# Patient Record
Sex: FEMALE | Race: WHITE | NOT HISPANIC OR LATINO | Employment: FULL TIME | URBAN - METROPOLITAN AREA
[De-identification: names, ages, dates, MRNs, and addresses within clinical notes are randomized per-mention and may not be internally consistent; named-entity substitution may affect disease eponyms.]

---

## 2017-03-06 ENCOUNTER — HOSPITAL ENCOUNTER (OUTPATIENT)
Dept: LAB | Facility: MEDICAL CENTER | Age: 57
End: 2017-03-06
Attending: NURSE PRACTITIONER
Payer: COMMERCIAL

## 2017-03-06 LAB
ALBUMIN SERPL BCP-MCNC: 4 G/DL (ref 3.2–4.9)
ALBUMIN/GLOB SERPL: 1.1 G/DL
ALP SERPL-CCNC: 60 U/L (ref 30–99)
ALT SERPL-CCNC: 17 U/L (ref 2–50)
ANION GAP SERPL CALC-SCNC: 9 MMOL/L (ref 0–11.9)
AST SERPL-CCNC: 16 U/L (ref 12–45)
BASOPHILS # BLD AUTO: 0.07 K/UL (ref 0–0.12)
BASOPHILS NFR BLD AUTO: 0.6 % (ref 0–1.8)
BILIRUB SERPL-MCNC: 0.4 MG/DL (ref 0.1–1.5)
BUN SERPL-MCNC: 13 MG/DL (ref 8–22)
CALCIUM SERPL-MCNC: 10.1 MG/DL (ref 8.5–10.5)
CHLORIDE SERPL-SCNC: 107 MMOL/L (ref 96–112)
CHOLEST SERPL-MCNC: 171 MG/DL (ref 100–199)
CO2 SERPL-SCNC: 24 MMOL/L (ref 20–33)
CREAT SERPL-MCNC: 0.66 MG/DL (ref 0.5–1.4)
CREAT UR-MCNC: 462 MG/DL
CRP SERPL HS-MCNC: 1.7 MG/L (ref 0–7.5)
EOSINOPHIL # BLD: 0.06 K/UL (ref 0–0.51)
EOSINOPHIL NFR BLD AUTO: 0.5 % (ref 0–6.9)
ERYTHROCYTE [DISTWIDTH] IN BLOOD BY AUTOMATED COUNT: 47.2 FL (ref 35.9–50)
GLOBULIN SER CALC-MCNC: 3.5 G/DL (ref 1.9–3.5)
GLUCOSE SERPL-MCNC: 127 MG/DL (ref 65–99)
HBV SURFACE AG SERPL QL IA: NEGATIVE
HCT VFR BLD AUTO: 45.8 % (ref 37–47)
HCV AB S/CO SERPL IA: NEGATIVE
HDLC SERPL-MCNC: 50 MG/DL
HGB BLD-MCNC: 15 G/DL (ref 12–16)
HIV 1+2 AB+HIV1 P24 AG SERPL QL IA: NON REACTIVE
IMM GRANULOCYTES # BLD AUTO: 0.07 K/UL (ref 0–0.11)
IMM GRANULOCYTES NFR BLD AUTO: 0.6 % (ref 0–0.9)
LDLC SERPL CALC-MCNC: 91 MG/DL
LYMPHOCYTES # BLD: 1.55 K/UL (ref 1–4.8)
LYMPHOCYTES NFR BLD AUTO: 13 % (ref 22–41)
MCH RBC QN AUTO: 31.7 PG (ref 27–33)
MCHC RBC AUTO-ENTMCNC: 32.8 G/DL (ref 33.6–35)
MCV RBC AUTO: 96.8 FL (ref 81.4–97.8)
MICROALBUMIN UR-MCNC: 4.7 MG/DL
MICROALBUMIN/CREAT UR: 10 MG/G (ref 0–30)
MONOCYTES # BLD: 0.83 K/UL (ref 0–0.85)
MONOCYTES NFR BLD AUTO: 6.9 % (ref 0–13.4)
NEUTROPHILS # BLD: 9.37 K/UL (ref 2–7.15)
NEUTROPHILS NFR BLD AUTO: 78.4 % (ref 44–72)
NRBC # BLD AUTO: 0 K/UL
NRBC BLD-RTO: 0 /100 WBC
PLATELET # BLD AUTO: 303 K/UL (ref 164–446)
PMV BLD AUTO: 9.7 FL (ref 9–12.9)
POTASSIUM SERPL-SCNC: 3.6 MMOL/L (ref 3.6–5.5)
PROT SERPL-MCNC: 7.5 G/DL (ref 6–8.2)
RBC # BLD AUTO: 4.73 M/UL (ref 4.2–5.4)
SODIUM SERPL-SCNC: 140 MMOL/L (ref 135–145)
T3FREE SERPL-MCNC: 3.19 PG/ML (ref 2.4–4.2)
T4 FREE SERPL-MCNC: 0.79 NG/DL (ref 0.53–1.43)
TREPONEMA PALLIDUM IGG+IGM AB [PRESENCE] IN SERUM OR PLASMA BY IMMUNOASSAY: NON REACTIVE
TRIGL SERPL-MCNC: 151 MG/DL (ref 0–149)
TSH SERPL DL<=0.005 MIU/L-ACNC: 0.7 UIU/ML (ref 0.3–3.7)
WBC # BLD AUTO: 12 K/UL (ref 4.8–10.8)

## 2017-03-06 PROCEDURE — 87340 HEPATITIS B SURFACE AG IA: CPT

## 2017-03-06 PROCEDURE — 84443 ASSAY THYROID STIM HORMONE: CPT

## 2017-03-06 PROCEDURE — 80053 COMPREHEN METABOLIC PANEL: CPT

## 2017-03-06 PROCEDURE — 86780 TREPONEMA PALLIDUM: CPT

## 2017-03-06 PROCEDURE — 84481 FREE ASSAY (FT-3): CPT

## 2017-03-06 PROCEDURE — 82043 UR ALBUMIN QUANTITATIVE: CPT

## 2017-03-06 PROCEDURE — 36415 COLL VENOUS BLD VENIPUNCTURE: CPT

## 2017-03-06 PROCEDURE — 80061 LIPID PANEL: CPT

## 2017-03-06 PROCEDURE — 87389 HIV-1 AG W/HIV-1&-2 AB AG IA: CPT

## 2017-03-06 PROCEDURE — 84439 ASSAY OF FREE THYROXINE: CPT

## 2017-03-06 PROCEDURE — 86800 THYROGLOBULIN ANTIBODY: CPT

## 2017-03-06 PROCEDURE — 86803 HEPATITIS C AB TEST: CPT

## 2017-03-06 PROCEDURE — 86141 C-REACTIVE PROTEIN HS: CPT

## 2017-03-06 PROCEDURE — 85025 COMPLETE CBC W/AUTO DIFF WBC: CPT

## 2017-03-06 PROCEDURE — 82570 ASSAY OF URINE CREATININE: CPT

## 2017-03-08 ENCOUNTER — HOSPITAL ENCOUNTER (OUTPATIENT)
Dept: RADIOLOGY | Facility: MEDICAL CENTER | Age: 57
End: 2017-03-08
Attending: NURSE PRACTITIONER
Payer: COMMERCIAL

## 2017-03-08 DIAGNOSIS — R52 PAIN: ICD-10-CM

## 2017-03-08 LAB — THYROGLOB AB SERPL-ACNC: <0.9 IU/ML (ref 0–4)

## 2017-03-08 PROCEDURE — 73080 X-RAY EXAM OF ELBOW: CPT | Mod: RT

## 2017-11-13 ENCOUNTER — TELEPHONE (OUTPATIENT)
Dept: MEDICAL GROUP | Facility: MEDICAL CENTER | Age: 57
End: 2017-11-13

## 2017-11-13 NOTE — TELEPHONE ENCOUNTER
NEW PATIENT VISIT PRE-VISIT PLANNING    1.  [employer] Patient is checked in Patient Demographics? YES    2.  Immunizations were updated in [facility] using WebIZ?: Yes       •  Web Iz Recommendations: FLU, MMR  and TDAP    3.  Is this appointment scheduled as a Hospital Follow-Up? No    4.  Patient is due for the following Health Maintenance Topics:   Health Maintenance Due   Topic Date Due   • IMM DTaP/Tdap/Td Vaccine (1 - Tdap) 06/27/1981   • PAP SMEAR  06/27/1983   • COLONOSCOPY  06/27/2012   • MAMMOGRAM  05/08/2016   • IMM INFLUENZA (1) 09/01/2017       I was unable to contact pt prior to appointment.

## 2017-11-14 ENCOUNTER — OFFICE VISIT (OUTPATIENT)
Dept: MEDICAL GROUP | Facility: MEDICAL CENTER | Age: 57
End: 2017-11-14
Payer: COMMERCIAL

## 2017-11-14 VITALS
RESPIRATION RATE: 20 BRPM | WEIGHT: 221.34 LBS | TEMPERATURE: 99.4 F | BODY MASS INDEX: 35.57 KG/M2 | DIASTOLIC BLOOD PRESSURE: 80 MMHG | OXYGEN SATURATION: 94 % | SYSTOLIC BLOOD PRESSURE: 118 MMHG | HEART RATE: 72 BPM | HEIGHT: 66 IN

## 2017-11-14 DIAGNOSIS — M54.50 CHRONIC MIDLINE LOW BACK PAIN WITHOUT SCIATICA: ICD-10-CM

## 2017-11-14 DIAGNOSIS — G89.29 CHRONIC RIGHT SHOULDER PAIN: ICD-10-CM

## 2017-11-14 DIAGNOSIS — I10 ESSENTIAL HYPERTENSION: ICD-10-CM

## 2017-11-14 DIAGNOSIS — G89.29 CHRONIC MIDLINE LOW BACK PAIN WITHOUT SCIATICA: ICD-10-CM

## 2017-11-14 DIAGNOSIS — M25.511 CHRONIC RIGHT SHOULDER PAIN: ICD-10-CM

## 2017-11-14 DIAGNOSIS — Z12.31 ENCOUNTER FOR SCREENING MAMMOGRAM FOR BREAST CANCER: ICD-10-CM

## 2017-11-14 DIAGNOSIS — R73.03 PREDIABETES: ICD-10-CM

## 2017-11-14 DIAGNOSIS — Z23 NEED FOR VACCINATION: ICD-10-CM

## 2017-11-14 DIAGNOSIS — E66.9 OBESITY (BMI 35.0-39.9 WITHOUT COMORBIDITY): ICD-10-CM

## 2017-11-14 PROCEDURE — 99204 OFFICE O/P NEW MOD 45 MIN: CPT | Mod: 25 | Performed by: INTERNAL MEDICINE

## 2017-11-14 PROCEDURE — 90686 IIV4 VACC NO PRSV 0.5 ML IM: CPT | Performed by: INTERNAL MEDICINE

## 2017-11-14 PROCEDURE — 90471 IMMUNIZATION ADMIN: CPT | Performed by: INTERNAL MEDICINE

## 2017-11-14 RX ORDER — AMLODIPINE BESYLATE 5 MG/1
5 TABLET ORAL DAILY
COMMUNITY
End: 2019-11-11

## 2017-11-14 RX ORDER — TRAMADOL HYDROCHLORIDE 50 MG/1
50 TABLET ORAL EVERY 4 HOURS PRN
COMMUNITY
End: 2019-09-19

## 2017-11-14 ASSESSMENT — PATIENT HEALTH QUESTIONNAIRE - PHQ9: CLINICAL INTERPRETATION OF PHQ2 SCORE: 0

## 2017-11-14 NOTE — PROGRESS NOTES
Subjective:   Isabella Ball is a 57 y.o. female here today to establish care and          Chief complaint: Establish with new provider and right shoulder pain. Patient was followed by a physician in the community. She has not been seen since February 2017. She is due for flu shot and mammogram.     1. Chronic right shoulder pain  Patient is complaining of right shoulder pain for the last 6-8 months. She was treated with physical therapy for right elbow pain however the pain seems to have traveled up to her right shoulder. 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      2. Chronic midline low back pain without sciatica    Patient has mild chronic low back pain. In general it bothers her the most when she gets up in the morning. She has tramadol which she takes infrequently for it. 3. Prediabetes    Results for ISABELLA BALL (MRN [id number]) as of 11/14/2017 10:00   Ref. Range 12/1/2015 08:40 3/6/2017 09:19   Glucose Latest Ref Range: 65 - 99 mg/dL 110 (H) 127 (H)       4. Essential hypertension    Patient's blood pressure has been well controlled on amlodipine 5 mg daily.    5. Need for vaccination    Patient is requesting flu vaccine today.    6. Encounter for screening mammogram for breast cancer    Last mammogram March 2015    7. Obesity (BMI 35.0-39.9 without comorbidity)    Patient has successfully lost about 40 pounds, however she has regained about 15 of that back. She is not currently doing any aerobic exercise.      Current medicines (including changes today)  Current Outpatient Prescriptions   Medication Sig Dispense Refill   • amlodipine (NORVASC) 5 MG Tab Take 5 mg by mouth every day.     • tramadol (ULTRAM) 50 MG Tab Take 50 mg by mouth every four hours as needed.       No current facility-administered medications for this visit.      She  has no past medical history on file.    Social History     Social  "History   • Marital status: Unknown     Spouse name: N/A   • Number of children: N/A   • Years of education: N/A     Social History Main Topics   • Smoking status: Current Every Day Smoker     Packs/day: 0.50     Years: 40.00   • Smokeless tobacco: Never Used   • Alcohol use 1.8 oz/week     3 Shots of liquor per week   • Drug use: Unknown   • Sexual activity: No     Other Topics Concern   • Caffeine Concern No   • Sleep Concern Yes   • Weight Concern Yes   • Special Diet No   • Exercise No     Social History Narrative   • No narrative on file     Family History   Problem Relation Age of Onset   • Cancer Maternal Grandmother      breast   • Cancer Maternal Aunt      breast   • Diabetes Mother    • Hypertension Father    • Heart Disease Paternal Grandmother        ROS       - Constitutional: Negative for fever, chills, unexpected weight change, and fatigue/generalized weakness.     - HEENT: Negative for headaches, vision changes      - Respiratory: Negative for cough, Shortness of breath    - Cardiovascular: Negative for chest pain and bilateral lower extremity edema.     - Gastrointestinal: Negative for heartburn,  abdominal pain,  diarrhea, constipation     - Genitourinary: Negative for dysuria  and urinary incontinence.    - Musculoskeletal: Positive for back and right shoulder pain    - Skin: Negative for rash     - Neurological: Negative for dizziness,  tremors, focal weakness     - Psychiatric/Behavioral: Negative for depression and memory loss.             Objective:     Blood pressure 118/80, pulse 72, temperature 37.4 °C (99.4 °F), resp. rate 20, height 1.676 m (5' 6\"), weight 100.4 kg (221 lb 5.5 oz), SpO2 94 %, not currently breastfeeding. Body mass index is 35.73 kg/m².     Physical Exam:  Constitutional: Alert, no distress.  Skin: Warm, dry, good turgor, no rashes in visible areas.  Eye: Equal, round and reactive, conjunctiva clear, lids normal.  ENMT: Lips without lesions, good dentition, oropharynx " clear. Hearing grossly intact.  Neck: No masses, no thyromegaly. No cervical or supraclavicular lymphadenopathy  Respiratory: Unlabored respiratory effort, lungs clear to auscultation, no wheezes, no rhonchi.  Cardiovascular: Regular rate and rhythm, without murmur, no edema.  Abdomen: Soft, non-tender, no masses, no hepatosplenomegaly.  Extremities: Mild tenderness around the rotator cuff of the right shoulder. Limited AB duction, unable to raise arm all the way over head.  Psych: Alert and oriented x3, normal affect and mood. Insight and judgment good            Assessment and Plan:   The following treatment plan was discussed    1. Chronic right shoulder pain      - REFERRAL TO PHYSICAL THERAPY Reason for Therapy: Eval/Treat/Report    2. Chronic midline low back pain without sciatica    Discussed getting a new mattress, hers is very old.    3. Prediabetes      - COMP METABOLIC PANEL; Future  - CBC WITH DIFFERENTIAL; Future  - HEMOGLOBIN A1C; Future    4. Essential hypertension    Well-controlled, continue amlodipine    5. Need for vaccination      - INFLUENZA VACCINE QUAD INJ >3Y(PF)    6. Encounter for screening mammogram for breast cancer      - MA-MAMMO SCREENING BILAT W/LINN W/CAD; Future    7. Obesity (BMI 35.0-39.9 without comorbidity)     TSH was normal March 2017  - Patient identified as having weight management issue.  Appropriate orders and counseling given.      Followup: Patient will follow up annually sooner when necessary

## 2017-11-14 NOTE — PROGRESS NOTES
Chief Complaint   Patient presents with   • New Patient   • Shoulder Pain     R,radiates down to arm   • Back Pain     Lower       HISTORY OF PRESENT ILLNESS: Patient is a 57 y.o. female patient who presents today to discuss the evaluation and management of:          1. Chronic right shoulder pain  ***    2. Chronic midline low back pain without sciatica  ***    3. Prediabetes  ***    4. Essential hypertension  ***    5. Need for vaccination  ***    6. Encounter for screening mammogram for breast cancer  ***    7. Obesity (BMI 35.0-39.9 without comorbidity)  ***        Patient Active Problem List    Diagnosis Date Noted   • Chronic right shoulder pain 11/14/2017   • Chronic midline low back pain without sciatica 11/14/2017   • Prediabetes 11/14/2017   • Essential hypertension 11/14/2017   • Obesity (BMI 35.0-39.9 without comorbidity) ([facility]) 11/14/2017        Allergies:Codeine    Current meds including changes today  Current Outpatient Prescriptions   Medication Sig Dispense Refill   • amlodipine (NORVASC) 5 MG Tab Take 5 mg by mouth every day.     • tramadol (ULTRAM) 50 MG Tab Take 50 mg by mouth every four hours as needed.       No current facility-administered medications for this visit.      Social History   Substance Use Topics   • Smoking status: Current Every Day Smoker     Packs/day: 0.50     Years: 40.00   • Smokeless tobacco: Never Used   • Alcohol use 1.8 oz/week     3 Shots of liquor per week     Social History     Social History Narrative   • No narrative on file       Family History   Problem Relation Age of Onset   • Cancer Maternal Grandmother      breast   • Cancer Maternal Aunt      breast   • Diabetes Mother    • Hypertension Father    • Heart Disease Paternal Grandmother        Review of Systems:  No chest pain, No shortness of breath, No dyspnea on exertion  Gastrointestinal ROS: No abdominal pain, No nausea, vomiting, diarrhea, or constipation  ***    Exam:  ***  Blood pressure 118/80, pulse 72,  "temperature 37.4 °C (99.4 °F), resp. rate 20, height 1.676 m (5' 6\"), weight 100.4 kg (221 lb 5.5 oz), SpO2 94 %, not currently breastfeeding.  General:  Well nourished, well developed female in NAD affect and mood within normal limits  Head is grossly normal.  Neck: Supple without adenopathy  Pulmonary: Clear to ausculation.  Normal effort. No rales, rhonchi, or wheezing.  Cardiovascular: Regular rate and rhythm without murmur.   Extremities: no clubbing, cyanosis, or edema.  Neuro: moves all extremities symmetrically    Please note that this dictation was created using voice recognition software. I have made every reasonable attempt to correct obvious errors, but I expect that there are errors of grammar and possibly content that I did not discover before finalizing the note.    Assessment/Plan:  1. Chronic right shoulder pain  ***  - REFERRAL TO PHYSICAL THERAPY Reason for Therapy: Eval/Treat/Report    2. Chronic midline low back pain without sciatica  ***    3. Prediabetes  ***  - COMP METABOLIC PANEL; Future  - CBC WITH DIFFERENTIAL; Future  - HEMOGLOBIN A1C; Future    4. Essential hypertension  ***    5. Need for vaccination  ***  - INFLUENZA VACCINE QUAD INJ >3Y(PF)    6. Encounter for screening mammogram for breast cancer  ***  - MA-MAMMO SCREENING BILAT W/LINN W/CAD; Future    7. Obesity (BMI 35.0-39.9 without comorbidity)  ***  - Patient identified as having weight management issue.  Appropriate orders and counseling given.    Followup: No Follow-up on file.        "

## 2019-09-19 ENCOUNTER — OFFICE VISIT (OUTPATIENT)
Dept: MEDICAL GROUP | Facility: MEDICAL CENTER | Age: 59
End: 2019-09-19
Payer: COMMERCIAL

## 2019-09-19 VITALS
TEMPERATURE: 98.2 F | SYSTOLIC BLOOD PRESSURE: 142 MMHG | HEART RATE: 65 BPM | BODY MASS INDEX: 40.72 KG/M2 | HEIGHT: 66 IN | DIASTOLIC BLOOD PRESSURE: 96 MMHG | RESPIRATION RATE: 14 BRPM | WEIGHT: 253.4 LBS | OXYGEN SATURATION: 91 %

## 2019-09-19 DIAGNOSIS — M79.671 BILATERAL FOOT PAIN: ICD-10-CM

## 2019-09-19 DIAGNOSIS — R73.03 PREDIABETES: ICD-10-CM

## 2019-09-19 DIAGNOSIS — Z12.39 BREAST CANCER SCREENING: ICD-10-CM

## 2019-09-19 DIAGNOSIS — Z13.6 SCREENING FOR CARDIOVASCULAR CONDITION: ICD-10-CM

## 2019-09-19 DIAGNOSIS — Z12.11 SCREENING FOR COLORECTAL CANCER: ICD-10-CM

## 2019-09-19 DIAGNOSIS — J06.9 VIRAL UPPER RESPIRATORY TRACT INFECTION: ICD-10-CM

## 2019-09-19 DIAGNOSIS — M79.672 BILATERAL FOOT PAIN: ICD-10-CM

## 2019-09-19 DIAGNOSIS — E66.01 MORBID OBESITY (HCC): ICD-10-CM

## 2019-09-19 DIAGNOSIS — Z72.0 TOBACCO ABUSE: ICD-10-CM

## 2019-09-19 DIAGNOSIS — Z12.12 SCREENING FOR COLORECTAL CANCER: ICD-10-CM

## 2019-09-19 DIAGNOSIS — I10 ESSENTIAL HYPERTENSION: ICD-10-CM

## 2019-09-19 PROBLEM — M25.511 CHRONIC RIGHT SHOULDER PAIN: Status: RESOLVED | Noted: 2017-11-14 | Resolved: 2019-09-19

## 2019-09-19 PROBLEM — M54.50 CHRONIC MIDLINE LOW BACK PAIN WITHOUT SCIATICA: Status: RESOLVED | Noted: 2017-11-14 | Resolved: 2019-09-19

## 2019-09-19 PROBLEM — G89.29 CHRONIC RIGHT SHOULDER PAIN: Status: RESOLVED | Noted: 2017-11-14 | Resolved: 2019-09-19

## 2019-09-19 PROBLEM — G89.29 CHRONIC MIDLINE LOW BACK PAIN WITHOUT SCIATICA: Status: RESOLVED | Noted: 2017-11-14 | Resolved: 2019-09-19

## 2019-09-19 LAB
HBA1C MFR BLD: 5.8 % (ref 0–5.6)
INT CON NEG: NEGATIVE
INT CON POS: POSITIVE

## 2019-09-19 PROCEDURE — 99214 OFFICE O/P EST MOD 30 MIN: CPT | Performed by: PHYSICIAN ASSISTANT

## 2019-09-19 PROCEDURE — 83036 HEMOGLOBIN GLYCOSYLATED A1C: CPT | Performed by: PHYSICIAN ASSISTANT

## 2019-09-19 RX ORDER — AZITHROMYCIN 250 MG/1
TABLET, FILM COATED ORAL
Qty: 6 TAB | Refills: 0 | Status: SHIPPED | OUTPATIENT
Start: 2019-09-19 | End: 2019-09-24

## 2019-09-19 RX ORDER — AZITHROMYCIN 250 MG/1
TABLET, FILM COATED ORAL
Qty: 6 TAB | Refills: 0 | Status: SHIPPED | OUTPATIENT
Start: 2019-09-19 | End: 2019-09-19 | Stop reason: CLARIF

## 2019-09-19 RX ORDER — AMLODIPINE BESYLATE 5 MG/1
5 TABLET ORAL DAILY
Qty: 90 TAB | Refills: 3 | Status: SHIPPED | OUTPATIENT
Start: 2019-09-19 | End: 2020-01-15 | Stop reason: SDUPTHER

## 2019-09-19 SDOH — HEALTH STABILITY: MENTAL HEALTH: HOW OFTEN DO YOU HAVE 6 OR MORE DRINKS ON ONE OCCASION?: WEEKLY

## 2019-09-19 SDOH — HEALTH STABILITY: MENTAL HEALTH: HOW MANY STANDARD DRINKS CONTAINING ALCOHOL DO YOU HAVE ON A TYPICAL DAY?: 1 OR 2

## 2019-09-19 SDOH — HEALTH STABILITY: MENTAL HEALTH: HOW OFTEN DO YOU HAVE A DRINK CONTAINING ALCOHOL?: 2-3 TIMES A WEEK

## 2019-09-19 NOTE — ASSESSMENT & PLAN NOTE
Few months. Gained weight - about 30 pounds over 6-7 months. No other change with work or shoes. Had been wearing heels and stopped that. Bottoms of feet and heels. Worse first thing in the morning and when she walks a lot. Willing to see podiatry.

## 2019-09-19 NOTE — ASSESSMENT & PLAN NOTE
3 weeks. No recurrent sinusitis or bronchitis. No hx of sinusitis or asthma. No otc meds currently. In the beginning taking radha seltzer. Currently with irritating cough. Some SOB and wheezing. No fever. Initially a lot of body aches. That is getting better. Sore throat is getting better. No hx of travel or known sick contacts. Does get some seasonal allergie with weeds and juan f, especially with eyes.

## 2019-09-19 NOTE — ASSESSMENT & PLAN NOTE
Amlodipine 5mg daily for about 4 years. Dr Beckford in [place] was prescribing. Last saw them few years ago. No known heart disease, no previous hx of stroke, blood clots.

## 2019-09-19 NOTE — PROGRESS NOTES
Subjective:   Gladis Ball is a 59 y.o. female here today to establish care. Saw Dr. Solis once in 2017. No other primary care since then. Due for preventative health care. Has a few new concerns.     Bilateral foot pain  Few months. Gained weight - about 30 pounds over 6-7 months. No other change with work or shoes. Had been wearing heels and stopped that. Bottoms of feet and heels. Worse first thing in the morning and when she walks a lot. Willing to see podiatry.     Viral upper respiratory tract infection  3 weeks. No recurrent sinusitis or bronchitis. No hx of sinusitis or asthma. No otc meds currently. In the beginning taking radha seltzer. Currently with irritating cough. Some SOB and wheezing. No fever. Initially a lot of body aches. That is getting better. Sore throat is getting better. No hx of travel or known sick contacts. Does get some seasonal allergie with weeds and juan f, especially with eyes.     Tobacco abuse  About 40 years, 1/2 pack per day, so far no known lung disease. Not interested in quitting. States that she understands the risk associated.    Essential hypertension  Amlodipine 5mg daily for about 4 years. Dr Beckford in [place] was prescribing. Last saw them few years ago. No known heart disease, no previous hx of stroke, blood clots.    Morbid obesity (HCC)  Knows she weighs to much, too much starch - chips, mashed potatoes, candy, etc. Knows stress at work and working too many hours isn't helping. Declines nutritionist or other referral at this time.    Prediabetes  POCT 5.8 today in office       Current medicines (including changes today)  Current Outpatient Medications   Medication Sig Dispense Refill   • amLODIPine (NORVASC) 5 MG Tab Take 1 Tab by mouth every day. 90 Tab 3   • azithromycin (ZITHROMAX) 250 MG Tab Use ABRAN as directed 6 Tab 0   • amlodipine (NORVASC) 5 MG Tab Take 5 mg by mouth every day.       No current facility-administered medications for this visit.      She  has no  "past medical history on file.    ROS   No fever/chills. No headache/dizziness. No focal weakness. No sore throat, nasal congestion, ear pain. No chest pain. No n/v/d/c or abdominal pain. No urinary complaint. No rash or skin lesion. No joint redness or swelling.       Objective:     /96 (BP Location: Right arm, Patient Position: Sitting, BP Cuff Size: Adult long)   Pulse 65   Temp 36.8 °C (98.2 °F) (Temporal)   Resp 14   Ht 1.676 m (5' 6\")   Wt 114.9 kg (253 lb 6.4 oz)   SpO2 91%  Body mass index is 40.9 kg/m².   Physical Exam:  Constitutional: WDWN, NAD  Skin: Warm, dry, good turgor, no rashes in visible areas.  Eye: Equal, round and reactive, conjunctiva clear, lids normal.  ENMT: Lips without lesions, good dentition, oropharynx clear.  Neck: Trachea midline, no masses, no thyromegaly. No cervical or supraclavicular lymphadenopathy  Respiratory: Unlabored respiratory effort, lungs clear to auscultation, no wheezes, no ronchi.  Cardiovascular: Normal S1, S2, no murmur, no leg edema.  Psych: Alert and oriented x3, normal affect and mood.    Assessment and Plan:   The following treatment plan was discussed    1. Essential hypertension  Amlodipine 5mg sent to pharmacy. Check in office in 2-3 weeks.  - Comp Metabolic Panel; Future  - TSH WITH REFLEX TO FT4; Future    2. Bilateral foot pain    - REFERRAL TO PODIATRY    3. Morbid obesity (HCC)      4. Screening for colorectal cancer    - REFERRAL TO GI FOR COLONOSCOPY    5. Breast cancer screening    - MA-SCREEN MAMMO W/CAD-BILAT; Future    6. Tobacco abuse      7. Viral upper respiratory tract infection    - azithromycin (ZITHROMAX) 250 MG Tab; Use ABRAN as directed  Dispense: 6 Tab; Refill: 0    8. Screening for cardiovascular condition    - Lipid Profile; Future    9. Prediabetes    - POCT A1C      Followup: Return in about 2 weeks (around 10/3/2019).         "

## 2019-09-19 NOTE — ASSESSMENT & PLAN NOTE
About 40 years, 1/2 pack per day, so far no known lung disease. Not interested in quitting. States that she understands the risk associated.

## 2019-09-19 NOTE — ASSESSMENT & PLAN NOTE
Knows she weighs to much, too much starch - chips, mashed potatoes, candy, etc. Knows stress at work and working too many hours isn't helping. Declines nutritionist or other referral at this time.

## 2019-11-11 ENCOUNTER — OFFICE VISIT (OUTPATIENT)
Dept: MEDICAL GROUP | Facility: MEDICAL CENTER | Age: 59
End: 2019-11-11
Payer: COMMERCIAL

## 2019-11-11 VITALS
HEIGHT: 66 IN | OXYGEN SATURATION: 94 % | SYSTOLIC BLOOD PRESSURE: 132 MMHG | DIASTOLIC BLOOD PRESSURE: 90 MMHG | HEART RATE: 76 BPM | RESPIRATION RATE: 16 BRPM | TEMPERATURE: 98.4 F | BODY MASS INDEX: 40.92 KG/M2 | WEIGHT: 254.6 LBS

## 2019-11-11 DIAGNOSIS — I10 ESSENTIAL HYPERTENSION: ICD-10-CM

## 2019-11-11 DIAGNOSIS — Z23 NEED FOR VACCINATION: ICD-10-CM

## 2019-11-11 PROBLEM — J06.9 VIRAL UPPER RESPIRATORY TRACT INFECTION: Status: RESOLVED | Noted: 2019-09-19 | Resolved: 2019-11-11

## 2019-11-11 PROCEDURE — 90686 IIV4 VACC NO PRSV 0.5 ML IM: CPT | Performed by: PHYSICIAN ASSISTANT

## 2019-11-11 PROCEDURE — 90471 IMMUNIZATION ADMIN: CPT | Performed by: PHYSICIAN ASSISTANT

## 2019-11-11 PROCEDURE — 99213 OFFICE O/P EST LOW 20 MIN: CPT | Mod: 25 | Performed by: PHYSICIAN ASSISTANT

## 2019-11-11 RX ORDER — AMLODIPINE BESYLATE 2.5 MG/1
2.5 TABLET ORAL DAILY
Qty: 90 TAB | Refills: 3 | Status: SHIPPED | OUTPATIENT
Start: 2019-11-11 | End: 2020-02-25

## 2019-11-11 ASSESSMENT — PATIENT HEALTH QUESTIONNAIRE - PHQ9: CLINICAL INTERPRETATION OF PHQ2 SCORE: 0

## 2019-11-11 NOTE — PROGRESS NOTES
"Subjective:   Gladis Ball is a 59 y.o. female here today for HTN follow up    Essential hypertension  Taking amlodipine 5mg daily without adverse reaction. Doesn't check BPs at home. No headaches or dizziness. Will try to decrease salt in diet. Agrees to go up to 7.5mg amlodipine daily to optimize BP       Current medicines (including changes today)  Current Outpatient Medications   Medication Sig Dispense Refill   • amLODIPine (NORVASC) 2.5 MG Tab Take 1 Tab by mouth every day. 90 Tab 3   • amLODIPine (NORVASC) 5 MG Tab Take 1 Tab by mouth every day. 90 Tab 3     No current facility-administered medications for this visit.      She  has no past medical history on file.    ROS   No fever/chills. No weight change. No headache/dizziness. No focal weakness. No sore throat, nasal congestion, ear pain. No chest pain, no shortness of breath, difficulty breathing. No n/v/d/c or abdominal pain. No urinary complaint. No rash or skin lesion.        Objective:     /90 (BP Location: Right arm, Patient Position: Sitting, BP Cuff Size: Adult long)   Pulse 76   Temp 36.9 °C (98.4 °F) (Temporal)   Resp 16   Ht 1.676 m (5' 6\")   Wt 115.5 kg (254 lb 9.6 oz)   SpO2 94%  Body mass index is 41.09 kg/m².   Physical Exam:  Constitutional: WDWN, NAD  Skin: Warm, dry, good turgor, no rashes in visible areas.  Respiratory: Unlabored respiratory effort, lungs clear to auscultation, no wheezes, no ronchi.  Cardiovascular: Normal S1, S2, no murmur, no leg edema.  Psych: Alert and oriented x3, normal affect and mood.    Assessment and Plan:   The following treatment plan was discussed    1. Essential hypertension  Cont 5mg and add 2.5mg for total of 7.5mg daily  - amLODIPine (NORVASC) 2.5 MG Tab; Take 1 Tab by mouth every day.  Dispense: 90 Tab; Refill: 3    2. Need for vaccination    - Influenza Vaccine Quad Injection (PF)      Followup:3 months         "

## 2019-11-11 NOTE — ASSESSMENT & PLAN NOTE
Taking amlodipine 5mg daily without adverse reaction. Doesn't check BPs at home. No headaches or dizziness. Will try to decrease salt in diet. Agrees to go up to 7.5mg amlodipine daily to optimize BP

## 2019-11-12 ENCOUNTER — HOSPITAL ENCOUNTER (OUTPATIENT)
Dept: RADIOLOGY | Facility: MEDICAL CENTER | Age: 59
End: 2019-11-12
Attending: PHYSICIAN ASSISTANT
Payer: COMMERCIAL

## 2019-11-12 DIAGNOSIS — Z12.39 BREAST CANCER SCREENING: ICD-10-CM

## 2019-11-12 PROCEDURE — 77063 BREAST TOMOSYNTHESIS BI: CPT

## 2019-11-15 ENCOUNTER — HOSPITAL ENCOUNTER (OUTPATIENT)
Dept: RADIOLOGY | Facility: MEDICAL CENTER | Age: 59
End: 2019-11-15
Attending: PHYSICIAN ASSISTANT
Payer: COMMERCIAL

## 2019-11-15 DIAGNOSIS — R92.8 ABNORMAL MAMMOGRAM: ICD-10-CM

## 2019-11-15 PROCEDURE — 77065 DX MAMMO INCL CAD UNI: CPT | Mod: LT

## 2019-11-19 ENCOUNTER — HOSPITAL ENCOUNTER (OUTPATIENT)
Dept: RADIOLOGY | Facility: MEDICAL CENTER | Age: 59
End: 2019-11-19
Attending: PHYSICIAN ASSISTANT
Payer: COMMERCIAL

## 2019-11-19 DIAGNOSIS — R92.8 ABNORMAL FINDING ON BREAST IMAGING: ICD-10-CM

## 2019-11-19 LAB — PATHOLOGY CONSULT NOTE: NORMAL

## 2019-11-19 PROCEDURE — 88305 TISSUE EXAM BY PATHOLOGIST: CPT

## 2019-11-19 PROCEDURE — 19081 BX BREAST 1ST LESION STRTCTC: CPT

## 2019-11-20 ENCOUNTER — TELEPHONE (OUTPATIENT)
Dept: RADIOLOGY | Facility: MEDICAL CENTER | Age: 59
End: 2019-11-20

## 2020-01-15 RX ORDER — AMLODIPINE BESYLATE 5 MG/1
5 TABLET ORAL DAILY
Qty: 90 TAB | Refills: 3 | Status: SHIPPED | OUTPATIENT
Start: 2020-01-15 | End: 2020-02-25

## 2020-01-30 ENCOUNTER — HOSPITAL ENCOUNTER (OUTPATIENT)
Dept: LAB | Facility: MEDICAL CENTER | Age: 60
End: 2020-01-30
Attending: PHYSICIAN ASSISTANT
Payer: COMMERCIAL

## 2020-01-30 DIAGNOSIS — Z13.6 SCREENING FOR CARDIOVASCULAR CONDITION: ICD-10-CM

## 2020-01-30 DIAGNOSIS — I10 ESSENTIAL HYPERTENSION: ICD-10-CM

## 2020-01-30 LAB
ALBUMIN SERPL BCP-MCNC: 4.1 G/DL (ref 3.2–4.9)
ALBUMIN/GLOB SERPL: 1.2 G/DL
ALP SERPL-CCNC: 64 U/L (ref 30–99)
ALT SERPL-CCNC: 27 U/L (ref 2–50)
ANION GAP SERPL CALC-SCNC: 10 MMOL/L (ref 0–11.9)
AST SERPL-CCNC: 23 U/L (ref 12–45)
BILIRUB SERPL-MCNC: 0.4 MG/DL (ref 0.1–1.5)
BUN SERPL-MCNC: 16 MG/DL (ref 8–22)
CALCIUM SERPL-MCNC: 9.7 MG/DL (ref 8.5–10.5)
CHLORIDE SERPL-SCNC: 107 MMOL/L (ref 96–112)
CHOLEST SERPL-MCNC: 176 MG/DL (ref 100–199)
CO2 SERPL-SCNC: 22 MMOL/L (ref 20–33)
CREAT SERPL-MCNC: 0.78 MG/DL (ref 0.5–1.4)
GLOBULIN SER CALC-MCNC: 3.4 G/DL (ref 1.9–3.5)
GLUCOSE SERPL-MCNC: 136 MG/DL (ref 65–99)
HDLC SERPL-MCNC: 39 MG/DL
LDLC SERPL CALC-MCNC: 112 MG/DL
POTASSIUM SERPL-SCNC: 4 MMOL/L (ref 3.6–5.5)
PROT SERPL-MCNC: 7.5 G/DL (ref 6–8.2)
SODIUM SERPL-SCNC: 139 MMOL/L (ref 135–145)
TRIGL SERPL-MCNC: 126 MG/DL (ref 0–149)
TSH SERPL DL<=0.005 MIU/L-ACNC: 0.9 UIU/ML (ref 0.38–5.33)

## 2020-01-30 PROCEDURE — 84443 ASSAY THYROID STIM HORMONE: CPT

## 2020-01-30 PROCEDURE — 80061 LIPID PANEL: CPT

## 2020-01-30 PROCEDURE — 36415 COLL VENOUS BLD VENIPUNCTURE: CPT

## 2020-01-30 PROCEDURE — 80053 COMPREHEN METABOLIC PANEL: CPT

## 2020-02-25 ENCOUNTER — OFFICE VISIT (OUTPATIENT)
Dept: MEDICAL GROUP | Facility: MEDICAL CENTER | Age: 60
End: 2020-02-25
Payer: COMMERCIAL

## 2020-02-25 VITALS
HEIGHT: 67 IN | TEMPERATURE: 98.2 F | SYSTOLIC BLOOD PRESSURE: 132 MMHG | OXYGEN SATURATION: 94 % | HEART RATE: 81 BPM | DIASTOLIC BLOOD PRESSURE: 92 MMHG | WEIGHT: 258.82 LBS | BODY MASS INDEX: 40.62 KG/M2

## 2020-02-25 DIAGNOSIS — M79.671 BILATERAL FOOT PAIN: ICD-10-CM

## 2020-02-25 DIAGNOSIS — E66.01 MORBID OBESITY (HCC): ICD-10-CM

## 2020-02-25 DIAGNOSIS — R53.83 FATIGUE, UNSPECIFIED TYPE: ICD-10-CM

## 2020-02-25 DIAGNOSIS — R73.03 PREDIABETES: ICD-10-CM

## 2020-02-25 DIAGNOSIS — I10 ESSENTIAL HYPERTENSION: ICD-10-CM

## 2020-02-25 DIAGNOSIS — M79.672 BILATERAL FOOT PAIN: ICD-10-CM

## 2020-02-25 DIAGNOSIS — Z23 NEED FOR VACCINATION: ICD-10-CM

## 2020-02-25 DIAGNOSIS — R40.0 DAYTIME SLEEPINESS: ICD-10-CM

## 2020-02-25 LAB
HBA1C MFR BLD: 6.3 % (ref 0–5.6)
INT CON NEG: ABNORMAL
INT CON POS: ABNORMAL

## 2020-02-25 PROCEDURE — 99214 OFFICE O/P EST MOD 30 MIN: CPT | Mod: 25 | Performed by: PHYSICIAN ASSISTANT

## 2020-02-25 PROCEDURE — 90472 IMMUNIZATION ADMIN EACH ADD: CPT | Performed by: PHYSICIAN ASSISTANT

## 2020-02-25 PROCEDURE — 90715 TDAP VACCINE 7 YRS/> IM: CPT | Performed by: PHYSICIAN ASSISTANT

## 2020-02-25 PROCEDURE — 90471 IMMUNIZATION ADMIN: CPT | Performed by: PHYSICIAN ASSISTANT

## 2020-02-25 PROCEDURE — 90732 PPSV23 VACC 2 YRS+ SUBQ/IM: CPT | Performed by: PHYSICIAN ASSISTANT

## 2020-02-25 PROCEDURE — 83036 HEMOGLOBIN GLYCOSYLATED A1C: CPT | Performed by: PHYSICIAN ASSISTANT

## 2020-02-25 RX ORDER — AMLODIPINE BESYLATE 10 MG/1
10 TABLET ORAL DAILY
Qty: 90 TAB | Refills: 3 | Status: SHIPPED | OUTPATIENT
Start: 2020-02-25 | End: 2020-12-28

## 2020-02-25 ASSESSMENT — PATIENT HEALTH QUESTIONNAIRE - PHQ9: CLINICAL INTERPRETATION OF PHQ2 SCORE: 0

## 2020-02-25 NOTE — PROGRESS NOTES
"Subjective:   Gladis Ball is a 59 y.o. female here today for follow up on BP, lab review    Essential hypertension  Taking the amlodipine 7.5mg as directed. Willing to increase to 10mg. No known adverse reaction    Bilateral foot pain  Saw podiatrist, was told she would need surgical correction of both feet, had to postpone d/t surgery for her dad, will try again in the late spring or summer    Prediabetes  HgA1C 6.0 today up from 5.8 in Sept 2019. Discussed diet changes. Repeat 3 months    Daytime sleepiness  Wonders if she has sleep apnea, hasn't been tested before       Current medicines (including changes today)  Current Outpatient Medications   Medication Sig Dispense Refill   • amLODIPine (NORVASC) 10 MG Tab Take 1 Tab by mouth every day. 90 Tab 3     No current facility-administered medications for this visit.      She  has no past medical history on file.    ROS   No fever/chills. No weight change. No headache/dizziness. No focal weakness. No sore throat, nasal congestion, ear pain. No chest pain, no shortness of breath, difficulty breathing. No n/v/d/c or abdominal pain. No urinary complaint. No rash or skin lesion.      Objective:     /92 (BP Location: Left arm, Patient Position: Sitting, BP Cuff Size: Adult long)   Pulse 81   Temp 36.8 °C (98.2 °F)   Ht 1.702 m (5' 7\")   Wt 117.4 kg (258 lb 13.1 oz)   SpO2 94%  Body mass index is 40.54 kg/m².   Physical Exam:  Constitutional: WDWN, NAD  Skin: Warm, dry, good turgor, no rashes in visible areas.  Respiratory: Unlabored respiratory effort, lungs clear to auscultation, no wheezes, no ronchi.  Cardiovascular: Normal S1, S2, no murmur, no leg edema.  Psych: Alert and oriented x3, normal affect and mood.    Assessment and Plan:   The following treatment plan was discussed    1. Prediabetes    - POCT A1C    2. Need for vaccination    - Pneumovax Vaccine (PPSV23)  - Tdap Vaccine =>8YO IM    3. Essential hypertension      4. Bilateral foot " pain      5. Daytime sleepiness    - REFERRAL TO SLEEP STUDIES    6. Fatigue, unspecified type    - REFERRAL TO SLEEP STUDIES  - CBC WITH DIFFERENTIAL; Future    7. Morbid obesity (HCC)    - REFERRAL TO SLEEP STUDIES      Followup: Return in about 3 months (around 5/25/2020).

## 2020-02-25 NOTE — ASSESSMENT & PLAN NOTE
Saw podiatrist, was told she would need surgical correction of both feet, had to postpone d/t surgery for her dad, will try again in the late spring or summer

## 2020-03-06 ENCOUNTER — SUPERVISING PHYSICIAN REVIEW (OUTPATIENT)
Dept: MEDICAL GROUP | Facility: MEDICAL CENTER | Age: 60
End: 2020-03-06

## 2020-09-28 ENCOUNTER — OFFICE VISIT (OUTPATIENT)
Dept: MEDICAL GROUP | Facility: MEDICAL CENTER | Age: 60
End: 2020-09-28
Payer: COMMERCIAL

## 2020-09-28 VITALS
TEMPERATURE: 96.8 F | BODY MASS INDEX: 40.53 KG/M2 | WEIGHT: 258.2 LBS | HEIGHT: 67 IN | SYSTOLIC BLOOD PRESSURE: 124 MMHG | OXYGEN SATURATION: 94 % | HEART RATE: 95 BPM | DIASTOLIC BLOOD PRESSURE: 82 MMHG

## 2020-09-28 DIAGNOSIS — M25.472 BILATERAL SWELLING OF FEET AND ANKLES: ICD-10-CM

## 2020-09-28 DIAGNOSIS — E66.01 MORBID OBESITY (HCC): ICD-10-CM

## 2020-09-28 DIAGNOSIS — M25.475 BILATERAL SWELLING OF FEET AND ANKLES: ICD-10-CM

## 2020-09-28 DIAGNOSIS — M25.474 BILATERAL SWELLING OF FEET AND ANKLES: ICD-10-CM

## 2020-09-28 DIAGNOSIS — M25.471 BILATERAL SWELLING OF FEET AND ANKLES: ICD-10-CM

## 2020-09-28 DIAGNOSIS — R73.03 PREDIABETES: ICD-10-CM

## 2020-09-28 DIAGNOSIS — I10 ESSENTIAL HYPERTENSION: ICD-10-CM

## 2020-09-28 DIAGNOSIS — R23.3 PETECHIAE: ICD-10-CM

## 2020-09-28 LAB
HBA1C MFR BLD: 6.2 % (ref 0–5.6)
INT CON NEG: ABNORMAL
INT CON POS: ABNORMAL

## 2020-09-28 PROCEDURE — 83036 HEMOGLOBIN GLYCOSYLATED A1C: CPT | Performed by: PHYSICIAN ASSISTANT

## 2020-09-28 PROCEDURE — 99214 OFFICE O/P EST MOD 30 MIN: CPT | Performed by: PHYSICIAN ASSISTANT

## 2020-09-28 RX ORDER — HYDROCHLOROTHIAZIDE 25 MG/1
25 TABLET ORAL DAILY
Qty: 90 TAB | Refills: 3 | Status: SHIPPED | OUTPATIENT
Start: 2020-09-28 | End: 2022-03-15

## 2020-09-28 NOTE — ASSESSMENT & PLAN NOTE
About 1-2 months. New problem. Swelling of feet and ankles. No known cause. Worse after standing. Worse after working all day. Better in the am when she wakes up. Better when she takes one of her mom's furosemide pills. No redness of legs. No color change of feet. No SOB or difficulty breathing. No change to amlodipine. No known h/o heart failure. No known h/o vascular disease.

## 2020-09-28 NOTE — ASSESSMENT & PLAN NOTE
Small red/brown spots/freckles on bilat upper feet, few months, unknown cause. No pain or itching. No bruising or easy bleeding. No history of similar

## 2020-09-28 NOTE — PROGRESS NOTES
"Subjective:   Gladis Ball is a 60 y.o. female here today for swelling of feet and ankles and f/u on chronic conditions    Morbid obesity (HCC)  Weight stable, no change in diet or exercise    Essential hypertension  Chronic, stable, no headache or dizziness, having some lower leg/ankle/feet swelling    Prediabetes  6.2 today. Discussed diet and exercise    Petechiae  Small red/brown spots/freckles on bilat upper feet, few months, unknown cause. No pain or itching. No bruising or easy bleeding. No history of similar    Bilateral swelling of feet and ankles  About 1-2 months. New problem. Swelling of feet and ankles. No known cause. Worse after standing. Worse after working all day. Better in the am when she wakes up. Better when she takes one of her mom's furosemide pills. No redness of legs. No color change of feet. No SOB or difficulty breathing. No change to amlodipine. No known h/o heart failure. No known h/o vascular disease.        Current medicines (including changes today)  Current Outpatient Medications   Medication Sig Dispense Refill   • hydroCHLOROthiazide (HYDRODIURIL) 25 MG Tab Take 1 Tab by mouth every day. 90 Tab 3   • amLODIPine (NORVASC) 10 MG Tab Take 1 Tab by mouth every day. 90 Tab 3     No current facility-administered medications for this visit.      She  has no past medical history on file.    ROS   No fever/chills. No weight change. No headache/dizziness. No focal weakness. No sore throat, nasal congestion, ear pain. No chest pain, no shortness of breath, difficulty breathing. No n/v/d/c or abdominal pain. No urinary complaint.No joint pain or swelling.       Objective:     /82 (BP Location: Right arm, Patient Position: Sitting, BP Cuff Size: Adult long)   Pulse 95   Temp 36 °C (96.8 °F) (Temporal)   Ht 1.702 m (5' 7\")   Wt 117.1 kg (258 lb 3.2 oz)   SpO2 94%  Body mass index is 40.44 kg/m².   Physical Exam:  Constitutional: WDWN, NAD  Skin: Warm, dry, good turgor, no rashes in " visible areas.  Eye: Equal, round and reactive, conjunctiva clear, lids normal.  ENMT: Lips without lesions, good dentition, oropharynx clear.  Neck: Trachea midline, no masses, no thyromegaly. No cervical or supraclavicular lymphadenopathy  Respiratory: Unlabored respiratory effort, lungs clear to auscultation, no wheezes, no ronchi.  Cardiovascular: Normal S1, S2, no murmur, no leg edema.  bilat feet and ankles examined with very mild swelling, no pitting edema. Small macules red/brown dorsal feet  Psych: Alert and oriented x3, normal affect and mood.      Assessment and Plan:   The following treatment plan was discussed    1. Bilateral swelling of feet and ankles  Elevate, compression stockings, consider changing amlodipine?  - proBrain Natriuretic Peptide, NT; Future  - CBC WITH DIFFERENTIAL; Future  - TSH WITH REFLEX TO FT4; Future  - hydroCHLOROthiazide (HYDRODIURIL) 25 MG Tab; Take 1 Tab by mouth every day.  Dispense: 90 Tab; Refill: 3  - Basic Metabolic Panel; Future    2. Petechiae    - CBC WITH DIFFERENTIAL; Future    3. Prediabetes    - POCT A1C    4. Morbid obesity (HCC)      5. Essential hypertension  Cont current for now      Followup:after labs

## 2020-12-28 RX ORDER — AMLODIPINE BESYLATE 10 MG/1
TABLET ORAL
Qty: 90 TAB | Refills: 3 | Status: SHIPPED | OUTPATIENT
Start: 2020-12-28 | End: 2021-11-29

## 2021-03-15 DIAGNOSIS — Z23 NEED FOR VACCINATION: ICD-10-CM

## 2022-03-15 ENCOUNTER — HOSPITAL ENCOUNTER (OUTPATIENT)
Dept: LAB | Facility: MEDICAL CENTER | Age: 62
End: 2022-03-15
Attending: PHYSICIAN ASSISTANT
Payer: COMMERCIAL

## 2022-03-15 ENCOUNTER — OFFICE VISIT (OUTPATIENT)
Dept: MEDICAL GROUP | Facility: MEDICAL CENTER | Age: 62
End: 2022-03-15
Payer: COMMERCIAL

## 2022-03-15 VITALS
TEMPERATURE: 97.2 F | SYSTOLIC BLOOD PRESSURE: 122 MMHG | DIASTOLIC BLOOD PRESSURE: 94 MMHG | BODY MASS INDEX: 39.21 KG/M2 | HEIGHT: 67 IN | OXYGEN SATURATION: 96 % | WEIGHT: 249.8 LBS | RESPIRATION RATE: 20 BRPM | HEART RATE: 87 BPM

## 2022-03-15 DIAGNOSIS — I10 ESSENTIAL HYPERTENSION: ICD-10-CM

## 2022-03-15 DIAGNOSIS — M79.671 BILATERAL FOOT PAIN: ICD-10-CM

## 2022-03-15 DIAGNOSIS — R73.03 PREDIABETES: ICD-10-CM

## 2022-03-15 DIAGNOSIS — Z13.6 SCREENING FOR CARDIOVASCULAR CONDITION: ICD-10-CM

## 2022-03-15 DIAGNOSIS — Z23 NEED FOR IMMUNIZATION AGAINST INFLUENZA: ICD-10-CM

## 2022-03-15 DIAGNOSIS — M79.672 BILATERAL FOOT PAIN: ICD-10-CM

## 2022-03-15 DIAGNOSIS — Z00.00 WELLNESS EXAMINATION: ICD-10-CM

## 2022-03-15 PROBLEM — M25.475 BILATERAL SWELLING OF FEET AND ANKLES: Status: RESOLVED | Noted: 2020-09-28 | Resolved: 2022-03-15

## 2022-03-15 PROBLEM — M25.471 BILATERAL SWELLING OF FEET AND ANKLES: Status: RESOLVED | Noted: 2020-09-28 | Resolved: 2022-03-15

## 2022-03-15 PROBLEM — E66.01 MORBID OBESITY (HCC): Status: RESOLVED | Noted: 2017-11-14 | Resolved: 2022-03-15

## 2022-03-15 PROBLEM — R23.3 PETECHIAE: Status: RESOLVED | Noted: 2020-09-28 | Resolved: 2022-03-15

## 2022-03-15 PROBLEM — M25.474 BILATERAL SWELLING OF FEET AND ANKLES: Status: RESOLVED | Noted: 2020-09-28 | Resolved: 2022-03-15

## 2022-03-15 PROBLEM — M25.472 BILATERAL SWELLING OF FEET AND ANKLES: Status: RESOLVED | Noted: 2020-09-28 | Resolved: 2022-03-15

## 2022-03-15 LAB
ALBUMIN SERPL BCP-MCNC: 4.4 G/DL (ref 3.2–4.9)
ALBUMIN/GLOB SERPL: 1.5 G/DL
ALP SERPL-CCNC: 80 U/L (ref 30–99)
ALT SERPL-CCNC: 22 U/L (ref 2–50)
ANION GAP SERPL CALC-SCNC: 13 MMOL/L (ref 7–16)
AST SERPL-CCNC: 23 U/L (ref 12–45)
BILIRUB SERPL-MCNC: 0.4 MG/DL (ref 0.1–1.5)
BUN SERPL-MCNC: 13 MG/DL (ref 8–22)
CALCIUM SERPL-MCNC: 10.1 MG/DL (ref 8.5–10.5)
CHLORIDE SERPL-SCNC: 106 MMOL/L (ref 96–112)
CHOLEST SERPL-MCNC: 188 MG/DL (ref 100–199)
CO2 SERPL-SCNC: 24 MMOL/L (ref 20–33)
CREAT SERPL-MCNC: 0.67 MG/DL (ref 0.5–1.4)
EST. AVERAGE GLUCOSE BLD GHB EST-MCNC: 134 MG/DL
FASTING STATUS PATIENT QL REPORTED: NORMAL
GFR SERPLBLD CREATININE-BSD FMLA CKD-EPI: 100 ML/MIN/1.73 M 2
GLOBULIN SER CALC-MCNC: 2.9 G/DL (ref 1.9–3.5)
GLUCOSE SERPL-MCNC: 94 MG/DL (ref 65–99)
HBA1C MFR BLD: 6.3 % (ref 4–5.6)
HDLC SERPL-MCNC: 38 MG/DL
LDLC SERPL CALC-MCNC: 123 MG/DL
POTASSIUM SERPL-SCNC: 5.4 MMOL/L (ref 3.6–5.5)
PROT SERPL-MCNC: 7.3 G/DL (ref 6–8.2)
SODIUM SERPL-SCNC: 143 MMOL/L (ref 135–145)
TRIGL SERPL-MCNC: 136 MG/DL (ref 0–149)
TSH SERPL DL<=0.005 MIU/L-ACNC: 0.69 UIU/ML (ref 0.38–5.33)

## 2022-03-15 PROCEDURE — 90471 IMMUNIZATION ADMIN: CPT | Performed by: PHYSICIAN ASSISTANT

## 2022-03-15 PROCEDURE — 99214 OFFICE O/P EST MOD 30 MIN: CPT | Mod: 25 | Performed by: PHYSICIAN ASSISTANT

## 2022-03-15 PROCEDURE — 90686 IIV4 VACC NO PRSV 0.5 ML IM: CPT | Performed by: PHYSICIAN ASSISTANT

## 2022-03-15 PROCEDURE — 83036 HEMOGLOBIN GLYCOSYLATED A1C: CPT

## 2022-03-15 PROCEDURE — 84443 ASSAY THYROID STIM HORMONE: CPT

## 2022-03-15 PROCEDURE — 80053 COMPREHEN METABOLIC PANEL: CPT

## 2022-03-15 PROCEDURE — 80061 LIPID PANEL: CPT

## 2022-03-15 PROCEDURE — 36415 COLL VENOUS BLD VENIPUNCTURE: CPT

## 2022-03-15 ASSESSMENT — PATIENT HEALTH QUESTIONNAIRE - PHQ9: CLINICAL INTERPRETATION OF PHQ2 SCORE: 0

## 2022-03-15 NOTE — PROGRESS NOTES
"Subjective:   Gladis Ball is a 61 y.o. female here today for follow up on chronic conditions. No recent illness or injury. Declines mammogram and colon cancer screening.    Chief Complaint   Patient presents with   • Hypertension Follow-up   • Foot Problem     [place], wants referral podiatrist       Prediabetes  Due for labs    Essential hypertension  Taking amlodipine as prescribed, doesn't check BPs at home. No headache/dizziness. No chest pain. No leg swelling    Bilateral foot pain  Would like referral to go back to podiatry. Went in 2019 and was going to have surgery but postponed d/t covid. Would like to follow up again now       Current medicines (including changes today)  Current Outpatient Medications   Medication Sig Dispense Refill   • VITAMIN D PO Take  by mouth.     • amLODIPine (NORVASC) 10 MG Tab TAKE 1 TABLET BY MOUTH  DAILY 90 Tablet 0     No current facility-administered medications for this visit.     She  has no past medical history on file.    ROS   No fever/chills. No weight change. No headache/dizziness. No focal weakness. No sore throat, nasal congestion, ear pain. No chest pain, no shortness of breath, difficulty breathing. No n/v/d/c or abdominal pain. No urinary complaint. No rash or skin lesion. No joint pain or swelling.       Objective:     /94 (BP Location: Left arm, Patient Position: Sitting)   Pulse 87   Temp 36.2 °C (97.2 °F) (Temporal)   Resp 20   Ht 1.702 m (5' 7\")   Wt 113 kg (249 lb 12.8 oz)   SpO2 96%  Body mass index is 39.12 kg/m².   Physical Exam:  Constitutional: WDWN, NAD  Skin: Warm, dry, good turgor, no rashes in visible areas.  Respiratory: Unlabored respiratory effort, lungs clear to auscultation, no wheezes, no ronchi.  Cardiovascular: Normal S1, S2, no murmur, no leg edema.  Psych: Alert and oriented x3, normal affect and mood.    Assessment and Plan:   The following treatment plan was discussed    1. Bilateral foot pain    - Referral to Podiatry    2. " Need for immunization against influenza    - Influenza Vaccine Quad Injection (PF)    3. Prediabetes    - HEMOGLOBIN A1C; Future    4. Wellness examination    - Comp Metabolic Panel; Future  - TSH WITH REFLEX TO FT4; Future    5. Screening for cardiovascular condition    - Lipid Profile; Future    6. Essential hypertension        Followup: after labs, informed patient that I am leaving [facility] and she will need to establish with new PCP

## 2022-03-15 NOTE — ASSESSMENT & PLAN NOTE
Would like referral to go back to podiatry. Went in 2019 and was going to have surgery but postponed d/t covid. Would like to follow up again now

## 2022-03-15 NOTE — ASSESSMENT & PLAN NOTE
Taking amlodipine as prescribed, doesn't check BPs at home. No headache/dizziness. No chest pain. No leg swelling

## 2022-05-11 RX ORDER — AMLODIPINE BESYLATE 10 MG/1
TABLET ORAL
Qty: 90 TABLET | Refills: 0 | Status: SHIPPED | OUTPATIENT
Start: 2022-05-11 | End: 2022-07-21 | Stop reason: SDUPTHER

## 2022-07-21 ENCOUNTER — OFFICE VISIT (OUTPATIENT)
Dept: MEDICAL GROUP | Facility: MEDICAL CENTER | Age: 62
End: 2022-07-21
Payer: COMMERCIAL

## 2022-07-21 VITALS
BODY MASS INDEX: 39.62 KG/M2 | WEIGHT: 252.43 LBS | OXYGEN SATURATION: 94 % | HEIGHT: 67 IN | DIASTOLIC BLOOD PRESSURE: 80 MMHG | HEART RATE: 76 BPM | TEMPERATURE: 97.5 F | SYSTOLIC BLOOD PRESSURE: 114 MMHG

## 2022-07-21 DIAGNOSIS — E78.00 ELEVATED LDL CHOLESTEROL LEVEL: ICD-10-CM

## 2022-07-21 DIAGNOSIS — I10 ESSENTIAL HYPERTENSION: ICD-10-CM

## 2022-07-21 DIAGNOSIS — R73.03 PREDIABETES: ICD-10-CM

## 2022-07-21 DIAGNOSIS — M79.671 PAIN OF RIGHT HEEL: ICD-10-CM

## 2022-07-21 DIAGNOSIS — R21 RASH: ICD-10-CM

## 2022-07-21 DIAGNOSIS — Z12.83 SKIN CANCER SCREENING: ICD-10-CM

## 2022-07-21 DIAGNOSIS — M21.612 BILATERAL BUNIONS: ICD-10-CM

## 2022-07-21 DIAGNOSIS — M21.611 BILATERAL BUNIONS: ICD-10-CM

## 2022-07-21 DIAGNOSIS — Z12.31 ENCOUNTER FOR SCREENING MAMMOGRAM FOR MALIGNANT NEOPLASM OF BREAST: ICD-10-CM

## 2022-07-21 PROBLEM — M79.672 BILATERAL FOOT PAIN: Status: RESOLVED | Noted: 2019-09-19 | Resolved: 2022-07-21

## 2022-07-21 PROCEDURE — 99214 OFFICE O/P EST MOD 30 MIN: CPT | Performed by: PHYSICIAN ASSISTANT

## 2022-07-21 RX ORDER — AMLODIPINE BESYLATE 10 MG/1
10 TABLET ORAL DAILY
Qty: 90 TABLET | Refills: 2 | Status: SHIPPED | OUTPATIENT
Start: 2022-07-21 | End: 2023-04-13

## 2022-07-21 RX ORDER — TRIAMCINOLONE ACETONIDE 1 MG/G
1 CREAM TOPICAL 2 TIMES DAILY
Qty: 80 G | Refills: 0 | Status: SHIPPED | OUTPATIENT
Start: 2022-07-21 | End: 2023-04-11

## 2022-07-21 NOTE — ASSESSMENT & PLAN NOTE
This is a pleasant 62-year-old female here today to establish care.  She has a history of hypertension.  It appears she needs a renewal of amlodipine.  Takes 10 mg daily.

## 2022-07-21 NOTE — ASSESSMENT & PLAN NOTE
Complains of a chronic rash on the tops of her arms bilaterally.  First on the left side and then on the right side.  They do itch.  She uses Aveeno as needed.  Also would like skin cancer screening.  Believes she has a precancerous lesion on the right hand.

## 2022-07-21 NOTE — PROGRESS NOTES
Subjective:   Gladis Ball is a 62 y.o. female here today for hypertension, elevated LDL, prediabetes, rash over arms, bilateral bunions and right heel pain.    Essential hypertension  This is a pleasant 62-year-old female here today to establish care.  She has a history of hypertension.  It appears she needs a renewal of amlodipine.  Takes 10 mg daily.    Elevated LDL cholesterol level  Also noted in her labs with an elevated LDL.  Total cholesterol normal range.    Prediabetes  A1c at 6.3.  Has been increasing over time.  Family history of diabetes.    Rash  Complains of a chronic rash on the tops of her arms bilaterally.  First on the left side and then on the right side.  They do itch.  She uses Aveeno as needed.  Also would like skin cancer screening.  Believes she has a precancerous lesion on the right hand.    Bilateral bunions  Chronic bunions with the right side being worse than left.  Believes she needs surgery.  Symptoms though get worse when she wears tight fitting shoes.    Pain of right heel  Complains of a chronic history of right heel pain.  States that there is a bump in the back of the heel that is bothersome.  The past was seen by podiatry.  Would like a second opinion.       Current medicines (including changes today)  Current Outpatient Medications   Medication Sig Dispense Refill   • amLODIPine (NORVASC) 10 MG Tab Take 1 Tablet by mouth every day. 90 Tablet 2   • triamcinolone acetonide (KENALOG) 0.1 % Cream Apply 1 Application topically 2 times a day. 80 g 0   • VITAMIN D PO Take 1 Capsule by mouth every day. 5000 units       No current facility-administered medications for this visit.     She  has no past medical history on file.    Social History and Family History were reviewed and updated.    ROS   No chest pain, no shortness of breath, no abdominal pain and all other systems were reviewed and are negative.       Objective:     There were no vitals taken for this visit. There is no  height or weight on file to calculate BMI.   Physical Exam:  Constitutional: Alert, no distress.  Skin: Warm, dry, good turgor.  On the posterior aspect of her bilateral forearms there are erythematous lesions.  Eye: Equal, round and reactive, conjunctiva clear, lids normal.  ENMT: Lips without lesions, good dentition, oropharynx clear.  Neck: Trachea midline, no masses.   Lymph: No cervical or supraclavicular lymphadenopathy  Respiratory: Unlabored respiratory effort, lungs appear clear, no wheezes.  Musculoskeletal: Right foot with a large bunion which is nontender.  Also there appears to be a raised hardened lesion on the right heel.  Tender.  Psych: Alert and oriented x3, normal affect and mood.        Assessment and Plan:   The following treatment plan was discussed    1. Essential hypertension  Chronic condition.  Stable.  Renewed amlodipine as directed.  - amLODIPine (NORVASC) 10 MG Tab; Take 1 Tablet by mouth every day.  Dispense: 90 Tablet; Refill: 2    2. Prediabetes  Chronic condition.  Concerned about developing diabetes.  A1c ordered to be performed next month nonfasting.  - HEMOGLOBIN A1C; Future    3. Elevated LDL cholesterol level  Chronic condition.  We will continue to monitor yearly.  In the future may order CT cardiac scoring.    4. Encounter for screening mammogram for malignant neoplasm of breast  Mammogram ordered.  Screening.  Overdue.  - MA-SCREENING MAMMO BILAT W/TOMOSYNTHESIS W/CAD; Future    5. Rash  New condition noted in chart but chronic.  Refer to dermatology.  Advised to use over-the-counter cream as directed.  Provided Kenalog to use as needed.  Use first for 3 to 5 days.  Avoid sun exposure.  - triamcinolone acetonide (KENALOG) 0.1 % Cream; Apply 1 Application topically 2 times a day.  Dispense: 80 g; Refill: 0  - Referral to Dermatology    6. Skin cancer screening  Referred to skin cancer Derm [facility] for skin cancer screening.  - Referral to Dermatology    7. Bilateral  bunions  Chronic condition.  Right worse than left.  Wear proper fitting shoes.  Referred to orthopedics for evaluation.  - Referral to Orthopedics    8. Pain of right heel  Chronic condition.  Seen in the past by podiatry.  Refer to [facility] and Dr. Hernandez.  - Referral to Orthopedics         Followup: Return in about 6 months (around 1/21/2023), or if symptoms worsen or fail to improve, for F/u Pap with Michell Bermudez PA-C.    Please note that this dictation was created using voice recognition software. I have made every reasonable attempt to correct obvious errors, but I expect that there are errors of grammar and possibly content that I did not discover before finalizing the note.

## 2022-07-21 NOTE — ASSESSMENT & PLAN NOTE
Complains of a chronic history of right heel pain.  States that there is a bump in the back of the heel that is bothersome.  The past was seen by podiatry.  Would like a second opinion.

## 2022-07-21 NOTE — ASSESSMENT & PLAN NOTE
Chronic bunions with the right side being worse than left.  Believes she needs surgery.  Symptoms though get worse when she wears tight fitting shoes.

## 2022-08-11 ENCOUNTER — APPOINTMENT (OUTPATIENT)
Dept: MEDICAL GROUP | Facility: MEDICAL CENTER | Age: 62
End: 2022-08-11
Payer: COMMERCIAL

## 2023-01-20 ENCOUNTER — APPOINTMENT (OUTPATIENT)
Dept: MEDICAL GROUP | Facility: MEDICAL CENTER | Age: 63
End: 2023-01-20
Payer: COMMERCIAL

## 2023-04-11 ENCOUNTER — APPOINTMENT (OUTPATIENT)
Dept: RADIOLOGY | Facility: IMAGING CENTER | Age: 63
End: 2023-04-11
Attending: PHYSICIAN ASSISTANT
Payer: COMMERCIAL

## 2023-04-11 ENCOUNTER — OFFICE VISIT (OUTPATIENT)
Dept: URGENT CARE | Facility: CLINIC | Age: 63
End: 2023-04-11
Payer: COMMERCIAL

## 2023-04-11 VITALS
TEMPERATURE: 97.6 F | HEART RATE: 92 BPM | OXYGEN SATURATION: 93 % | DIASTOLIC BLOOD PRESSURE: 80 MMHG | BODY MASS INDEX: 39.37 KG/M2 | SYSTOLIC BLOOD PRESSURE: 128 MMHG | RESPIRATION RATE: 15 BRPM | WEIGHT: 245 LBS | HEIGHT: 66 IN

## 2023-04-11 DIAGNOSIS — M54.6 ACUTE MIDLINE THORACIC BACK PAIN: ICD-10-CM

## 2023-04-11 DIAGNOSIS — M54.2 NECK PAIN: ICD-10-CM

## 2023-04-11 DIAGNOSIS — S66.912A STRAIN OF LEFT WRIST, INITIAL ENCOUNTER: ICD-10-CM
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
## 2023-04-11 DIAGNOSIS — M62.830 SPASM OF THORACIC BACK MUSCLE: ICD-10-CM

## 2023-04-11 DIAGNOSIS — S16.1XXA NECK STRAIN, INITIAL ENCOUNTER: ICD-10-CM

## 2023-04-11 PROCEDURE — 99214 OFFICE O/P EST MOD 30 MIN: CPT | Performed by: PHYSICIAN ASSISTANT

## 2023-04-11 PROCEDURE — 72040 X-RAY EXAM NECK SPINE 2-3 VW: CPT | Mod: TC | Performed by: PHYSICIAN ASSISTANT

## 2023-04-11 PROCEDURE — 72070 X-RAY EXAM THORAC SPINE 2VWS: CPT | Mod: TC | Performed by: PHYSICIAN ASSISTANT

## 2023-04-11 RX ORDER — CYCLOBENZAPRINE HCL 10 MG
10 TABLET ORAL 3 TIMES DAILY PRN
Qty: 30 TABLET | Refills: 0 | Status: SHIPPED
Start: 2023-04-11 | End: 2023-07-18

## 2023-04-11 ASSESSMENT — ENCOUNTER SYMPTOMS
COUGH: 0
TINGLING: 1
SHORTNESS OF BREATH: 0
SENSORY CHANGE: 0
CHILLS: 0
VOMITING: 0
FOCAL WEAKNESS: 0
WEAKNESS: 0
NAUSEA: 0
DIARRHEA: 0
FEVER: 0
BACK PAIN: 1
NECK PAIN: 1
ABDOMINAL PAIN: 0
MYALGIAS: 1
DOUBLE VISION: 0

## 2023-04-11 NOTE — LETTER
April 11, 2023         Patient: Gladis Ball   YOB: 1960   Date of Visit: 4/11/2023           To Whom it May Concern:    Gladis Ball was seen in my clinic on 4/11/2023. She should be excused from work from 4/11/2023- 4/14/2023 due to medical reasons.    If you have any questions or concerns, please don't hesitate to call.        Sincerely,           Anayeli Flaherty P.A.-C.  Electronically Signed
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
## 2023-05-20 NOTE — ASSESSMENT & PLAN NOTE
05/20/23 0931   Pain Assessment   Pain Assessment Tool 0-10   Pain Score 5   Pain Location/Orientation Orientation: Right;Location: Leg   Restrictions/Precautions   Precautions Bed/chair alarms; Fall Risk   RLE Weight Bearing Per Order NWB   RLE ROM Restriction   (only AROM RLE)   Sit to Lying   Type of Assistance Needed Independent   Physical Assistance Level No physical assistance   Sit to Lying CARE Score 6   Bed-Chair Transfer   Type of Assistance Needed Supervision   Physical Assistance Level No physical assistance   Chair/Bed-to-Chair Transfer CARE Score 4   Transfer Bed/Chair/Wheelchair   Limitations Noted In Balance; Endurance;LE Strength   Exercise Tools   UE Ergometer 5min forward, 5min backwards with rest break FDC through final 5 minutes   Other Exercise Tool 1 UE strengthening in bilat UE using 3# dumbbell, 2x15: elbow flexion/extension, protraction/retraction; pt requested to stop 2* shoulder and back pain   Other Exercise Tool 2 sanderbox x20 all planes of motion   Cognition   Overall Cognitive Status WFL   Arousal/Participation Alert; Responsive; Cooperative   Attention Within functional limits   Orientation Level Oriented X4   Memory Within functional limits   Following Commands Follows all commands and directions without difficulty   Assessment   Treatment Assessment Pt agreeable to OT session this AM, though reported fatigued and with increasing leg and shoulder/back pain  Received sitting upright in w/c  Pt completed ROM/strength exercises with fair tolerance and frequent rest breaks taken 2* pain and fatigue  Returned to supine at end of session  Pt continues with barriers to d/c of decreased strength throughout but especially RLE s/p tibia fx, decreased balance, NWB RLE, and decreased activity tolerance; all affect independence in self care and fxl transfers  Pt would benefit from continued skilled OT services in order to address listed barriers and prepare for safe d/c     Prognosis Good A1c at 6.3.  Has been increasing over time.  Family history of diabetes.   Problem List Decreased strength;Decreased range of motion;Decreased endurance; Impaired balance;Decreased safety awareness;Decreased skin integrity;Orthopedic restrictions;Pain   Plan   Treatment/Interventions ADL retraining;Functional transfer training;LE strengthening/ROM; Therapeutic exercise; Endurance training;Patient/family training;Equipment eval/education; Compensatory technique education;OT   Progress Progressing toward goals   OT Therapy Minutes   OT Time In 0930   OT Time Out 1023   OT Total Time (minutes) 53   OT Mode of treatment - Individual (minutes) 53

## 2023-06-20 ENCOUNTER — OFFICE VISIT (OUTPATIENT)
Dept: MEDICAL GROUP | Facility: MEDICAL CENTER | Age: 63
End: 2023-06-20
Payer: COMMERCIAL

## 2023-06-20 VITALS
TEMPERATURE: 97.4 F | DIASTOLIC BLOOD PRESSURE: 80 MMHG | BODY MASS INDEX: 39.55 KG/M2 | RESPIRATION RATE: 16 BRPM | HEART RATE: 95 BPM | SYSTOLIC BLOOD PRESSURE: 128 MMHG | WEIGHT: 246.1 LBS | OXYGEN SATURATION: 92 % | HEIGHT: 66 IN

## 2023-06-20 DIAGNOSIS — I10 ESSENTIAL HYPERTENSION: Chronic | ICD-10-CM

## 2023-06-20 DIAGNOSIS — Z12.83 SKIN CANCER SCREENING: ICD-10-CM
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
## 2023-06-20 DIAGNOSIS — Z12.11 COLON CANCER SCREENING: ICD-10-CM

## 2023-06-20 DIAGNOSIS — Z72.0 TOBACCO ABUSE: ICD-10-CM

## 2023-06-20 DIAGNOSIS — M54.2 NECK PAIN: ICD-10-CM

## 2023-06-20 DIAGNOSIS — F33.0 MILD EPISODE OF RECURRENT MAJOR DEPRESSIVE DISORDER (HCC): ICD-10-CM

## 2023-06-20 DIAGNOSIS — Z00.00 WELLNESS EXAMINATION: ICD-10-CM

## 2023-06-20 DIAGNOSIS — E78.00 ELEVATED LDL CHOLESTEROL LEVEL: Chronic | ICD-10-CM

## 2023-06-20 DIAGNOSIS — R07.89 CHEST DISCOMFORT: ICD-10-CM

## 2023-06-20 DIAGNOSIS — Z12.31 ENCOUNTER FOR SCREENING MAMMOGRAM FOR MALIGNANT NEOPLASM OF BREAST: ICD-10-CM

## 2023-06-20 DIAGNOSIS — R73.03 PREDIABETES: Chronic | ICD-10-CM

## 2023-06-20 PROBLEM — F32.A DEPRESSION: Status: ACTIVE | Noted: 2023-06-20
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
## 2023-06-20 PROCEDURE — 3074F SYST BP LT 130 MM HG: CPT | Performed by: BEHAVIOR ANALYST

## 2023-06-20 PROCEDURE — 99214 OFFICE O/P EST MOD 30 MIN: CPT | Performed by: BEHAVIOR ANALYST

## 2023-06-20 PROCEDURE — 3079F DIAST BP 80-89 MM HG: CPT | Performed by: BEHAVIOR ANALYST

## 2023-06-20 RX ORDER — BUPROPION HYDROCHLORIDE 150 MG/1
150 TABLET ORAL EVERY MORNING
Qty: 30 TABLET | Refills: 2 | Status: SHIPPED | OUTPATIENT
Start: 2023-06-20 | End: 2023-07-18 | Stop reason: SDUPTHER

## 2023-06-20 RX ORDER — CHLORAL HYDRATE 500 MG
CAPSULE ORAL
COMMUNITY

## 2023-06-20 ASSESSMENT — PATIENT HEALTH QUESTIONNAIRE - PHQ9
SUM OF ALL RESPONSES TO PHQ QUESTIONS 1-9: 5
CLINICAL INTERPRETATION OF PHQ2 SCORE: 2
5. POOR APPETITE OR OVEREATING: 1 - SEVERAL DAYS
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
## 2023-06-26 ENCOUNTER — HOSPITAL ENCOUNTER (OUTPATIENT)
Dept: RADIOLOGY | Facility: MEDICAL CENTER | Age: 63
End: 2023-06-26
Attending: BEHAVIOR ANALYST
Payer: COMMERCIAL

## 2023-06-26 ENCOUNTER — HOSPITAL ENCOUNTER (OUTPATIENT)
Dept: LAB | Facility: MEDICAL CENTER | Age: 63
End: 2023-06-26
Attending: BEHAVIOR ANALYST
Payer: COMMERCIAL

## 2023-06-26 DIAGNOSIS — M54.2 NECK PAIN: ICD-10-CM

## 2023-06-26 DIAGNOSIS — R73.03 PREDIABETES: Chronic | ICD-10-CM

## 2023-06-26 DIAGNOSIS — Z00.00 WELLNESS EXAMINATION: ICD-10-CM

## 2023-06-26 DIAGNOSIS — I10 ESSENTIAL HYPERTENSION: Chronic | ICD-10-CM

## 2023-06-26 DIAGNOSIS — Z12.31 ENCOUNTER FOR SCREENING MAMMOGRAM FOR MALIGNANT NEOPLASM OF BREAST: ICD-10-CM
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
## 2023-06-26 LAB
25(OH)D3 SERPL-MCNC: 21 NG/ML (ref 30–100)
ALBUMIN SERPL BCP-MCNC: 4.1 G/DL (ref 3.2–4.9)
ALBUMIN/GLOB SERPL: 1.2 G/DL
ALP SERPL-CCNC: 73 U/L (ref 30–99)
ALT SERPL-CCNC: 24 U/L (ref 2–50)
ANION GAP SERPL CALC-SCNC: 14 MMOL/L (ref 7–16)
AST SERPL-CCNC: 16 U/L (ref 12–45)
BILIRUB SERPL-MCNC: 0.4 MG/DL (ref 0.1–1.5)
BUN SERPL-MCNC: 10 MG/DL (ref 8–22)
CALCIUM ALBUM COR SERPL-MCNC: 9.7 MG/DL (ref 8.5–10.5)
CALCIUM SERPL-MCNC: 9.8 MG/DL (ref 8.5–10.5)
CHLORIDE SERPL-SCNC: 106 MMOL/L (ref 96–112)
CHOLEST SERPL-MCNC: 179 MG/DL (ref 100–199)
CO2 SERPL-SCNC: 22 MMOL/L (ref 20–33)
CREAT SERPL-MCNC: 0.82 MG/DL (ref 0.5–1.4)
EST. AVERAGE GLUCOSE BLD GHB EST-MCNC: 134 MG/DL
FASTING STATUS PATIENT QL REPORTED: NORMAL
GFR SERPLBLD CREATININE-BSD FMLA CKD-EPI: 81 ML/MIN/1.73 M 2
GLOBULIN SER CALC-MCNC: 3.3 G/DL (ref 1.9–3.5)
GLUCOSE SERPL-MCNC: 134 MG/DL (ref 65–99)
HBA1C MFR BLD: 6.3 % (ref 4–5.6)
HDLC SERPL-MCNC: 47 MG/DL
LDLC SERPL CALC-MCNC: 111 MG/DL
POTASSIUM SERPL-SCNC: 3.7 MMOL/L (ref 3.6–5.5)
PROT SERPL-MCNC: 7.4 G/DL (ref 6–8.2)
SODIUM SERPL-SCNC: 142 MMOL/L (ref 135–145)
TRIGL SERPL-MCNC: 104 MG/DL (ref 0–149)
TSH SERPL DL<=0.005 MIU/L-ACNC: 1.12 UIU/ML (ref 0.38–5.33)

## 2023-06-26 PROCEDURE — 80053 COMPREHEN METABOLIC PANEL: CPT

## 2023-06-26 PROCEDURE — 77063 BREAST TOMOSYNTHESIS BI: CPT

## 2023-06-26 PROCEDURE — 84443 ASSAY THYROID STIM HORMONE: CPT

## 2023-06-26 PROCEDURE — 72040 X-RAY EXAM NECK SPINE 2-3 VW: CPT

## 2023-06-26 PROCEDURE — 82306 VITAMIN D 25 HYDROXY: CPT

## 2023-06-26 PROCEDURE — 36415 COLL VENOUS BLD VENIPUNCTURE: CPT

## 2023-06-26 PROCEDURE — 80061 LIPID PANEL: CPT

## 2023-06-26 PROCEDURE — 72072 X-RAY EXAM THORAC SPINE 3VWS: CPT

## 2023-06-26 PROCEDURE — 83036 HEMOGLOBIN GLYCOSYLATED A1C: CPT

## 2023-07-04 DIAGNOSIS — I10 ESSENTIAL HYPERTENSION: ICD-10-CM

## 2023-07-05 RX ORDER — AMLODIPINE BESYLATE 10 MG/1
TABLET ORAL
Qty: 90 TABLET | Refills: 3 | Status: SHIPPED | OUTPATIENT
Start: 2023-07-05

## 2023-07-07 ENCOUNTER — HOSPITAL ENCOUNTER (OUTPATIENT)
Dept: RADIOLOGY | Facility: MEDICAL CENTER | Age: 63
End: 2023-07-07
Attending: BEHAVIOR ANALYST
Payer: COMMERCIAL

## 2023-07-07 DIAGNOSIS — R92.8 ABNORMAL MAMMOGRAM: ICD-10-CM

## 2023-07-07 PROCEDURE — 77065 DX MAMMO INCL CAD UNI: CPT | Mod: LT

## 2023-07-11 ENCOUNTER — HOSPITAL ENCOUNTER (OUTPATIENT)
Dept: RADIOLOGY | Facility: MEDICAL CENTER | Age: 63
End: 2023-07-11
Attending: BEHAVIOR ANALYST
Payer: COMMERCIAL

## 2023-07-11 DIAGNOSIS — R92.8 ABNORMAL FINDINGS ON DIAGNOSTIC IMAGING OF BREAST: ICD-10-CM

## 2023-07-11 LAB — PATHOLOGY CONSULT NOTE: NORMAL

## 2023-07-11 PROCEDURE — 88305 TISSUE EXAM BY PATHOLOGIST: CPT

## 2023-07-11 PROCEDURE — A4648 IMPLANTABLE TISSUE MARKER: HCPCS

## 2023-07-12 ENCOUNTER — TELEPHONE (OUTPATIENT)
Dept: RADIOLOGY | Facility: MEDICAL CENTER | Age: 63
End: 2023-07-12
Payer: COMMERCIAL

## 2023-07-18 ENCOUNTER — OFFICE VISIT (OUTPATIENT)
Dept: MEDICAL GROUP | Facility: MEDICAL CENTER | Age: 63
End: 2023-07-18
Payer: COMMERCIAL

## 2023-07-18 VITALS
WEIGHT: 242 LBS | DIASTOLIC BLOOD PRESSURE: 90 MMHG | HEART RATE: 98 BPM | RESPIRATION RATE: 16 BRPM | TEMPERATURE: 97.2 F | OXYGEN SATURATION: 93 % | BODY MASS INDEX: 38.89 KG/M2 | SYSTOLIC BLOOD PRESSURE: 130 MMHG | HEIGHT: 66 IN

## 2023-07-18 DIAGNOSIS — F33.0 MILD EPISODE OF RECURRENT MAJOR DEPRESSIVE DISORDER (HCC): Chronic | ICD-10-CM

## 2023-07-18 DIAGNOSIS — R73.03 PREDIABETES: Chronic | ICD-10-CM

## 2023-07-18 DIAGNOSIS — Z72.0 TOBACCO ABUSE: ICD-10-CM

## 2023-07-18 DIAGNOSIS — I10 ESSENTIAL HYPERTENSION: Chronic | ICD-10-CM
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
## 2023-07-18 DIAGNOSIS — Z23 NEED FOR VACCINATION: ICD-10-CM

## 2023-07-18 DIAGNOSIS — E78.00 ELEVATED LDL CHOLESTEROL LEVEL: Chronic | ICD-10-CM

## 2023-07-18 DIAGNOSIS — R79.89 LOW VITAMIN D LEVEL: ICD-10-CM

## 2023-07-18 PROBLEM — F32.A DEPRESSION: Chronic | Status: ACTIVE | Noted: 2023-06-20

## 2023-07-18 PROCEDURE — 90471 IMMUNIZATION ADMIN: CPT | Performed by: BEHAVIOR ANALYST

## 2023-07-18 PROCEDURE — 3075F SYST BP GE 130 - 139MM HG: CPT | Performed by: BEHAVIOR ANALYST

## 2023-07-18 PROCEDURE — 99214 OFFICE O/P EST MOD 30 MIN: CPT | Mod: 25 | Performed by: BEHAVIOR ANALYST

## 2023-07-18 PROCEDURE — 90677 PCV20 VACCINE IM: CPT | Performed by: BEHAVIOR ANALYST

## 2023-07-18 PROCEDURE — 3080F DIAST BP >= 90 MM HG: CPT | Performed by: BEHAVIOR ANALYST

## 2023-07-18 RX ORDER — ERGOCALCIFEROL 1.25 MG/1
50000 CAPSULE ORAL
Qty: 8 CAPSULE | Refills: 0 | Status: SHIPPED | OUTPATIENT
Start: 2023-07-18

## 2023-07-18 RX ORDER — BUPROPION HYDROCHLORIDE 300 MG/1
300 TABLET ORAL EVERY MORNING
Qty: 90 TABLET | Refills: 3 | Status: SHIPPED | OUTPATIENT
Start: 2023-07-18

## 2023-07-18 ASSESSMENT — PATIENT HEALTH QUESTIONNAIRE - PHQ9
SUM OF ALL RESPONSES TO PHQ QUESTIONS 1-9: 10
CLINICAL INTERPRETATION OF PHQ2 SCORE: 3
5. POOR APPETITE OR OVEREATING: 1 - SEVERAL DAYS

## 2023-07-18 ASSESSMENT — ENCOUNTER SYMPTOMS
DEPRESSION: 1
NERVOUS/ANXIOUS: 0
SHORTNESS OF BREATH: 0
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
## 2023-08-08 ENCOUNTER — APPOINTMENT (RX ONLY)
Dept: URBAN - METROPOLITAN AREA CLINIC 6 | Facility: CLINIC | Age: 63
Setting detail: DERMATOLOGY
End: 2023-08-08

## 2023-08-08 DIAGNOSIS — I78.8 OTHER DISEASES OF CAPILLARIES: ICD-10-CM | Status: INADEQUATELY CONTROLLED

## 2023-08-08 DIAGNOSIS — Z71.89 OTHER SPECIFIED COUNSELING: ICD-10-CM

## 2023-08-08 DIAGNOSIS — D18.0 HEMANGIOMA: ICD-10-CM

## 2023-08-08 DIAGNOSIS — L82.1 OTHER SEBORRHEIC KERATOSIS: ICD-10-CM

## 2023-08-08 DIAGNOSIS — D485 NEOPLASM OF UNCERTAIN BEHAVIOR OF SKIN: ICD-10-CM

## 2023-08-08 DIAGNOSIS — L81.4 OTHER MELANIN HYPERPIGMENTATION: ICD-10-CM

## 2023-08-08 DIAGNOSIS — D22 MELANOCYTIC NEVI: ICD-10-CM

## 2023-08-08 PROBLEM — D22.5 MELANOCYTIC NEVI OF TRUNK: Status: ACTIVE | Noted: 2023-08-08

## 2023-08-08 PROBLEM — D22.4 MELANOCYTIC NEVI OF SCALP AND NECK: Status: ACTIVE | Noted: 2023-08-08

## 2023-08-08 PROBLEM — D18.01 HEMANGIOMA OF SKIN AND SUBCUTANEOUS TISSUE: Status: ACTIVE | Noted: 2023-08-08

## 2023-08-08 PROBLEM — D48.5 NEOPLASM OF UNCERTAIN BEHAVIOR OF SKIN: Status: ACTIVE | Noted: 2023-08-08

## 2023-08-08 PROCEDURE — 99204 OFFICE O/P NEW MOD 45 MIN: CPT | Mod: 25

## 2023-08-08 PROCEDURE — ? SUNSCREEN TREATMENT REGIMEN

## 2023-08-08 PROCEDURE — 11103 TANGNTL BX SKIN EA SEP/ADDL: CPT

## 2023-08-08 PROCEDURE — ? BIOPSY BY SHAVE METHOD

## 2023-08-08 PROCEDURE — ? PRESCRIPTION

## 2023-08-08 PROCEDURE — ? COUNSELING

## 2023-08-08 PROCEDURE — 11102 TANGNTL BX SKIN SINGLE LES: CPT

## 2023-08-08 PROCEDURE — ? PRESCRIPTION MEDICATION MANAGEMENT

## 2023-08-08 RX ORDER — TRIAMCINOLONE ACETONIDE 1 MG/G
1 CREAM TOPICAL BID
Qty: 80 | Refills: 1 | Status: ERX | COMMUNITY
Start: 2023-08-08

## 2023-08-08 RX ADMIN — TRIAMCINOLONE ACETONIDE 1: 1 CREAM TOPICAL at 00:00

## 2023-08-08 ASSESSMENT — LOCATION DETAILED DESCRIPTION DERM
LOCATION DETAILED: LEFT DISTAL PRETIBIAL REGION
LOCATION DETAILED: LEFT MID-UPPER BACK
LOCATION DETAILED: RIGHT RADIAL DORSAL HAND
LOCATION DETAILED: RIGHT DISTAL PRETIBIAL REGION
LOCATION DETAILED: LEFT ULNAR DORSAL HAND
LOCATION DETAILED: SUPERIOR THORACIC SPINE
LOCATION DETAILED: PERIUMBILICAL SKIN
LOCATION DETAILED: MID-FRONTAL SCALP
LOCATION DETAILED: LEFT INFERIOR FOREHEAD
LOCATION DETAILED: LEFT SUPERIOR UPPER BACK
LOCATION DETAILED: EPIGASTRIC SKIN
LOCATION DETAILED: MIDDLE STERNUM

## 2023-08-08 ASSESSMENT — LOCATION ZONE DERM
LOCATION ZONE: SCALP
LOCATION ZONE: TRUNK
LOCATION ZONE: FACE
LOCATION ZONE: HAND
LOCATION ZONE: LEG

## 2023-08-08 ASSESSMENT — LOCATION SIMPLE DESCRIPTION DERM
LOCATION SIMPLE: UPPER BACK
LOCATION SIMPLE: LEFT PRETIBIAL REGION
LOCATION SIMPLE: LEFT UPPER BACK
LOCATION SIMPLE: ANTERIOR SCALP
LOCATION SIMPLE: RIGHT HAND
LOCATION SIMPLE: LEFT HAND
LOCATION SIMPLE: ABDOMEN
LOCATION SIMPLE: LEFT FOREHEAD
LOCATION SIMPLE: RIGHT PRETIBIAL REGION
LOCATION SIMPLE: CHEST

## 2023-08-08 NOTE — PROCEDURE: PRESCRIPTION MEDICATION MANAGEMENT
Render In Strict Bullet Format?: No
Plan: Recommended Vitamin C supplements
Detail Level: Zone
Initiate Treatment: Triamcinolone Cream
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
## 2024-05-21 ENCOUNTER — APPOINTMENT (OUTPATIENT)
Dept: CARDIOLOGY | Facility: MEDICAL CENTER | Age: 64
End: 2024-05-21
Attending: HOSPITALIST
Payer: COMMERCIAL

## 2024-05-21 ENCOUNTER — OFFICE VISIT (OUTPATIENT)
Dept: MEDICAL GROUP | Facility: MEDICAL CENTER | Age: 64
End: 2024-05-21
Payer: COMMERCIAL

## 2024-05-21 ENCOUNTER — TELEPHONE (OUTPATIENT)
Dept: MEDICAL GROUP | Facility: MEDICAL CENTER | Age: 64
End: 2024-05-21

## 2024-05-21 ENCOUNTER — HOSPITAL ENCOUNTER (OUTPATIENT)
Facility: MEDICAL CENTER | Age: 64
End: 2024-05-22
Attending: EMERGENCY MEDICINE | Admitting: HOSPITALIST
Payer: COMMERCIAL

## 2024-05-21 ENCOUNTER — APPOINTMENT (OUTPATIENT)
Dept: RADIOLOGY | Facility: MEDICAL CENTER | Age: 64
End: 2024-05-21
Attending: EMERGENCY MEDICINE
Payer: COMMERCIAL

## 2024-05-21 VITALS
TEMPERATURE: 97.5 F | OXYGEN SATURATION: 96 % | BODY MASS INDEX: 38.85 KG/M2 | WEIGHT: 241.73 LBS | SYSTOLIC BLOOD PRESSURE: 128 MMHG | HEART RATE: 102 BPM | DIASTOLIC BLOOD PRESSURE: 72 MMHG | HEIGHT: 66 IN

## 2024-05-21 DIAGNOSIS — R07.89 ATYPICAL CHEST PAIN: ICD-10-CM

## 2024-05-21 DIAGNOSIS — E66.01 CLASS 3 SEVERE OBESITY DUE TO EXCESS CALORIES WITH SERIOUS COMORBIDITY AND BODY MASS INDEX (BMI) OF 40.0 TO 44.9 IN ADULT (HCC): ICD-10-CM

## 2024-05-21 DIAGNOSIS — G43.109 OCULAR MIGRAINE: ICD-10-CM

## 2024-05-21 DIAGNOSIS — R40.0 DAYTIME SLEEPINESS: ICD-10-CM

## 2024-05-21 DIAGNOSIS — I48.91 ATRIAL FIBRILLATION, UNSPECIFIED TYPE (HCC): ICD-10-CM

## 2024-05-21 DIAGNOSIS — R94.39 ABNORMAL NUCLEAR STRESS TEST: ICD-10-CM

## 2024-05-21 DIAGNOSIS — I10 ESSENTIAL HYPERTENSION: Chronic | ICD-10-CM

## 2024-05-21 DIAGNOSIS — R06.83 SNORING: ICD-10-CM

## 2024-05-21 DIAGNOSIS — F17.200 TOBACCO DEPENDENCE: ICD-10-CM

## 2024-05-21 PROBLEM — R07.9 CHEST PAIN: Status: ACTIVE | Noted: 2024-05-21

## 2024-05-21 PROBLEM — R53.1 GENERALIZED WEAKNESS: Status: ACTIVE | Noted: 2024-05-21

## 2024-05-21 PROBLEM — D75.1 POLYCYTHEMIA: Status: ACTIVE | Noted: 2024-05-21

## 2024-05-21 PROBLEM — I51.7 CARDIOMEGALY: Status: ACTIVE | Noted: 2024-05-21

## 2024-05-21 PROBLEM — D72.829 LEUKOCYTOSIS: Status: ACTIVE | Noted: 2024-05-21

## 2024-05-21 LAB
ALBUMIN SERPL BCP-MCNC: 4.1 G/DL (ref 3.2–4.9)
ALBUMIN/GLOB SERPL: 1.1 G/DL
ALP SERPL-CCNC: 76 U/L (ref 30–99)
ALT SERPL-CCNC: 21 U/L (ref 2–50)
ANION GAP SERPL CALC-SCNC: 13 MMOL/L (ref 7–16)
AST SERPL-CCNC: 22 U/L (ref 12–45)
BASOPHILS # BLD AUTO: 0.6 % (ref 0–1.8)
BASOPHILS # BLD: 0.08 K/UL (ref 0–0.12)
BILIRUB SERPL-MCNC: 0.5 MG/DL (ref 0.1–1.5)
BUN SERPL-MCNC: 13 MG/DL (ref 8–22)
CALCIUM ALBUM COR SERPL-MCNC: 9.7 MG/DL (ref 8.5–10.5)
CALCIUM SERPL-MCNC: 9.8 MG/DL (ref 8.4–10.2)
CHLORIDE SERPL-SCNC: 106 MMOL/L (ref 96–112)
CO2 SERPL-SCNC: 22 MMOL/L (ref 20–33)
CREAT SERPL-MCNC: 0.84 MG/DL (ref 0.5–1.4)
EKG IMPRESSION: NORMAL
EOSINOPHIL # BLD AUTO: 0.09 K/UL (ref 0–0.51)
EOSINOPHIL NFR BLD: 0.6 % (ref 0–6.9)
ERYTHROCYTE [DISTWIDTH] IN BLOOD BY AUTOMATED COUNT: 47.7 FL (ref 35.9–50)
FLUAV RNA SPEC QL NAA+PROBE: NEGATIVE
FLUBV RNA SPEC QL NAA+PROBE: NEGATIVE
GFR SERPLBLD CREATININE-BSD FMLA CKD-EPI: 79 ML/MIN/1.73 M 2
GLOBULIN SER CALC-MCNC: 3.8 G/DL (ref 1.9–3.5)
GLUCOSE SERPL-MCNC: 95 MG/DL (ref 65–99)
HCT VFR BLD AUTO: 51.6 % (ref 37–47)
HGB BLD-MCNC: 17.1 G/DL (ref 12–16)
IMM GRANULOCYTES # BLD AUTO: 0.06 K/UL (ref 0–0.11)
IMM GRANULOCYTES NFR BLD AUTO: 0.4 % (ref 0–0.9)
LV EJECT FRACT MOD 2C 99903: 59.34
LV EJECT FRACT MOD 4C 99902: 47.25
LV EJECT FRACT MOD BP 99901: 53.62
LYMPHOCYTES # BLD AUTO: 3.14 K/UL (ref 1–4.8)
LYMPHOCYTES NFR BLD: 21.9 % (ref 22–41)
MCH RBC QN AUTO: 33.1 PG (ref 27–33)
MCHC RBC AUTO-ENTMCNC: 33.1 G/DL (ref 32.2–35.5)
MCV RBC AUTO: 99.8 FL (ref 81.4–97.8)
MONOCYTES # BLD AUTO: 1.38 K/UL (ref 0–0.85)
MONOCYTES NFR BLD AUTO: 9.6 % (ref 0–13.4)
NEUTROPHILS # BLD AUTO: 9.62 K/UL (ref 1.82–7.42)
NEUTROPHILS NFR BLD: 66.9 % (ref 44–72)
NRBC # BLD AUTO: 0 K/UL
NRBC BLD-RTO: 0 /100 WBC (ref 0–0.2)
NT-PROBNP SERPL IA-MCNC: 635 PG/ML (ref 0–125)
PLATELET # BLD AUTO: 289 K/UL (ref 164–446)
PMV BLD AUTO: 8.9 FL (ref 9–12.9)
POTASSIUM SERPL-SCNC: 4 MMOL/L (ref 3.6–5.5)
PROT SERPL-MCNC: 7.9 G/DL (ref 6–8.2)
RBC # BLD AUTO: 5.17 M/UL (ref 4.2–5.4)
RSV RNA SPEC QL NAA+PROBE: NEGATIVE
SARS-COV-2 RNA RESP QL NAA+PROBE: NOTDETECTED
SODIUM SERPL-SCNC: 141 MMOL/L (ref 135–145)
SPECIMEN SOURCE: NORMAL
TROPONIN T SERPL-MCNC: 10 NG/L (ref 6–19)
TROPONIN T SERPL-MCNC: 9 NG/L (ref 6–19)
TSH SERPL DL<=0.005 MIU/L-ACNC: 1.1 UIU/ML (ref 0.38–5.33)
WBC # BLD AUTO: 14.4 K/UL (ref 4.8–10.8)

## 2024-05-21 PROCEDURE — 3078F DIAST BP <80 MM HG: CPT | Performed by: FAMILY MEDICINE

## 2024-05-21 PROCEDURE — 99406 BEHAV CHNG SMOKING 3-10 MIN: CPT | Performed by: HOSPITALIST

## 2024-05-21 PROCEDURE — 93306 TTE W/DOPPLER COMPLETE: CPT | Mod: 26 | Performed by: INTERNAL MEDICINE

## 2024-05-21 PROCEDURE — 3074F SYST BP LT 130 MM HG: CPT | Performed by: FAMILY MEDICINE

## 2024-05-21 PROCEDURE — 93000 ELECTROCARDIOGRAM COMPLETE: CPT | Performed by: FAMILY MEDICINE

## 2024-05-21 PROCEDURE — 99223 1ST HOSP IP/OBS HIGH 75: CPT | Mod: 25 | Performed by: HOSPITALIST

## 2024-05-21 PROCEDURE — 99214 OFFICE O/P EST MOD 30 MIN: CPT | Performed by: FAMILY MEDICINE

## 2024-05-21 RX ORDER — CHOLECALCIFEROL (VITAMIN D3) 50 MCG
4000 TABLET ORAL DAILY
COMMUNITY

## 2024-05-21 RX ORDER — OXYCODONE HYDROCHLORIDE 5 MG/1
2.5 TABLET ORAL
Status: DISCONTINUED | OUTPATIENT
Start: 2024-05-21 | End: 2024-05-22 | Stop reason: HOSPADM

## 2024-05-21 RX ORDER — METOPROLOL TARTRATE 1 MG/ML
5 INJECTION, SOLUTION INTRAVENOUS
Status: DISCONTINUED | OUTPATIENT
Start: 2024-05-21 | End: 2024-05-22 | Stop reason: HOSPADM

## 2024-05-21 RX ORDER — ASPIRIN 81 MG/1
324 TABLET, CHEWABLE ORAL ONCE
Status: COMPLETED | OUTPATIENT
Start: 2024-05-21 | End: 2024-05-21

## 2024-05-21 RX ORDER — NITROGLYCERIN 0.4 MG/1
0.4 TABLET SUBLINGUAL
Status: DISCONTINUED | OUTPATIENT
Start: 2024-05-21 | End: 2024-05-22 | Stop reason: HOSPADM

## 2024-05-21 RX ORDER — BUPROPION HYDROCHLORIDE 150 MG/1
150 TABLET, EXTENDED RELEASE ORAL 2 TIMES DAILY
Status: DISCONTINUED | OUTPATIENT
Start: 2024-05-22 | End: 2024-05-22 | Stop reason: HOSPADM

## 2024-05-21 RX ORDER — ACETAMINOPHEN 325 MG/1
650 TABLET ORAL EVERY 6 HOURS PRN
Status: DISCONTINUED | OUTPATIENT
Start: 2024-05-21 | End: 2024-05-22 | Stop reason: HOSPADM

## 2024-05-21 RX ORDER — OXYCODONE HYDROCHLORIDE 5 MG/1
5 TABLET ORAL
Status: DISCONTINUED | OUTPATIENT
Start: 2024-05-21 | End: 2024-05-22 | Stop reason: HOSPADM

## 2024-05-21 RX ORDER — HYDROMORPHONE HYDROCHLORIDE 1 MG/ML
0.25 INJECTION, SOLUTION INTRAMUSCULAR; INTRAVENOUS; SUBCUTANEOUS
Status: DISCONTINUED | OUTPATIENT
Start: 2024-05-21 | End: 2024-05-22 | Stop reason: HOSPADM

## 2024-05-21 RX ORDER — ASPIRIN 81 MG/1
81 TABLET ORAL DAILY
Status: DISCONTINUED | OUTPATIENT
Start: 2024-05-22 | End: 2024-05-22 | Stop reason: HOSPADM

## 2024-05-21 RX ORDER — AMOXICILLIN 250 MG
2 CAPSULE ORAL 2 TIMES DAILY
Status: DISCONTINUED | OUTPATIENT
Start: 2024-05-21 | End: 2024-05-22 | Stop reason: HOSPADM

## 2024-05-21 RX ORDER — POLYETHYLENE GLYCOL 3350 17 G/17G
1 POWDER, FOR SOLUTION ORAL
Status: DISCONTINUED | OUTPATIENT
Start: 2024-05-21 | End: 2024-05-22 | Stop reason: HOSPADM

## 2024-05-21 RX ADMIN — ASPIRIN 324 MG: 81 TABLET, CHEWABLE ORAL at 13:45

## 2024-05-21 RX ADMIN — APIXABAN 5 MG: 5 TABLET, FILM COATED ORAL at 18:09

## 2024-05-21 RX ADMIN — SENNOSIDES AND DOCUSATE SODIUM 2 TABLET: 50; 8.6 TABLET ORAL at 18:09

## 2024-05-21 RX ADMIN — METOPROLOL TARTRATE 25 MG: 25 TABLET, FILM COATED ORAL at 15:44

## 2024-05-21 ASSESSMENT — ENCOUNTER SYMPTOMS
EYE DISCHARGE: 0
NERVOUS/ANXIOUS: 0
COUGH: 0
FEVER: 0
STRIDOR: 0
FOCAL WEAKNESS: 0
VOMITING: 0
EYE REDNESS: 0
CHILLS: 0
SHORTNESS OF BREATH: 1
PALPITATIONS: 1
MYALGIAS: 0
FLANK PAIN: 0
ABDOMINAL PAIN: 0
BRUISES/BLEEDS EASILY: 0

## 2024-05-21 ASSESSMENT — PATIENT HEALTH QUESTIONNAIRE - PHQ9
5. POOR APPETITE OR OVEREATING: 0 - NOT AT ALL
SUM OF ALL RESPONSES TO PHQ QUESTIONS 1-9: 13
CLINICAL INTERPRETATION OF PHQ2 SCORE: 6

## 2024-05-21 ASSESSMENT — CHA2DS2 SCORE
CHF OR LEFT VENTRICULAR DYSFUNCTION: NO
VASCULAR DISEASE: NO
HYPERTENSION: YES
CHA2DS2 VASC SCORE: 1
PRIOR STROKE OR TIA OR THROMBOEMBOLISM: NO
AGE 75 OR GREATER: NO
DIABETES: NO

## 2024-05-21 ASSESSMENT — PAIN DESCRIPTION - PAIN TYPE
TYPE: ACUTE PAIN
TYPE: ACUTE PAIN

## 2024-05-21 ASSESSMENT — FIBROSIS 4 INDEX: FIB4 SCORE: 1.01

## 2024-05-21 NOTE — ED TRIAGE NOTES
"Chief Complaint   Patient presents with    Chest Pain     Midsternal x at least 1 week    Tired     Stated \"all I do is sleep, sleep sleep\"     BP (!) 149/103   Pulse 90   Temp 36.2 °C (97.1 °F) (Temporal)   Resp 15   Ht 1.676 m (5' 6\")   Wt 110 kg (242 lb 4.6 oz)   SpO2 92%   BMI 39.11 kg/m²     Pt ambulated to ED w/ visitor for c/o ongoing CP for about one week and feeling more tired than normal, denies N/V, denies feeling short of breath.  Pt states went to see MD today, sent to ED for possible abnormal EKG.  "

## 2024-05-21 NOTE — ASSESSMENT & PLAN NOTE
The 10-year ASCVD risk score (Della SMITH, et al., 2019) is: 7.6%    Values used to calculate the score:      Age: 63 years      Sex: Female      Is Non- : No      Diabetic: No      Tobacco smoker: Yes      Systolic Blood Pressure: 128 mmHg      Is BP treated: No      HDL Cholesterol: 47 mg/dL      Total Cholesterol: 179 mg/dL    Atypical chest pain tender sternum /sternoclavicular joint   Quite persistent but also worsens with exertion   She is feeling well currnetly     Plan for cardiac work up   CXR   Labs   Stress testing   And follow up to review results     STRICT ER precautions discussed     EKG per my read atrial fibrillation  Plan she will have a friend drive her to [facility]  She is hemodynamically stable currently
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
## 2024-05-21 NOTE — H&P
"Hospital Medicine History & Physical Note    Date of Service  5/21/2024    Primary Care Physician  SHA Roberson    Consultants  None     Code Status  Full Code    Chief Complaint  Chief Complaint   Patient presents with    Chest Pain     Midsternal x at least 1 week    Tired     Stated \"all I do is sleep, sleep sleep\"     History of Presenting Illness  Gladis Ball is a 63 y.o. female with a past medical history of primary hypertension, likely untreated obstructive sleep apnea and elevated BMI who presented 5/21/2024 with generalized weakness, easy fatigability and exertional shortness of breath and chest pain.  The patient reports that she has not been feeling well over the past 1 week.  She has been tired all the time and she has been having progressively worsening easy fatigability and exertional shortness of breath.  She denies noticing any lower extremity pain redness or swelling..     I discussed the plan of care with emergency physician, and the patient.    Review of Systems  Review of Systems   Constitutional:  Positive for malaise/fatigue. Negative for chills and fever.   Eyes:  Negative for discharge and redness.   Respiratory:  Positive for shortness of breath. Negative for cough and stridor.    Cardiovascular:  Positive for chest pain and palpitations. Negative for leg swelling.        Easy fatigability   Gastrointestinal:  Negative for abdominal pain and vomiting.   Genitourinary:  Negative for flank pain.   Musculoskeletal:  Negative for myalgias.   Skin: Negative.    Neurological:  Negative for focal weakness.   Endo/Heme/Allergies:  Does not bruise/bleed easily.   Psychiatric/Behavioral:  The patient is not nervous/anxious.      Past Medical History   has a past medical history of Hypertension.    Surgical History   has a past surgical history that includes appendectomy and tubal ligation.     Family History  family history includes Breast Cancer in her maternal aunt and maternal " grandmother; Cancer in her maternal aunt and maternal grandmother; Diabetes in her mother; Glaucoma in her maternal uncle; Heart Disease in her paternal grandmother; Hypertension in her father.      Social History   reports that she has been smoking cigarettes. She has never used smokeless tobacco. She reports current alcohol use of about 6.0 oz of alcohol per week. She reports that she does not currently use drugs.    Allergies  Allergies   Allergen Reactions    Codeine Itching     All over body , no rash     Medications  Prior to Admission Medications   Prescriptions Last Dose Informant Patient Reported? Taking?   Cholecalciferol (VITAMIN D) 2000 UNIT Tab 5/15/2024 at am Patient Yes Yes   Sig: Take 4,000 Units by mouth every day.   Omega-3 1000 MG Cap 5/15/2024 at am Patient Yes Yes   Sig: Take 1,000 mg by mouth every day.   amLODIPine (NORVASC) 10 MG Tab 5/21/2024 at 0810 Patient No Yes   Sig: TAKE 1 TABLET BY MOUTH DAILY   Patient taking differently: Take 10 mg by mouth every day.   buPROPion (WELLBUTRIN XL) 300 MG XL tablet 5/15/2024 at am Patient No Yes   Sig: Take 1 Tablet by mouth every morning.   vitamin D2, Ergocalciferol, (DRISDOL) 1.25 MG (60088 UT) Cap capsule Not Taking Patient No No   Sig: Take 1 Capsule by mouth every 7 days.   Patient not taking: Reported on 5/21/2024      Facility-Administered Medications: None     Physical Exam  Temp:  [36.2 °C (97.1 °F)-36.4 °C (97.5 °F)] 36.2 °C (97.1 °F)  Pulse:  [] 73  Resp:  [15-42] 17  BP: (116-156)/() 131/98  SpO2:  [92 %-96 %] 95 %  Blood Pressure: (!) 149/103   Temperature: 36.2 °C (97.1 °F)   Pulse: 90   Respiration: 15   Pulse Oximetry: 92 %     Physical Exam  Constitutional:       General: She is not in acute distress.  HENT:      Head: Normocephalic and atraumatic.      Right Ear: External ear normal.      Left Ear: External ear normal.      Nose: No congestion or rhinorrhea.      Mouth/Throat:      Mouth: Mucous membranes are moist.       Pharynx: No oropharyngeal exudate or posterior oropharyngeal erythema.   Eyes:      General: No scleral icterus.        Right eye: No discharge.         Left eye: No discharge.      Conjunctiva/sclera: Conjunctivae normal.      Pupils: Pupils are equal, round, and reactive to light.   Cardiovascular:      Rate and Rhythm: Tachycardia present. Rhythm irregular.      Heart sounds:      No friction rub. No gallop.   Pulmonary:      Effort: Pulmonary effort is normal.      Comments: Saturating well on room air  Reduced air entry bilaterally basally  Abdominal:      General: Abdomen is flat. There is no distension.      Tenderness: There is no guarding.   Musculoskeletal:         General: No swelling.      Cervical back: Neck supple. No rigidity. No muscular tenderness.      Right lower leg: No edema.      Left lower leg: No edema.   Skin:     Capillary Refill: Capillary refill takes 2 to 3 seconds.      Coloration: Skin is not jaundiced or pale.      Findings: No bruising or erythema.   Neurological:      Mental Status: She is alert and oriented to person, place, and time.   Psychiatric:         Mood and Affect: Mood normal.         Judgment: Judgment normal.       Laboratory:  Recent Labs     05/21/24  1335   WBC 14.4*   RBC 5.17   HEMOGLOBIN 17.1*   HEMATOCRIT 51.6*   MCV 99.8*   MCH 33.1*   MCHC 33.1   RDW 47.7   PLATELETCT 289   MPV 8.9*     Recent Labs     05/21/24  1335   SODIUM 141   POTASSIUM 4.0   CHLORIDE 106   CO2 22   GLUCOSE 95   BUN 13   CREATININE 0.84   CALCIUM 9.8     Recent Labs     05/21/24  1335   ALTSGPT 21   ASTSGOT 22   ALKPHOSPHAT 76   TBILIRUBIN 0.5   GLUCOSE 95         Recent Labs     05/21/24  1335   NTPROBNP 635*         Recent Labs     05/21/24  1335   TROPONINT 9     Imaging:  DX-CHEST-PORTABLE (1 VIEW)   Final Result      Cardiomegaly.      EC-ECHOCARDIOGRAM COMPLETE W/O CONT    (Results Pending)     I patient reviewed patient EKG shows atrial fibrillation with a rate of 89.  There is no  ST elevation.  There is flattening of T waves in lead III.  QTc is 460.    Assessment/Plan:  Justification for Admission Status  I anticipate this patient is appropriate for observation status at this time because likely discharge after 1 midnight.    Patient will need a Telemetry bed on MEDICAL service the patient has chest pain.    * Chest pain- (present on admission)  Assessment & Plan  The 10-year ASCVD risk score (Della SMITH, et al., 2019) is: 13.4%    Values used to calculate the score:      Age: 63 years      Sex: Female      Is Non- : No      Diabetic: No      Tobacco smoker: Yes      Systolic Blood Pressure: 149 mmHg      Is BP treated: Yes      HDL Cholesterol: 47 mg/dL      Total Cholesterol: 179 mg/dL  EKG shows atrial fibrillation with a rate of 89.  There is no ST elevation.  There is flattening of T waves in lead III.  QTc is 460.  Initial troponin is within normal limits, I will trend  I ordered Stat EKG and troponin for recurrence of chest pain.   I will place on continuous cardiac monitoring.  Plan for stress test in the morning [ordered] as long as there are no significant EKG changes or significant troponin elevation     Cardiomegaly- (present on admission)  Assessment & Plan  Cardiomegaly on chest x-ray.  Will check an echocardiography to rule out heart failure    Tobacco dependence- (present on admission)  Assessment & Plan  I spent 4 minutes on Tobacco cessation education and counseling.   Resume Wellbutrin  I Discussed the benefits of quitting smoking and risks of continued smoking including cardiovascular disease, cancer and COPD.   Discussed the option of nicotine patch, and gum    Generalized weakness- (present on admission)  Assessment & Plan  Rule out COVID-19, influenza and RSV  I will check a thyroid function test.  I will check an echocardiography to rule out heart failure    AF (atrial fibrillation) (HCC)- (present on admission)  Assessment & Plan  EKG shows  atrial fibrillation with a rate of 89.  There is no ST elevation.  There is flattening of T waves in lead III.  QTc is 460.  I will check echocardiography.  I will check thyroid function.  I will start metoprolol with hold parameters  I will start PRN metoprolol sustained HR > 120   I will place on continuous cardiac monitoring.   [id number] Score = 2 with current information.  Echo pending  I discussed starting therapeutic anticoagulation. Patient agreeable, understands and accepts risks of potential bleeding.    I am starting anticoagulation with apixaban    Leukocytosis- (present on admission)  Assessment & Plan  Rule out COVID-19, influenza and RSV    Polycythemia- (present on admission)  Assessment & Plan  Likely secondary to untreated obstructive sleep apnea.  I recommended outpatient sleep study      VTE prophylaxis: SCDs/TEDs and therapeutic anticoagulation with apixaban

## 2024-05-21 NOTE — ED PROVIDER NOTES
"ED Provider Note    CHIEF COMPLAINT  Chief Complaint   Patient presents with    Chest Pain     Midsternal x at least 1 week    Tired     Stated \"all I do is sleep, sleep sleep\"       EXTERNAL RECORDS REVIEWED  Outpatient Notes Notes from primary care, patient EKG done in clinic which revealed questionable atrial fibrillation versus sinus rhythm with multiple PACs, significant artifact makes read difficult    SREEDHAR/JONATHAN      Gladis Ball is a 63 y.o. female who presents with chief complaint of chest pain.  Patient reports that she has had some general malaise as well.  Symptoms have been present since Thursday of last week, approximately 5 days ago.  Patient states that she started having some vague difficult to describe chest pain in her anterior chest which has been ongoing without any provoking or relieving factors since its onset.  She denies any obvious worsening of symptoms with exertion but does report that her exertional capacity has decreased over the last few days.  Stating that she feels short of breath very easily, and also has general malaise, feeling like she just wants to sleep all the time.  Patient denies any associated back pain.  She denies any significant lower extremity edema.  She has woken up gasping for breath a few times but denies any specific orthopnea.  Patient denies any fevers or chills or recent new medications.  Denies any hemoptysis  denies surgery, fracture or casted extremity within the last mo  denies use of estrogen containing OCP  denies hx of VTE. denies active or recently active ca      PAST MEDICAL HISTORY   has a past medical history of Hypertension.    SURGICAL HISTORY   has a past surgical history that includes appendectomy and tubal ligation.    FAMILY HISTORY  Family History   Problem Relation Age of Onset    Diabetes Mother     Hypertension Father     Breast Cancer Maternal Aunt     Cancer Maternal Aunt         breast    Glaucoma Maternal Uncle     Breast Cancer Maternal " "Grandmother     Cancer Maternal Grandmother         breast    Heart Disease Paternal Grandmother        SOCIAL HISTORY  Social History     Tobacco Use    Smoking status: Every Day     Types: Cigarettes    Smokeless tobacco: Never    Tobacco comments:     about 10 per day   Vaping Use    Vaping status: Some Days   Substance and Sexual Activity    Alcohol use: Yes     Alcohol/week: 6.0 oz     Types: 7 Cans of beer, 3 Shots of liquor per week    Drug use: Not Currently     Comment: denies    Sexual activity: Never     Birth control/protection: Post-Menopausal       CURRENT MEDICATIONS  Home Medications    **Home medications have not yet been reviewed for this encounter**         ALLERGIES  Allergies   Allergen Reactions    Codeine Itching     rash       PHYSICAL EXAM  VITAL SIGNS: BP (!) 149/103   Pulse 90   Temp 36.2 °C (97.1 °F) (Temporal)   Resp 15   Ht 1.676 m (5' 6\")   Wt 110 kg (242 lb 4.6 oz)   SpO2 92%   BMI 39.11 kg/m²    Physical Exam  Constitutional:       Appearance: She is well-developed.   HENT:      Head: Normocephalic and atraumatic.   Eyes:      Pupils: Pupils are equal, round, and reactive to light.   Cardiovascular:      Rate and Rhythm: Normal rate and regular rhythm.   Pulmonary:      Effort: Pulmonary effort is normal. No accessory muscle usage or respiratory distress.      Breath sounds: Normal breath sounds.   Chest:      Chest wall: No mass.   Abdominal:      General: Bowel sounds are normal.      Palpations: Abdomen is soft. There is no mass.      Tenderness: There is no abdominal tenderness.   Musculoskeletal:         General: Normal range of motion.      Right lower leg: No edema.      Left lower leg: No edema.   Skin:     General: Skin is warm.      Capillary Refill: Capillary refill takes less than 2 seconds.   Neurological:      General: No focal deficit present.      Mental Status: She is alert.   Psychiatric:         Mood and Affect: Mood normal. Mood is not anxious. "           EKG/LABS  Results for orders placed or performed during the hospital encounter of 05/21/24   EC-ECHOCARDIOGRAM COMPLETE W/O CONT   Result Value Ref Range    Eject.Frac. MOD BP 53.62     Eject.Frac. MOD 4C 47.25     Eject.Frac. MOD 2C 59.34    CBC WITH DIFFERENTIAL   Result Value Ref Range    WBC 14.4 (H) 4.8 - 10.8 K/uL    RBC 5.17 4.20 - 5.40 M/uL    Hemoglobin 17.1 (H) 12.0 - 16.0 g/dL    Hematocrit 51.6 (H) 37.0 - 47.0 %    MCV 99.8 (H) 81.4 - 97.8 fL    MCH 33.1 (H) 27.0 - 33.0 pg    MCHC 33.1 32.2 - 35.5 g/dL    RDW 47.7 35.9 - 50.0 fL    Platelet Count 289 164 - 446 K/uL    MPV 8.9 (L) 9.0 - 12.9 fL    Neutrophils-Polys 66.90 44.00 - 72.00 %    Lymphocytes 21.90 (L) 22.00 - 41.00 %    Monocytes 9.60 0.00 - 13.40 %    Eosinophils 0.60 0.00 - 6.90 %    Basophils 0.60 0.00 - 1.80 %    Immature Granulocytes 0.40 0.00 - 0.90 %    Nucleated RBC 0.00 0.00 - 0.20 /100 WBC    Neutrophils (Absolute) 9.62 (H) 1.82 - 7.42 K/uL    Lymphs (Absolute) 3.14 1.00 - 4.80 K/uL    Monos (Absolute) 1.38 (H) 0.00 - 0.85 K/uL    Eos (Absolute) 0.09 0.00 - 0.51 K/uL    Baso (Absolute) 0.08 0.00 - 0.12 K/uL    Immature Granulocytes (abs) 0.06 0.00 - 0.11 K/uL    NRBC (Absolute) 0.00 K/uL   CMP   Result Value Ref Range    Sodium 141 135 - 145 mmol/L    Potassium 4.0 3.6 - 5.5 mmol/L    Chloride 106 96 - 112 mmol/L    Co2 22 20 - 33 mmol/L    Anion Gap 13.0 7.0 - 16.0    Glucose 95 65 - 99 mg/dL    Bun 13 8 - 22 mg/dL    Creatinine 0.84 0.50 - 1.40 mg/dL    Calcium 9.8 8.4 - 10.2 mg/dL    Correct Calcium 9.7 8.5 - 10.5 mg/dL    AST(SGOT) 22 12 - 45 U/L    ALT(SGPT) 21 2 - 50 U/L    Alkaline Phosphatase 76 30 - 99 U/L    Total Bilirubin 0.5 0.1 - 1.5 mg/dL    Albumin 4.1 3.2 - 4.9 g/dL    Total Protein 7.9 6.0 - 8.2 g/dL    Globulin 3.8 (H) 1.9 - 3.5 g/dL    A-G Ratio 1.1 g/dL   TROPONIN   Result Value Ref Range    Troponin T 9 6 - 19 ng/L   proBrain Natriuretic Peptide, NT   Result Value Ref Range    NT-proBNP 635 (H) 0 - 125  pg/mL   TSH WITH REFLEX TO FT4   Result Value Ref Range    TSH 1.100 0.380 - 5.330 uIU/mL   CoV-2, Flu A/B, And RSV by PCR ([employer])    Specimen: Respirate   Result Value Ref Range    Influenza virus A RNA Negative Negative    Influenza virus B, PCR Negative Negative    RSV, PCR Negative Negative    SARS-CoV-2 by PCR NotDetected     SARS-CoV-2 Source NP Swab    ESTIMATED GFR   Result Value Ref Range    GFR (CKD-EPI) 79 >60 mL/min/1.73 m 2   EKG   Result Value Ref Range    Report       [facility] Emergency Dept.    Test Date:  2024  Pt Name:    ISABELLA FALCON               Department: [facility]  MRN:        [id number]                      [facility]  Gender:     Female                       Technician: [id number]  :        [id number]                   Requested By:IVETH CARIAS  Order #:    [id number]                    Reading MD: Yudith Petersen MD    Measurements  Intervals                                Axis  Rate:       89                           P:          0  AK:         0                            QRS:        42  QRSD:       86                           T:          37  QT:         378  QTc:        460    Interpretive Statements  EKG is atrial fibrillation with normal axis normal intervals no ST changes  concerning for acute regional ischemia, some poor R wave progression in  anterior leads  Electronically Signed On 2024 13:25:17 PDT by Yudith Petersen MD         I have independently interpreted this EKG    RADIOLOGY/PROCEDURES   I have independently interpreted the diagnostic imaging associated with this visit and am waiting the final reading from the radiologist.   My preliminary interpretation is as follows: Mild cardiomegaly, questionable cephalization    Radiologist interpretation:  EC-ECHOCARDIOGRAM COMPLETE W/O CONT   Final Result      DX-CHEST-PORTABLE (1 VIEW)   Final Result      Cardiomegaly.          COURSE & MEDICAL DECISION MAKING    ASSESSMENT, COURSE AND  PLAN  Care Narrative: Patient here with new onset atrial fibrillation.  This is confirmed on EKG done at outpatient facility which I reviewed.  Have also reviewed our EKG here which reveals atrial fib.  Unclear etiology.  Patient does have some symptoms of possible developing heart failure.  Will check chest x-ray and BNP.  Will also check TSH given patient's associated malaise.  Patient without any associated constitutional symptoms otherwise to suggest infectious etiology  Patient basic labs are all very reassuring outside of some minimal leukocytosis.  Patient's troponin is within normal limits.  I discussed the case with hospitalist who will admit the patient for echocardiogram, I have deferred the decision of a NOAC to the hospitalist but given patient aspirin.  Patient without any immediate risk factors for pulmonary embolus.  BNP not severely elevated.            DISPOSITION AND DISCUSSIONS  I have discussed management of the patient with the following physicians and ERIK's: Hospitalist      FINAL DIAGNOSIS  Atrial fibrillation, leukocytosis

## 2024-05-21 NOTE — ASSESSMENT & PLAN NOTE
She has been having headaches blurry vision episodic and possibly vertigo  as well   She has been working with opthal and dx with ocular migraine   But she has a lot of ongoing concerns and questions  She is not driving currently with her symptoms and I agree with this   We agree that she should continue working with opthal but also consult with neurology regarding assessment of her symptoms as well as role of imaging and treatment options      30+ min spent

## 2024-05-21 NOTE — ASSESSMENT & PLAN NOTE
I spent 4 minutes on Tobacco cessation education and counseling.   Resume Wellbutrin  I Discussed the benefits of quitting smoking and risks of continued smoking including cardiovascular disease, cancer and COPD.   Discussed the option of nicotine patch, and gum

## 2024-05-21 NOTE — ASSESSMENT & PLAN NOTE
The 10-year ASCVD risk score (Della SMITH, et al., 2019) is: 13.4%    Values used to calculate the score:      Age: 63 years      Sex: Female      Is Non- : No      Diabetic: No      Tobacco smoker: Yes      Systolic Blood Pressure: 149 mmHg      Is BP treated: Yes      HDL Cholesterol: 47 mg/dL      Total Cholesterol: 179 mg/dL  EKG shows atrial fibrillation with a rate of 89.  There is no ST elevation.  There is flattening of T waves in lead III.  QTc is 460.  Initial troponin is within normal limits, I will trend  I ordered Stat EKG and troponin for recurrence of chest pain.   I will place on continuous cardiac monitoring.  Plan for stress test in the morning [ordered] as long as there are no significant EKG changes or significant troponin elevation

## 2024-05-21 NOTE — ASSESSMENT & PLAN NOTE
Rule out COVID-19, influenza and RSV  I will check a thyroid function test.  I will check an echocardiography to rule out heart failure

## 2024-05-21 NOTE — ED NOTES
Med rec is complete per Patient at bedside.  Family/friend/caregiver present at time of interview with permission from Patient.    Allergies reviewed.    Has patient had any outside antibiotics in the last 30 days? N    Any Anticoagulants (rivaroxaban, apixaban, edoxaban, dabigatran, warfarin, enoxaparin) taken in the last 14 days? N     Not on chemo, Outside Infusions,  or Dialysis.    Pharmacy/Pharmacies Pt utilizes : [facility] [phone]  [facility] for all maintenance medications.

## 2024-05-21 NOTE — ASSESSMENT & PLAN NOTE
EKG shows atrial fibrillation with a rate of 89.  There is no ST elevation.  There is flattening of T waves in lead III.  QTc is 460.  I will check echocardiography.  I will check thyroid function.  I will start metoprolol with hold parameters  I will start PRN metoprolol sustained HR > 120   I will place on continuous cardiac monitoring.   [id number] Score = 2 with current information.  Echo pending  I discussed starting therapeutic anticoagulation. Patient agreeable, understands and accepts risks of potential bleeding.    I am starting anticoagulation with apixaban

## 2024-05-22 ENCOUNTER — PHARMACY VISIT (OUTPATIENT)
Dept: PHARMACY | Facility: MEDICAL CENTER | Age: 64
End: 2024-05-22
Payer: COMMERCIAL

## 2024-05-22 ENCOUNTER — APPOINTMENT (OUTPATIENT)
Dept: RADIOLOGY | Facility: MEDICAL CENTER | Age: 64
End: 2024-05-22
Attending: HOSPITALIST
Payer: COMMERCIAL

## 2024-05-22 VITALS
SYSTOLIC BLOOD PRESSURE: 138 MMHG | RESPIRATION RATE: 16 BRPM | OXYGEN SATURATION: 92 % | TEMPERATURE: 98.2 F | BODY MASS INDEX: 40.57 KG/M2 | HEIGHT: 66 IN | DIASTOLIC BLOOD PRESSURE: 84 MMHG | HEART RATE: 67 BPM | WEIGHT: 252.43 LBS

## 2024-05-22 PROBLEM — R07.9 CHEST PAIN: Status: RESOLVED | Noted: 2024-05-21 | Resolved: 2024-05-22

## 2024-05-22 PROBLEM — I34.0 MODERATE MITRAL REGURGITATION: Chronic | Status: ACTIVE | Noted: 2024-05-22

## 2024-05-22 LAB
ALBUMIN SERPL BCP-MCNC: 3.6 G/DL (ref 3.2–4.9)
ALBUMIN/GLOB SERPL: 1.1 G/DL
ALP SERPL-CCNC: 67 U/L (ref 30–99)
ALT SERPL-CCNC: 19 U/L (ref 2–50)
ANION GAP SERPL CALC-SCNC: 9 MMOL/L (ref 7–16)
AST SERPL-CCNC: 18 U/L (ref 12–45)
BILIRUB SERPL-MCNC: 0.5 MG/DL (ref 0.1–1.5)
BUN SERPL-MCNC: 14 MG/DL (ref 8–22)
CALCIUM ALBUM COR SERPL-MCNC: 9.3 MG/DL (ref 8.5–10.5)
CALCIUM SERPL-MCNC: 9 MG/DL (ref 8.4–10.2)
CHLORIDE SERPL-SCNC: 107 MMOL/L (ref 96–112)
CHOLEST SERPL-MCNC: 156 MG/DL (ref 100–199)
CO2 SERPL-SCNC: 22 MMOL/L (ref 20–33)
CREAT SERPL-MCNC: 0.73 MG/DL (ref 0.5–1.4)
ERYTHROCYTE [DISTWIDTH] IN BLOOD BY AUTOMATED COUNT: 47.8 FL (ref 35.9–50)
GFR SERPLBLD CREATININE-BSD FMLA CKD-EPI: 93 ML/MIN/1.73 M 2
GLOBULIN SER CALC-MCNC: 3.2 G/DL (ref 1.9–3.5)
GLUCOSE SERPL-MCNC: 106 MG/DL (ref 65–99)
HCT VFR BLD AUTO: 47.8 % (ref 37–47)
HDLC SERPL-MCNC: 39 MG/DL
HGB BLD-MCNC: 15.9 G/DL (ref 12–16)
LDLC SERPL CALC-MCNC: 89 MG/DL
MAGNESIUM SERPL-MCNC: 2.2 MG/DL (ref 1.5–2.5)
MCH RBC QN AUTO: 33.5 PG (ref 27–33)
MCHC RBC AUTO-ENTMCNC: 33.3 G/DL (ref 32.2–35.5)
MCV RBC AUTO: 100.6 FL (ref 81.4–97.8)
PLATELET # BLD AUTO: 251 K/UL (ref 164–446)
PMV BLD AUTO: 9 FL (ref 9–12.9)
POTASSIUM SERPL-SCNC: 4.3 MMOL/L (ref 3.6–5.5)
PROT SERPL-MCNC: 6.8 G/DL (ref 6–8.2)
RBC # BLD AUTO: 4.75 M/UL (ref 4.2–5.4)
SODIUM SERPL-SCNC: 138 MMOL/L (ref 135–145)
TRIGL SERPL-MCNC: 139 MG/DL (ref 0–149)
WBC # BLD AUTO: 11.7 K/UL (ref 4.8–10.8)

## 2024-05-22 PROCEDURE — RXMED WILLOW AMBULATORY MEDICATION CHARGE: Performed by: INTERNAL MEDICINE

## 2024-05-22 PROCEDURE — 99239 HOSP IP/OBS DSCHRG MGMT >30: CPT | Performed by: INTERNAL MEDICINE

## 2024-05-22 PROCEDURE — 78452 HT MUSCLE IMAGE SPECT MULT: CPT | Mod: 26 | Performed by: INTERNAL MEDICINE

## 2024-05-22 PROCEDURE — 93018 CV STRESS TEST I&R ONLY: CPT | Performed by: INTERNAL MEDICINE

## 2024-05-22 RX ORDER — ATORVASTATIN CALCIUM 10 MG/1
10 TABLET, FILM COATED ORAL NIGHTLY
Qty: 30 TABLET | Refills: 3 | Status: SHIPPED | OUTPATIENT
Start: 2024-05-22 | End: 2024-05-28 | Stop reason: SDUPTHER

## 2024-05-22 RX ORDER — REGADENOSON 0.08 MG/ML
INJECTION, SOLUTION INTRAVENOUS
Status: COMPLETED
Start: 2024-05-22 | End: 2024-05-22

## 2024-05-22 RX ORDER — AMINOPHYLLINE 25 MG/ML
100 INJECTION, SOLUTION INTRAVENOUS
Status: DISCONTINUED | OUTPATIENT
Start: 2024-05-22 | End: 2024-05-22 | Stop reason: HOSPADM

## 2024-05-22 RX ORDER — REGADENOSON 0.08 MG/ML
0.4 INJECTION, SOLUTION INTRAVENOUS ONCE
Status: COMPLETED | OUTPATIENT
Start: 2024-05-22 | End: 2024-05-22

## 2024-05-22 RX ADMIN — ASPIRIN 81 MG: 81 TABLET, COATED ORAL at 05:13

## 2024-05-22 RX ADMIN — REGADENOSON 0.4 MG: 0.08 INJECTION, SOLUTION INTRAVENOUS at 09:09

## 2024-05-22 RX ADMIN — APIXABAN 5 MG: 5 TABLET, FILM COATED ORAL at 05:13

## 2024-05-22 RX ADMIN — METOPROLOL TARTRATE 25 MG: 25 TABLET, FILM COATED ORAL at 05:13

## 2024-05-22 ASSESSMENT — PAIN DESCRIPTION - PAIN TYPE: TYPE: ACUTE PAIN

## 2024-05-22 NOTE — PROGRESS NOTES
Monitor Summary:  Rhythm: AFIB   Rate: 50 - 70  Ectopy: max 3.86 pause with HR drop to 34  0.-/ 0.08/ 0.-

## 2024-05-22 NOTE — PROGRESS NOTES
4 Eyes Skin Assessment Completed by FELICITA Khan and FELICITA Carson.    Head WDL  Ears WDL  Nose WDL  Mouth WDL  Neck WDL  Breast/Chest WDL  Shoulder Blades WDL  Spine WDL  (R) Arm/Elbow/Hand WDL  (L) Arm/Elbow/Hand WDL  Abdomen WDL  Groin WDL  Scrotum/Coccyx/Buttocks WDL  (R) Leg WDL  (L) Leg WDL  (R) Heel/Foot/Toe WDL  (L) Heel/Foot/Toe WDL          Devices In Places Tele Box      Interventions In Place N/A    Possible Skin Injury No    Pictures Uploaded Into Epic N/A  Wound Consult Placed N/A  RN Wound Prevention Protocol Ordered Yes

## 2024-05-22 NOTE — DIETARY
NUTRITION SERVICES: BMI - Pt with BMI >40 (=Body mass index is 40.74 kg/m².), weight measured via bed scale. Stand up scale wt = 109.9 kg, BMI 39.11. Class II Obesity. Edema: none documented in flowsheets. Class III obesity. Weight loss counseling not appropriate in acute care setting.     RECOMMEND - If appropriate at DC please refer to outpatient nutrition services for weight management.     RD will screen weekly per department policy; available PRN.

## 2024-05-22 NOTE — DISCHARGE INSTRUCTIONS
Instructions for home monitoring of blood pressure:    ** Please obtain an ARM blood pressure machine, if you do not have one. Please obtain machine that can also track heart rate, if possible.  Please DO NOT use wrist type machine.    1) Please continue to take your medications as directed  2)  If you have symptoms of dizziness or nausea or falling or syncope or blurry vision, please check your blood pressure. IF your blood pressure remains low (Systolic or top number less than 100mmHg) please hold your medication and speak with your primary care provider as soon as possible.  Please seek help at an emergency room if your blood pressure does not improve at home.  3)  Please adhere to a TYLER diet (limited sodium diet, 2 grams per day, no added salt to food) or diet recommended for cardiac, renal, low fat or diabetic diet you were on before.    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  2)  Please take blood pressure reading when you wake up, around lunch time and before sleeping.  3)  Please bring blood pressure diary to your primary care provider or cardiologist for blood pressure medication management.

## 2024-05-22 NOTE — PROGRESS NOTES
Report received from AM RN at 1840. Patient sitting up in bed, denies pain. Denies needs at this time. POC discussed with patient. Call light within reach. Bed alarm NOT indicated.

## 2024-05-22 NOTE — PROGRESS NOTES
Pt dc in stable condition. All vitals wnl. Iv removed. Heart monitor removed and left in pt room for next pt. Pt has all  belongings at dc. Pt has bedside prescriptions. New prescriptions reviewed at [place]. Pt given paper prescription and reviewed medication. Follow up appts reviewed with pt at [place]. No questions at [place].

## 2024-05-22 NOTE — DISCHARGE SUMMARY
"Discharge Summary    CHIEF COMPLAINT ON ADMISSION  Chief Complaint   Patient presents with    Chest Pain     Midsternal x at least 1 week    Tired     Stated \"all I do is sleep, sleep sleep\"       Reason for Admission  Abnormal EKG     Admission Date  5/21/2024    CODE STATUS  Full Code    HPI & HOSPITAL COURSE  This is \"Gladis Ball is a 63 y.o. female with a past medical history of primary hypertension, likely untreated obstructive sleep apnea and elevated BMI who presented 5/21/2024 with generalized weakness, easy fatigability and exertional shortness of breath and chest pain.  The patient reports that she has not been feeling well over the past 1 week.  She has been tired all the time and she has been having progressively worsening easy fatigability and exertional shortness of breath.  She denies noticing any lower extremity pain redness or swelling.\" (As per admitting physician Dr. Marvin in H&P).    I reviewed echocardiogram, LVEF 55 to 60%, mild concentric LVH, unable to determine diastolic function due to arrhythmia, enlarged RA, mildly dilated LA, moderate MR, no stenosis, no aortic stenosis or insufficiency, RVSP 30 mmHg, no pericardial effusion, normal aortic root and ascending aorta.    Patient was evaluated with a NM Lexiscan stress test which showed evidence of old scarring.  There is no acute ischemic events noted. There is scar tissue in the apical septal and mid anterior septal segments, apical inferior, apical inferior lateral and mid inferior lateral segments.  I explained these results to the patient.  This is contributed from her prolonged 40-pack-year tobacco use.  I counseled patient upon discharge for tobacco cessation again.    [id number] Score = 2  Pt was started on Eliquis by admitting hospitalist.  Patient was able to afford medication for $35.  Patient understood the risk and benefits of his Eliquis.    Upon discharge patient had no further chest pain.  He had prescribed her " Eliquis and metoprolol.  I gave her referrals for cardiology.  I did discuss with her about her blood pressure, recommend for 3 times daily checks, to make a diary and I did give her a referral for sleep study.  Patient mention she does snore, her BMI is 40.7, has hypertension.  She has STOP-BANG score of 3.      Therefore, she is discharged in good and stable condition to home with close outpatient follow-up.    The patient recovered much more quickly than anticipated on admission.    Discharge Date  05/22/2024    FOLLOW UP ITEMS POST DISCHARGE  With primary care provider  With cardiology  Sleep study    DISCHARGE DIAGNOSES  Principal Problem:    Chest pain (POA: Yes)  Active Problems:    Prediabetes (Chronic) (POA: Yes)      Overview: Lab Results       Component Value Date/Time        HBA1C 6.3 (H) 06/26/2023 0835        AVGLUC 134 06/26/2023 0835                   Admits she is not following a particular dietary regimen or exercise. She       generally feels unmotivated and fatigued.     Essential hypertension (Chronic) (POA: Yes)      Overview: Current Meds: Amlodipine 10 mg daily      Side effects: none      Home BP Log: not checking at home      Admits to some left sided chest pain discomfort off and on for the past       4-5 months.      She also admits to SOB with activity she associates with inactivity.       Admits to stress with family and depression.       Denies headaches, dizziness/lightheadedness          Polycythemia (POA: Yes)    Leukocytosis (POA: Yes)    AF (atrial fibrillation) (HCC) (POA: Yes)    Generalized weakness (POA: Yes)    Tobacco dependence (POA: Yes)    Cardiomegaly (POA: Yes)  Resolved Problems:    * No resolved hospital problems. *      FOLLOW UP  Future Appointments   Date Time Provider Department Center   6/6/2024 10:00 AM [facility]  [address]  [address]  [place] [id number]  [phone]    Go on  6/6/2024  Please continue your neck schedule appointment.    [facility]  [address]  [place] [id number]  [phone]  Call  Please call to see if your referral has been approved an appointment set up      MEDICATIONS ON DISCHARGE     Medication List        START taking these medications        Instructions   apixaban 5mg Tabs  Commonly known as: Eliquis   Take 1 Tablet by mouth 2 times a day for 30 days.  Dose: 5 mg     atorvastatin 10 MG Tabs  Commonly known as: Lipitor   Take 1 Tablet by mouth every evening for 30 days.  Dose: 10 mg     metoprolol tartrate 25 MG Tabs  Commonly known as: Lopressor   Take 0.5 Tablets by mouth 2 times a day for 30 days. Hold and retime if heart rate is less than 65  Dose: 12.5 mg            CONTINUE taking these medications        Instructions   amLODIPine 10 MG Tabs  Commonly known as: Norvasc   Doctor's comments: Requesting 1 year supply  TAKE 1 TABLET BY MOUTH DAILY     buPROPion 300 MG XL tablet  Commonly known as: Wellbutrin XL   Take 1 Tablet by mouth every morning.  Dose: 300 mg     Omega-3 1000 MG Caps   Take 1,000 mg by mouth every day.  Dose: 1,000 mg     vitamin D 2000 UNIT Tabs   Take 4,000 Units by mouth every day.  Dose: 4,000 Units            STOP taking these medications      vitamin D2 (Ergocalciferol) 1.25 MG (72629 UT) Caps capsule  Commonly known as: Drisdol              Allergies  Allergies   Allergen Reactions    Codeine Itching     All over body , no rash       DIET  Orders Placed This Encounter   Procedures    Diet Order Diet: Cardiac     Standing Status:   Standing     Number of Occurrences:   1     Order Specific Question:   Diet:     Answer:   Cardiac [6]       ACTIVITY  As tolerated.  Weight bearing as tolerated    CONSULTATIONS  None    PROCEDURES  NM Lexiscan stress test    LABORATORY  Lab Results   Component Value Date    SODIUM 138 05/22/2024    POTASSIUM 4.3 05/22/2024    CHLORIDE 107 05/22/2024    CO2 22  05/22/2024    GLUCOSE 106 (H) 05/22/2024    BUN 14 05/22/2024    CREATININE 0.73 05/22/2024        Lab Results   Component Value Date    WBC 11.7 (H) 05/22/2024    HEMOGLOBIN 15.9 05/22/2024    HEMATOCRIT 47.8 (H) 05/22/2024    PLATELETCT 251 05/22/2024      NM-CARDIAC STRESS TEST   Final Result      EC-ECHOCARDIOGRAM COMPLETE W/O CONT   Final Result      DX-CHEST-PORTABLE (1 VIEW)   Final Result      Cardiomegaly.          Total time of the discharge process exceeds 32 minutes.

## 2024-05-22 NOTE — CARE PLAN
The patient is Stable - Low risk of patient condition declining or worsening    Shift Goals  Clinical Goals: Stress test in AM  Patient Goals: Rest  Family Goals: ELINOR    Progress made toward(s) clinical / shift goals:    Problem: Knowledge Deficit - Standard  Goal: Patient and family/care givers will demonstrate understanding of plan of care, disease process/condition, diagnostic tests and medications  Outcome: Progressing       Patient is not progressing towards the following goals:

## 2024-05-28 ENCOUNTER — OFFICE VISIT (OUTPATIENT)
Dept: CARDIOLOGY | Facility: MEDICAL CENTER | Age: 64
End: 2024-05-28
Attending: INTERNAL MEDICINE
Payer: COMMERCIAL

## 2024-05-28 VITALS
SYSTOLIC BLOOD PRESSURE: 112 MMHG | OXYGEN SATURATION: 95 % | DIASTOLIC BLOOD PRESSURE: 68 MMHG | BODY MASS INDEX: 38.73 KG/M2 | WEIGHT: 241 LBS | HEIGHT: 66 IN | HEART RATE: 87 BPM | RESPIRATION RATE: 16 BRPM

## 2024-05-28 DIAGNOSIS — F17.200 TOBACCO DEPENDENCE: ICD-10-CM

## 2024-05-28 DIAGNOSIS — I48.91 ATRIAL FIBRILLATION, UNSPECIFIED TYPE (HCC): ICD-10-CM

## 2024-05-28 DIAGNOSIS — I34.0 MODERATE MITRAL REGURGITATION: Chronic | ICD-10-CM

## 2024-05-28 DIAGNOSIS — R94.39 ABNORMAL NUCLEAR STRESS TEST: ICD-10-CM

## 2024-05-28 DIAGNOSIS — I20.89 STABLE ANGINA (HCC): ICD-10-CM

## 2024-05-28 LAB — EKG IMPRESSION: NORMAL

## 2024-05-28 RX ORDER — ATORVASTATIN CALCIUM 40 MG/1
40 TABLET, FILM COATED ORAL NIGHTLY
Qty: 90 TABLET | Refills: 3 | Status: SHIPPED | OUTPATIENT
Start: 2024-05-28 | End: 2025-05-23

## 2024-05-28 RX ORDER — METOPROLOL SUCCINATE 50 MG/1
50 TABLET, EXTENDED RELEASE ORAL DAILY
Qty: 90 TABLET | Refills: 3 | Status: SHIPPED | OUTPATIENT
Start: 2024-05-28

## 2024-05-28 RX ORDER — ISOSORBIDE MONONITRATE 30 MG/1
30 TABLET, EXTENDED RELEASE ORAL EVERY MORNING
Qty: 90 TABLET | Refills: 3 | Status: SHIPPED | OUTPATIENT
Start: 2024-05-28

## 2024-05-28 ASSESSMENT — ENCOUNTER SYMPTOMS
ORTHOPNEA: 0
PND: 0
NAUSEA: 0
COUGH: 0
SHORTNESS OF BREATH: 1
DIZZINESS: 0
LOSS OF CONSCIOUSNESS: 0
BRUISES/BLEEDS EASILY: 1
PALPITATIONS: 0
HEADACHES: 1

## 2024-05-28 ASSESSMENT — FIBROSIS 4 INDEX: FIB4 SCORE: 1

## 2024-05-28 NOTE — PATIENT INSTRUCTIONS
We'll change the metoprolol tartrate to 25mg twice a day, finish your pill bottle. When you get the new pills of Metoprolol SUCCINATE take 50mg every evening.  This is for Afib and coronary artery disease    Start isosorbide mononitrate in the morning this is for chest pressure. This may cause a slight headache for the first few days you take it    Continue eliquis for your afib/stroke reduction    Increase atorvastatin to 40mg for stabilization of cholesterol plaque in your heart       
Essential hypertension

## 2024-05-28 NOTE — LETTER
May 28, 2024    To Whom It May Concern:         This is confirmation that Gladis Ball attended her scheduled appointment with Loretta Bains P.A.-C. on 5/28/24. She may return to work 6/3/24 with restriction of no lifting over 5lbs at work.          If you have any questions please do not hesitate to call me at the phone number listed below.    Sincerely,          Loretta Bains P.A.-C.  [phone]

## 2024-05-28 NOTE — PROGRESS NOTES
Cardiology Consultation Note    Date of note:    5/28/2024    Primary Care Provider: SHA Roberson  Referring Provider: Geovanni Spann MD, [facility] Hospitalist    Patient Name: Gladis Ball     YOB: 1960  MRN:              [id number]      Chief Complaint   Patient presents with    Hypertension     F/v Dx: Essential hypertension    Atrial Fibrillation     F/v Dx: AF (atrial fibrillation) (HCC)    Chest Pain     F/v Dx:  Atypical chest pain          Subjective:   Gladis Ball is a 63 y.o. female who presents today for initial consultation.    Current medical problems include hypertension, hyperlipidemia, current tobacco use, ocular migraines.    On 5/21/2024 Gladis presented to the emergency department with complaints of chest pressure, general malaise. Her initial EKG showed rate controlled atrial fibrillation.  This was a new diagnosis for her.  She was admitted to the observation unit.  During this time she had a nuclear medicine stress test which showed a small fixed defect in the apical septal and mid anteroseptal segments, small defect in the apical inferior, inferolateral and mid inferolateral segments.  SDS 0.  She had a transthoracic echocardiogram that showed normal left ventricular systolic function, mildly hypertrophied LV, no regional wall motion abnormalities.  She had moderate MR with eccentric posterior directed jet and biatrial enlargement.    She was discharged relation beta-blocker.  She is here for follow up. She has never been seen by Cardiology before.     She describes about 2-3 months of progressive shortness of breath and pressure across the chest with activities such as climbing the stairs or taking the trash out. She has no racing heart beat, dizziness, near syncope. She is tolerating her new medications well. Just easy bruising.     Her blood pressures are ranging from 130-140 systolic at home.     Gladis uses wellbutrin for smoking cessation,  smokes about 2 cigarettes per day.    She has no family history of premature coronary artery disease.      Past Medical History:   Diagnosis Date    Hypertension        Social History     Tobacco Use   Smoking Status Every Day    Types: Cigarettes   Smokeless Tobacco Never   Tobacco Comments    about 2 per day          Social History     Substance and Sexual Activity   Alcohol Use Yes    Alcohol/week: 6.0 oz    Types: 7 Cans of beer, 3 Shots of liquor per week         Family History   Problem Relation Age of Onset    Diabetes Mother     Hypertension Father     Breast Cancer Maternal Aunt     Cancer Maternal Aunt         breast    Glaucoma Maternal Uncle     Breast Cancer Maternal Grandmother     Cancer Maternal Grandmother         breast    Heart Disease Paternal Grandmother          Allergies   Allergen Reactions    Codeine Itching     All over body , no rash         Current Outpatient Medications   Medication Sig Dispense Refill    atorvastatin (LIPITOR) 40 MG Tab Take 1 Tablet by mouth every evening for 360 days. 90 Tablet 3    metoprolol SR (TOPROL XL) 50 MG TABLET SR 24 HR Take 1 Tablet by mouth every day. 90 Tablet 3    apixaban (ELIQUIS) 5mg Tab Take 1 Tablet by mouth 2 times a day for 360 days. 180 Tablet 3    isosorbide mononitrate SR (IMDUR) 30 MG TABLET SR 24 HR Take 1 Tablet by mouth every morning. 90 Tablet 3    Cholecalciferol (VITAMIN D) 2000 UNIT Tab Take 4,000 Units by mouth every day.      buPROPion (WELLBUTRIN XL) 300 MG XL tablet Take 1 Tablet by mouth every morning. 90 Tablet 3    amLODIPine (NORVASC) 10 MG Tab TAKE 1 TABLET BY MOUTH DAILY (Patient taking differently: Take 10 mg by mouth every day.) 90 Tablet 3    Omega-3 1000 MG Cap Take 1,000 mg by mouth every day.       No current facility-administered medications for this visit.         Review of Systems   Constitutional:  Negative for malaise/fatigue.   Respiratory:  Positive for shortness of breath. Negative for cough.    Cardiovascular:   "Positive for chest pain. Negative for palpitations, orthopnea, leg swelling and PND.   Gastrointestinal:  Negative for nausea.   Neurological:  Positive for headaches. Negative for dizziness and loss of consciousness.   Endo/Heme/Allergies:  Bruises/bleeds easily.          Objective:   /68 (BP Location: Left arm, Patient Position: Sitting, BP Cuff Size: Adult)   Pulse 87   Resp 16   Ht 1.676 m (5' 6\")   Wt 109 kg (241 lb)   SpO2 95%   BMI 38.90 kg/m²        Physical Exam  Vitals reviewed.   Constitutional:       Appearance: She is obese.   HENT:      Head: Normocephalic and atraumatic.   Cardiovascular:      Rate and Rhythm: Normal rate. Rhythm irregular.      Heart sounds: No murmur heard.  Pulmonary:      Effort: Pulmonary effort is normal. No respiratory distress.      Breath sounds: No rales.   Abdominal:      General: There is no distension.   Musculoskeletal:      Right lower leg: No edema.      Left lower leg: No edema.   Skin:     General: Skin is warm and dry.   Neurological:      General: No focal deficit present.      Mental Status: She is alert.      Gait: Gait normal.   Psychiatric:         Judgment: Judgment normal.          Cardiac Imaging and Procedures Review:    EKG 5/28/24 : atrial fibrillation with anterior infarct finding    Echo 5/24/24:   CONCLUSIONS  No prior study is available for comparison.   Normal left ventricular chamber size. Mild concentric left ventricular   hypertrophy. Normal left ventricular systolic function. The ejection   fraction is measured to be 55-60%. No regional wall motion   abnormalities. Diastolic function is difficult to assess with   arrhythmia.  Biatrial dilatation  Moderate mitral regurgitation with eccentric, posteriorly directed jet  Estimated RV systolic pressure of 30 mmHg.      Nuclear Perfusion Imaging (5/22/24):  NUCLEAR IMAGING INTERPRETATION   There is a small defect of mildly reduced uptake in the apical septal and mid      anteroseptal " segments in rest and stress images suggestive of scar.     There is a small defect of mildly reduced uptake in the apical inferior,    apical inferolateral, and mid inferolateral segments in rest and stress    images suggestive of scar.   No ischemia identified.   Global hypokinesis.  LV ejection fraction = 42%.   ECG INTERPRETATION   Negative stress ECG for ischemia.       Assessment:     1. Atrial fibrillation, unspecified type (HCC)  EKG    metoprolol SR (TOPROL XL) 50 MG TABLET SR 24 HR    apixaban (ELIQUIS) 5mg Tab      2. Moderate mitral regurgitation        3. Abnormal nuclear stress test  atorvastatin (LIPITOR) 40 MG Tab    isosorbide mononitrate SR (IMDUR) 30 MG TABLET SR 24 HR      4. Tobacco dependence  atorvastatin (LIPITOR) 40 MG Tab      5. Stable angina (HCC)  isosorbide mononitrate SR (IMDUR) 30 MG TABLET SR 24 HR           Medical Decision Making:  Today's Assessment / Plan:   After reviewing her symptoms and diagnoses I recommended going forward with LHC and then DCCV followed by EP consult for catheter ablation. Gladis prefers to try medical therapy first and spend more time discussing AFib with her friends before deciding.     Persistent atrial fibrillation  -New diagnosis made in the hospital.  We discussed rhythm control strategies including initial cardioversion followed by a long-term strategy such as catheter ablation or antiarrhythmic medications.  She is not interested in electrical cardioversion at this point but would like more information and more time to research these options  -Changed to metoprolol succinate  -[id number] (female, CAD, HTN). Continue eliquis 5mg bid    Abnormal stress test/stable angina  - increase to high intensity statin  - inc beta blocker  - start isosorbide mononitrate  - discussed options including CTA, med therapy or invasive LHC. Again Gladis wants to try medications first. We'll start the anti anginals and have her follow up closely. ER precautions  reviewed for unstable symptoms.     MR  - if proceed with cardioversion can do SHELLIE first to assess severity      Thank you for this consultation. Start med therapy, establish with an MD in our office.     Loretta Bains PA-C  [facility]

## 2024-06-02 DIAGNOSIS — I10 ESSENTIAL HYPERTENSION: ICD-10-CM

## 2024-06-06 ENCOUNTER — OFFICE VISIT (OUTPATIENT)
Dept: MEDICAL GROUP | Facility: MEDICAL CENTER | Age: 64
End: 2024-06-06
Payer: COMMERCIAL

## 2024-06-06 VITALS
BODY MASS INDEX: 39.51 KG/M2 | HEIGHT: 66 IN | OXYGEN SATURATION: 96 % | HEART RATE: 88 BPM | WEIGHT: 245.81 LBS | RESPIRATION RATE: 12 BRPM | TEMPERATURE: 97.3 F | SYSTOLIC BLOOD PRESSURE: 112 MMHG | DIASTOLIC BLOOD PRESSURE: 72 MMHG

## 2024-06-06 DIAGNOSIS — R73.03 PREDIABETES: Chronic | ICD-10-CM

## 2024-06-06 DIAGNOSIS — E78.00 ELEVATED LDL CHOLESTEROL LEVEL: Chronic | ICD-10-CM

## 2024-06-06 DIAGNOSIS — R79.89 LOW VITAMIN D LEVEL: ICD-10-CM

## 2024-06-06 DIAGNOSIS — I48.91 ATRIAL FIBRILLATION, UNSPECIFIED TYPE (HCC): ICD-10-CM

## 2024-06-06 DIAGNOSIS — D75.1 POLYCYTHEMIA: ICD-10-CM

## 2024-06-06 DIAGNOSIS — F17.200 TOBACCO DEPENDENCE: ICD-10-CM

## 2024-06-06 DIAGNOSIS — F33.0 MILD EPISODE OF RECURRENT MAJOR DEPRESSIVE DISORDER (HCC): Chronic | ICD-10-CM

## 2024-06-06 DIAGNOSIS — Z72.0 TOBACCO ABUSE: ICD-10-CM

## 2024-06-06 PROBLEM — Z12.83 SKIN CANCER SCREENING: Status: RESOLVED | Noted: 2022-07-21 | Resolved: 2024-06-06

## 2024-06-06 PROBLEM — M79.671 PAIN OF RIGHT HEEL: Status: RESOLVED | Noted: 2022-07-21 | Resolved: 2024-06-06

## 2024-06-06 PROCEDURE — 99214 OFFICE O/P EST MOD 30 MIN: CPT | Performed by: BEHAVIOR ANALYST

## 2024-06-06 PROCEDURE — 3074F SYST BP LT 130 MM HG: CPT | Performed by: BEHAVIOR ANALYST

## 2024-06-06 PROCEDURE — 3078F DIAST BP <80 MM HG: CPT | Performed by: BEHAVIOR ANALYST

## 2024-06-06 RX ORDER — BUPROPION HYDROCHLORIDE 300 MG/1
300 TABLET ORAL EVERY MORNING
Qty: 90 TABLET | Refills: 3 | Status: SHIPPED | OUTPATIENT
Start: 2024-06-06

## 2024-06-06 ASSESSMENT — FIBROSIS 4 INDEX: FIB4 SCORE: 1

## 2024-06-06 NOTE — PROGRESS NOTES
Subjective:   Verbal consent was acquired by the patient to use [employer] ambient listening note generation during this visit Yes    CC:    Chief Complaint   Patient presents with    Follow-Up     Er follow up           HISTORY OF THE PRESENT ILLNESS:   Gladis presents today   History of Present Illness  The patient is a 63-year-old female established patient here for ER follow-up. She presented to the ED on 05/21/2024 for chest pain and  fatigue. She was found to be in atrial fibrillation.    The patient saw Dr. Bentley in our office for chest pressure and fatigue,conducted an EKG and advised to visit the ER.  In ER she was found to be in atrial fibrillation, rate controlled.  She received a nuclear medicine stress test in the observation unit which found a small fixed defect in the apical septal area.  She also had an echo which showed normal left ventricular function but moderate mitral regurgitation.  She was discharged on a beta-blocker and anticoagulation. she was seen post discharge by cardiology, Loretta Bains PA-C who discussed possible angiogram and cardio ablation with her. She has an upcoming appointment with Dr. Michi Coronado in 3 weeks.  She reports occasional swelling in her feet and ankles, which she attributes to her prescription medications. She has a history of ankle swelling, which has worsened since she returned to work on Monday, which she attributes to sitting with her legs hanging down. She has been on amlodipine for an extended period. She experiences shortness of breath and chest pressure, for which she is prescribed isosorbide mononitrate, which provided some relief. She continues to experience nocturnal dyspnea, which she has been informed may be due to sleep apnea.  She has been referred for pulmonary and sleep study for evaluation.    The patient reduced her smoking to 2 to 3 cigarettes/day a week following her ER visit. She plans to reduce her smoking. She does not smoke at work.  "She continues to take Wellbutrin.      Current Outpatient Medications   Medication Sig    buPROPion (WELLBUTRIN XL) 300 MG XL tablet Take 1 Tablet by mouth every morning.    atorvastatin (LIPITOR) 40 MG Tab Take 1 Tablet by mouth every evening for 360 days.    metoprolol SR (TOPROL XL) 50 MG TABLET SR 24 HR Take 1 Tablet by mouth every day.    apixaban (ELIQUIS) 5mg Tab Take 1 Tablet by mouth 2 times a day for 360 days.    isosorbide mononitrate SR (IMDUR) 30 MG TABLET SR 24 HR Take 1 Tablet by mouth every morning.    Cholecalciferol (VITAMIN D) 2000 UNIT Tab Take 4,000 Units by mouth every day.    amLODIPine (NORVASC) 10 MG Tab TAKE 1 TABLET BY MOUTH DAILY (Patient taking differently: Take 10 mg by mouth every day.)    Omega-3 1000 MG Cap Take 1,000 mg by mouth every day.          ROS: See HPI        Objective:     Exam: /72 (BP Location: Left arm, Patient Position: Sitting)   Pulse 88   Temp 36.3 °C (97.3 °F) (Temporal)   Resp 12   Ht 1.676 m (5' 6\")   Wt 112 kg (245 lb 13 oz)   SpO2 96%   BMI 39.68 kg/m²   Body mass index is 39.68 kg/m².    Physical Exam  Constitutional:       Appearance: Normal appearance.   Cardiovascular:      Rate and Rhythm: Normal rate. Rhythm irregular.      Pulses: Normal pulses.      Heart sounds: Normal heart sounds.   Pulmonary:      Effort: Pulmonary effort is normal.      Breath sounds: Normal breath sounds.   Musculoskeletal:      Cervical back: Normal range of motion and neck supple.   Neurological:      Mental Status: She is alert.         Results  Laboratory Studies  White blood cell count was slightly elevated. Glucose was 106 and 95. Thyroid check was normal.       Assessment & Plan:     63 y.o. female with the following -     Assessment & Plan  1. Atrial fibrillation.  The patient's echocardiogram did not indicate any signs of heart failure. The administration of isosorbide mononitrate could potentially contribute to the mild edema in her legs. We will continue to " monitor her condition.    2. Sleep apnea.  A referral has been made to [facility] for a sleep study. Should she not be seen for an efficient period, she will inform us, and we will arrange for an external sleep study.    3. Elevated white blood cell count.  Her white blood cell count was slightly elevated during her hospital stay, which could be attributed to the stress of being ill. We will monitor her white blood cell count.    4. Vitamin D deficiency.  She will maintain her current regimen of 2000 units of vitamin D.    5. Smoking cessation.  She has made significant progress in her smoking habits. She will continue taking Wellbutrin.    Follow-up  The patient is scheduled for a follow-up visit in 2 to 3 months.    1. Prediabetes  - HEMOGLOBIN A1C; Future    2. Elevated LDL cholesterol level  -Chronic, recently switched to high-dose statin per cardiology.  Ordered follow-up labs for 2 months for reevaluation on atorvastatin, continue atorvastatin 40 mg daily  - Lipid Profile; Future  - HEPATIC FUNCTION PANEL; Future    3. Atrial fibrillation, unspecified type (HCC)  -Newly diagnosed and now followed by cardiology.  Probable relation to untreated sleep apnea.  Advised patient to contact me if unable to be seen by [facility] in a timely manner.    4. Tobacco dependence  - Smoking cessation counseling completed.  emphasized the importance of identifying and eliminating triggers. Medical and social consequences of continued tobacco use discussed.  Continue Wellbutrin 300 mg daily    5. Low vitamin D level  - patient instructed to start taking 2000iu vitamin D3 per day which can be obtained over-the-counter.     - VITAMIN D,25 HYDROXY (DEFICIENCY); Future    6. Polycythemia  -Reviewed ER lab results with patient.  Follow-up CBC in 2 months  - CBC WITH DIFFERENTIAL; Future    7. Mild episode of recurrent major depressive disorder (HCC)  - buPROPion (WELLBUTRIN XL) 300 MG XL tablet; Take 1 Tablet  by mouth every morning.  Dispense: 90 Tablet; Refill: 3    8. Tobacco abuse  - buPROPion (WELLBUTRIN XL) 300 MG XL tablet; Take 1 Tablet by mouth every morning.  Dispense: 90 Tablet; Refill: 3        Return in about 2 months (around 8/6/2024) for chronic conditions, Lab Results.    Please note that this dictation was created using voice recognition software. I have made every reasonable attempt to correct obvious errors, but I expect that there are errors of grammar and possibly content that I did not discover before finalizing the note.

## 2024-06-10 RX ORDER — AMLODIPINE BESYLATE 10 MG/1
TABLET ORAL
Qty: 90 TABLET | Refills: 3 | Status: SHIPPED | OUTPATIENT
Start: 2024-06-10

## 2024-06-12 ENCOUNTER — TELEPHONE (OUTPATIENT)
Dept: MEDICAL GROUP | Facility: MEDICAL CENTER | Age: 64
End: 2024-06-12
Payer: COMMERCIAL

## 2024-06-12 DIAGNOSIS — I48.91 ATRIAL FIBRILLATION, UNSPECIFIED TYPE (HCC): ICD-10-CM

## 2024-06-12 DIAGNOSIS — R40.0 DAYTIME SLEEPINESS: ICD-10-CM

## 2024-06-12 NOTE — TELEPHONE ENCOUNTER
----- Message from Mar Turpin, Med Ass't sent at 6/11/2024  5:30 PM PDT -----  Regarding: appointment to sleep pulmonology  Kishan  Pt left a mess stating that her taras with the sleep center is not until Oct. 18th.  Would you like a order form to send pt for a overnight sleep test. I saw your notes and this is what you recommended.  Mar :-)

## 2024-06-12 NOTE — TELEPHONE ENCOUNTER
I placed a referral to [facility], Dr. Young for the sleep medicine consult.  He will be able to see her much sooner.  Mar please let her know

## 2024-06-18 NOTE — TELEPHONE ENCOUNTER
Spoke with pt and provided with the information to call as follow:  [facility]  Phone: [phone]

## 2024-06-28 ENCOUNTER — OFFICE VISIT (OUTPATIENT)
Dept: CARDIOLOGY | Facility: MEDICAL CENTER | Age: 64
End: 2024-06-28
Attending: INTERNAL MEDICINE
Payer: COMMERCIAL

## 2024-06-28 VITALS
WEIGHT: 249 LBS | OXYGEN SATURATION: 93 % | HEART RATE: 89 BPM | DIASTOLIC BLOOD PRESSURE: 70 MMHG | HEIGHT: 66 IN | RESPIRATION RATE: 14 BRPM | BODY MASS INDEX: 40.02 KG/M2 | SYSTOLIC BLOOD PRESSURE: 106 MMHG

## 2024-06-28 DIAGNOSIS — I50.32 CHRONIC HEART FAILURE WITH PRESERVED EJECTION FRACTION (HFPEF) (HCC): ICD-10-CM

## 2024-06-28 DIAGNOSIS — I48.91 ATRIAL FIBRILLATION, UNSPECIFIED TYPE (HCC): ICD-10-CM

## 2024-06-28 DIAGNOSIS — R94.39 ABNORMAL NUCLEAR STRESS TEST: ICD-10-CM

## 2024-06-28 DIAGNOSIS — I34.0 MODERATE MITRAL REGURGITATION: ICD-10-CM

## 2024-06-28 DIAGNOSIS — Z79.899 ON POTASSIUM WASTING DIURETIC THERAPY: ICD-10-CM

## 2024-06-28 DIAGNOSIS — I48.19 PERSISTENT ATRIAL FIBRILLATION (HCC): Primary | ICD-10-CM

## 2024-06-28 PROCEDURE — 99213 OFFICE O/P EST LOW 20 MIN: CPT | Performed by: INTERNAL MEDICINE

## 2024-06-28 RX ORDER — POTASSIUM CHLORIDE 20 MEQ/1
20 TABLET, EXTENDED RELEASE ORAL 2 TIMES DAILY
Qty: 30 TABLET | Refills: 1 | Status: SHIPPED | OUTPATIENT
Start: 2024-06-28

## 2024-06-28 RX ORDER — FUROSEMIDE 20 MG/1
20 TABLET ORAL DAILY
Qty: 30 TABLET | Refills: 1 | Status: SHIPPED | OUTPATIENT
Start: 2024-06-28

## 2024-06-28 ASSESSMENT — FIBROSIS 4 INDEX: FIB4 SCORE: 1.02

## 2024-06-28 NOTE — PATIENT INSTRUCTIONS
Lasix 20mg with potassium 20meq daily  Apixaban 5mg AM and PM  Heart cath with SHELLIE (ultrasound) and cardioversion  Referral to EP team to discuss ablation

## 2024-06-28 NOTE — PROGRESS NOTES
"CARDIOLOGY established PATIENT:    PCP: SHA Roberson    1. Persistent atrial fibrillation (HCC)    2. Moderate mitral regurgitation    3. Abnormal nuclear stress test    4. Chronic heart failure with preserved ejection fraction (HFpEF) (HCC)    5. On potassium wasting diuretic therapy    6. Atrial fibrillation, unspecified type (HCC)        Gladis Ball is in for follow-up regarding history of A-fib, hypertension, abnormal cardiac stress test    Chief Complaint   Patient presents with    Hypertension    Atrial Fibrillation       History: Gladis Ball is a 64 y.o. female with history of obesity BMI 40.2, hypertension, dyslipidemia, prior tobacco use, ocular migraines is here for follow-up regarding A fib an abnormal cardiac stress test diagnosed after 5/2024 hospitalization.    Down to 1pack per week from 5 packs/week, on Wellbutrin.  Trying her best and very eager to quit.    Reports ongoing fatigue, lower extremity edema and dyspnea on exertion without chest pain or overt palpitations.  Denies syncope or presyncope.  Ran out of apixaban few days ago and has not received a refill from [facility] despite a prescription for 90 days in the chart.    Lives with parents  Works full time: provide O2 for veterans / admin  Smoked 1p per week, down from 5  Occasional alcohol use  No marijuana use    Normal TSH    PE:  /70 (BP Location: Left arm, Patient Position: Sitting, BP Cuff Size: Adult)   Pulse 89   Resp 14   Ht 1.676 m (5' 6\")   Wt 113 kg (249 lb)   SpO2 93%   BMI 40.19 kg/m²     Gen: well  HEENT: Symmetric face.  NECK: + JVD.   CARDIAC: irregular, Normal S1, S2, with a systolic murmur (faint systolic murmur mainly over left axillary area)  VASCULATURE: carotids are normal bilaterally without bruit  RESP: Fine bibasilar Rales  EXT: 2+ lower extremity edema  SKIN: Warm and dry  NEURO: No gross deficits  PSYCH: Appropriate affect, participates in conversation    The 10-year ASCVD risk " score (Della SMITH, et al., 2019) is: 7.6%    Past Medical History:   Diagnosis Date    Hypertension      Past Surgical History:   Procedure Laterality Date    APPENDECTOMY      TUBAL LIGATION       Allergies   Allergen Reactions    Codeine Itching     All over body , no rash     Outpatient Encounter Medications as of 6/28/2024   Medication Sig Dispense Refill    furosemide (LASIX) 20 MG Tab Take 1 Tablet by mouth every day. 30 Tablet 1    potassium chloride SA (KDUR) 20 MEQ Tab CR Take 1 Tablet by mouth 2 times a day. 30 Tablet 1    apixaban (ELIQUIS) 5mg Tab Take 1 Tablet by mouth 2 times a day. 60 Tablet 1    amLODIPine (NORVASC) 10 MG Tab TAKE 1 TABLET BY MOUTH DAILY 90 Tablet 3    buPROPion (WELLBUTRIN XL) 300 MG XL tablet Take 1 Tablet by mouth every morning. 90 Tablet 3    atorvastatin (LIPITOR) 40 MG Tab Take 1 Tablet by mouth every evening for 360 days. 90 Tablet 3    metoprolol SR (TOPROL XL) 50 MG TABLET SR 24 HR Take 1 Tablet by mouth every day. 90 Tablet 3    isosorbide mononitrate SR (IMDUR) 30 MG TABLET SR 24 HR Take 1 Tablet by mouth every morning. 90 Tablet 3    [DISCONTINUED] apixaban (ELIQUIS) 5mg Tab Take 1 Tablet by mouth 2 times a day for 360 days. 180 Tablet 3    Cholecalciferol (VITAMIN D) 2000 UNIT Tab Take 4,000 Units by mouth every day.      Omega-3 1000 MG Cap Take 1,000 mg by mouth every day.       No facility-administered encounter medications on file as of 6/28/2024.     Social History     Socioeconomic History    Marital status: Single     Spouse name: Not on file    Number of children: Not on file    Years of education: Not on file    Highest education level: Not on file   Occupational History    Not on file   Tobacco Use    Smoking status: Every Day     Types: Cigarettes    Smokeless tobacco: Never    Tobacco comments:     about 2 per day   Vaping Use    Vaping status: Some Days   Substance and Sexual Activity    Alcohol use: Yes     Alcohol/week: 6.0 oz     Types: 7 Cans of beer, 3  Shots of liquor per week    Drug use: Not Currently     Comment: denies    Sexual activity: Never     Birth control/protection: Post-Menopausal   Other Topics Concern     Service Not Asked    Blood Transfusions Not Asked    Caffeine Concern No    Occupational Exposure Not Asked    Hobby Hazards Not Asked    Sleep Concern Yes    Stress Concern Not Asked    Weight Concern Yes    Special Diet No    Back Care Not Asked    Exercise No    Bike Helmet Not Asked    Seat Belt Not Asked    Self-Exams Not Asked   Social History Narrative    Not on file     Social Determinants of Health     Financial Resource Strain: Not on file   Food Insecurity: Not on file   Transportation Needs: Not on file   Physical Activity: Not on file   Stress: Not on file   Social Connections: Not on file   Intimate Partner Violence: Not on file   Housing Stability: Not on file     Family History   Problem Relation Age of Onset    Diabetes Mother     Hypertension Father     Breast Cancer Maternal Aunt     Cancer Maternal Aunt         breast    Glaucoma Maternal Uncle     Breast Cancer Maternal Grandmother     Cancer Maternal Grandmother         breast    Heart Disease Paternal Grandmother          Studies    Lab Results   Component Value Date/Time    TSHULTRASEN 1.100 05/21/2024 1335        Lab Results   Component Value Date/Time    FREET4 0.79 03/06/2017 0919      Lab Results   Component Value Date/Time    HBA1C 6.3 (H) 06/26/2023 08:35 AM     Lab Results   Component Value Date/Time    CHOLSTRLTOT 156 05/22/2024 06:07 AM    LDL 89 05/22/2024 06:07 AM    HDL 39 (A) 05/22/2024 06:07 AM    TRIGLYCERIDE 139 05/22/2024 06:07 AM       Lab Results   Component Value Date/Time    SODIUM 138 05/22/2024 06:07 AM    POTASSIUM 4.3 05/22/2024 06:07 AM    CHLORIDE 107 05/22/2024 06:07 AM    CO2 22 05/22/2024 06:07 AM    GLUCOSE 106 (H) 05/22/2024 06:07 AM    BUN 14 05/22/2024 06:07 AM    CREATININE 0.73 05/22/2024 06:07 AM     Lab Results   Component Value  Date/Time    ALKPHOSPHAT 67 05/22/2024 06:07 AM    ASTSGOT 18 05/22/2024 06:07 AM    ALTSGPT 19 05/22/2024 06:07 AM    TBILIRUBIN 0.5 05/22/2024 06:07 AM        Echocardiogram:  No results found for this or any previous visit.      Assessment and Recommendations:    Problem List Items Addressed This Visit          Cardiology Medicine Problems    AF (atrial fibrillation) ([facility]) - Primary    Relevant Medications    furosemide (LASIX) 20 MG Tab    apixaban (ELIQUIS) 5mg Tab    Other Relevant Orders    REFERRAL TO CARDIOLOGY    EC-SHELLIE W/O CONT    CL-CARDIOVERSION    Chronic heart failure with preserved ejection fraction (HFpEF) (HCC)    Relevant Medications    furosemide (LASIX) 20 MG Tab    apixaban (ELIQUIS) 5mg Tab       Other    Moderate mitral regurgitation (Chronic)    Relevant Medications    furosemide (LASIX) 20 MG Tab    apixaban (ELIQUIS) 5mg Tab    Other Relevant Orders    EC-SHELLIE W/O CONT    Abnormal nuclear stress test    Relevant Orders    CL-LEFT HEART CATHETERIZATION WITH POSSIBLE INTERVENTION    On potassium wasting diuretic therapy    Relevant Medications    potassium chloride SA (KDUR) 20 MEQ Tab CR     Discussed with her at length today the importance of sinus rhythm restoration especially given her HFpEF symptoms.      Given an abnormal cardiac stress test (personally reviewed, likely artifact but difficult to exclude underlying infarct) we also agreed importance of excluding obstructive CAD before proceeding with flecainide which is 1 of antiarrhythmic medications that I am hoping to prescribe until she meets with the EP team and arrange for ablation.  We also placed a referral to EP to discuss and arrange A-fib ablation for potential long-term plan for sinus rhythm restoration.  Maintain apixaban 5 mg twice daily for the time being.  Discussed with her also the temporary option of cardioversion after SHELLIE which she agrees to and a SHELLIE will also help better characterize her mitral regurgitation  characteristic in severity.  If severe MR concerns, will address accordingly.    Ordered SHELLIE with cardioversion along with left heart catheterization/coronary angiogram possible PCI to be arranged soon.    Prescribed Lasix 20 mg daily with potassium supplementation in the interim.  She prefers to avoid medications if possible, based on her progress with the plan suggested today, we will discuss adding SGLT inhibitors in her next visit.    On high intensity statin    Discussed risks, benefits, rationale, appropriateness and alternatives to coronary angiography with IV sedation in great detail with the patient.  Complications including but not limited to death, stroke, MI, urgent bypass surgery, contrast nephropathy, vascular complications, bleeding and infection were explained.  In addition, we discussed that 10% of patients will experience small to moderate bruising at the side of the arterial puncture.  Risks of major complications such as heart attack or stroke caused by the angiogram is less than 1%; the risk of death is approximately 1 in 1000.  The potential outcomes associated with the procedure (possible PCI, possible CABG, possible medical Rx only) were also discussed at length.  Patient voices understanding and with shared decision making, is in agreement to proceed.     Thank you for the opportunity to be involved in Gladis Ball 's  complex cardiovascular care; and please reach out with any questions or concerns.     () Today's E/M visit is associated with medical care services that serve as the continuing focal point for all needed health care services and/or with medical care services that  are part of ongoing cardiac care related to a patient's single, serious condition, or a complex condition: This includes  furnishing services to patients on an ongoing basis that result in care that is personalized  to the patient. The services result in a comprehensive, longitudinal, and continuous   relationship with the patient and involve delivery of team-based care that is accessible, coordinated with other practitioners and providers, and integrated with the broader health  care landscape.     Return in about 3 months (around 9/28/2024).    Mihci Henao MD, MPH [facility]  Interventional Cardiologist  [facility]   of Clinical Internal Medicine - [facility]    ~ Portions of this note were completed using voice recognition software (Dragon Naturally speaking software) . Occasional transcription errors may have escaped proof reading. I have made every reasonable attempt to correct obvious errors, but I expect that there are errors of grammar and possibly content that I did not discover before finalizing the note. ~

## 2024-07-03 ENCOUNTER — TELEPHONE (OUTPATIENT)
Dept: CARDIOLOGY | Facility: MEDICAL CENTER | Age: 64
End: 2024-07-03
Payer: COMMERCIAL

## 2024-07-10 ENCOUNTER — APPOINTMENT (OUTPATIENT)
Dept: ADMISSIONS | Facility: MEDICAL CENTER | Age: 64
End: 2024-07-10
Attending: INTERNAL MEDICINE
Payer: COMMERCIAL

## 2024-07-15 ENCOUNTER — PRE-ADMISSION TESTING (OUTPATIENT)
Dept: ADMISSIONS | Facility: MEDICAL CENTER | Age: 64
End: 2024-07-15
Attending: INTERNAL MEDICINE
Payer: COMMERCIAL

## 2024-07-23 DIAGNOSIS — I48.19 PERSISTENT ATRIAL FIBRILLATION (HCC): ICD-10-CM

## 2024-07-23 RX ORDER — APIXABAN 5 MG/1
5 TABLET, FILM COATED ORAL 2 TIMES DAILY
Qty: 180 TABLET | Refills: 3 | Status: SHIPPED | OUTPATIENT
Start: 2024-07-23

## 2024-07-29 ENCOUNTER — PRE-ADMISSION TESTING (OUTPATIENT)
Dept: ADMISSIONS | Facility: MEDICAL CENTER | Age: 64
End: 2024-07-29
Attending: INTERNAL MEDICINE
Payer: COMMERCIAL

## 2024-07-29 DIAGNOSIS — Z01.810 PRE-OPERATIVE CARDIOVASCULAR EXAMINATION: ICD-10-CM

## 2024-07-29 DIAGNOSIS — Z01.812 PRE-OPERATIVE LABORATORY EXAMINATION: ICD-10-CM

## 2024-07-29 LAB
ALBUMIN SERPL BCP-MCNC: 3.9 G/DL (ref 3.2–4.9)
ALBUMIN/GLOB SERPL: 1.1 G/DL
ALP SERPL-CCNC: 79 U/L (ref 30–99)
ALT SERPL-CCNC: 9 U/L (ref 2–50)
ANION GAP SERPL CALC-SCNC: 14 MMOL/L (ref 7–16)
APTT PPP: 30.9 SEC (ref 24.7–36)
AST SERPL-CCNC: 11 U/L (ref 12–45)
BILIRUB SERPL-MCNC: 0.4 MG/DL (ref 0.1–1.5)
BUN SERPL-MCNC: 16 MG/DL (ref 8–22)
CALCIUM ALBUM COR SERPL-MCNC: 9.8 MG/DL (ref 8.5–10.5)
CALCIUM SERPL-MCNC: 9.7 MG/DL (ref 8.5–10.5)
CHLORIDE SERPL-SCNC: 106 MMOL/L (ref 96–112)
CO2 SERPL-SCNC: 21 MMOL/L (ref 20–33)
CREAT SERPL-MCNC: 1.16 MG/DL (ref 0.5–1.4)
EKG IMPRESSION: NORMAL
ERYTHROCYTE [DISTWIDTH] IN BLOOD BY AUTOMATED COUNT: 47.8 FL (ref 35.9–50)
GFR SERPLBLD CREATININE-BSD FMLA CKD-EPI: 53 ML/MIN/1.73 M 2
GLOBULIN SER CALC-MCNC: 3.4 G/DL (ref 1.9–3.5)
GLUCOSE SERPL-MCNC: 101 MG/DL (ref 65–99)
HCT VFR BLD AUTO: 45.8 % (ref 37–47)
HGB BLD-MCNC: 15.4 G/DL (ref 12–16)
INR PPP: 1.18 (ref 0.87–1.13)
MCH RBC QN AUTO: 33.6 PG (ref 27–33)
MCHC RBC AUTO-ENTMCNC: 33.6 G/DL (ref 32.2–35.5)
MCV RBC AUTO: 100 FL (ref 81.4–97.8)
PLATELET # BLD AUTO: 279 K/UL (ref 164–446)
PMV BLD AUTO: 9.7 FL (ref 9–12.9)
POTASSIUM SERPL-SCNC: 4.2 MMOL/L (ref 3.6–5.5)
PROT SERPL-MCNC: 7.3 G/DL (ref 6–8.2)
PROTHROMBIN TIME: 15.1 SEC (ref 12–14.6)
RBC # BLD AUTO: 4.58 M/UL (ref 4.2–5.4)
SODIUM SERPL-SCNC: 141 MMOL/L (ref 135–145)
WBC # BLD AUTO: 16.3 K/UL (ref 4.8–10.8)

## 2024-07-29 PROCEDURE — 80053 COMPREHEN METABOLIC PANEL: CPT

## 2024-07-29 PROCEDURE — 85027 COMPLETE CBC AUTOMATED: CPT

## 2024-07-29 PROCEDURE — 36415 COLL VENOUS BLD VENIPUNCTURE: CPT

## 2024-07-29 PROCEDURE — 93005 ELECTROCARDIOGRAM TRACING: CPT

## 2024-07-29 PROCEDURE — 93010 ELECTROCARDIOGRAM REPORT: CPT | Performed by: INTERNAL MEDICINE

## 2024-07-29 PROCEDURE — 85730 THROMBOPLASTIN TIME PARTIAL: CPT

## 2024-07-29 PROCEDURE — 85610 PROTHROMBIN TIME: CPT

## 2024-07-30 NOTE — OR NURSING
Kirill Manuel procedure scheduling that Gladis has abnormal lab values and to have them addressed by Nurse or Physician.  She will call me back if any questions

## 2024-08-02 ENCOUNTER — HOSPITAL ENCOUNTER (OUTPATIENT)
Facility: MEDICAL CENTER | Age: 64
End: 2024-08-02
Attending: INTERNAL MEDICINE | Admitting: INTERNAL MEDICINE
Payer: COMMERCIAL

## 2024-08-02 ENCOUNTER — APPOINTMENT (OUTPATIENT)
Dept: CARDIOLOGY | Facility: MEDICAL CENTER | Age: 64
End: 2024-08-02
Attending: INTERNAL MEDICINE
Payer: COMMERCIAL

## 2024-08-02 VITALS
DIASTOLIC BLOOD PRESSURE: 71 MMHG | HEIGHT: 66 IN | TEMPERATURE: 97.7 F | BODY MASS INDEX: 39.9 KG/M2 | SYSTOLIC BLOOD PRESSURE: 112 MMHG | OXYGEN SATURATION: 96 % | HEART RATE: 57 BPM | WEIGHT: 248.24 LBS | RESPIRATION RATE: 20 BRPM

## 2024-08-02 DIAGNOSIS — I34.0 MODERATE MITRAL REGURGITATION: ICD-10-CM

## 2024-08-02 DIAGNOSIS — R94.39 ABNORMAL NUCLEAR STRESS TEST: ICD-10-CM

## 2024-08-02 DIAGNOSIS — I48.19 PERSISTENT ATRIAL FIBRILLATION (HCC): ICD-10-CM

## 2024-08-02 DIAGNOSIS — I50.32 CHRONIC HEART FAILURE WITH PRESERVED EJECTION FRACTION (HFPEF) (HCC): ICD-10-CM

## 2024-08-02 DIAGNOSIS — Z79.899 ON POTASSIUM WASTING DIURETIC THERAPY: ICD-10-CM

## 2024-08-02 LAB — EKG IMPRESSION: NORMAL

## 2024-08-02 PROCEDURE — 160035 HCHG PACU - 1ST 60 MINS PHASE I

## 2024-08-02 PROCEDURE — 700117 HCHG RX CONTRAST REV CODE 255: Performed by: INTERNAL MEDICINE

## 2024-08-02 PROCEDURE — 93010 ELECTROCARDIOGRAM REPORT: CPT | Mod: 59 | Performed by: INTERNAL MEDICINE

## 2024-08-02 PROCEDURE — 700111 HCHG RX REV CODE 636 W/ 250 OVERRIDE (IP): Mod: JZ

## 2024-08-02 PROCEDURE — 93454 CORONARY ARTERY ANGIO S&I: CPT | Mod: 26 | Performed by: INTERNAL MEDICINE

## 2024-08-02 PROCEDURE — 92960 CARDIOVERSION ELECTRIC EXT: CPT

## 2024-08-02 PROCEDURE — 92960 CARDIOVERSION ELECTRIC EXT: CPT | Mod: 53 | Performed by: INTERNAL MEDICINE

## 2024-08-02 PROCEDURE — 160002 HCHG RECOVERY MINUTES (STAT)

## 2024-08-02 PROCEDURE — 93325 DOPPLER ECHO COLOR FLOW MAPG: CPT

## 2024-08-02 PROCEDURE — 99221 1ST HOSP IP/OBS SF/LOW 40: CPT | Mod: 25 | Performed by: INTERNAL MEDICINE

## 2024-08-02 PROCEDURE — 99152 MOD SED SAME PHYS/QHP 5/>YRS: CPT | Performed by: INTERNAL MEDICINE

## 2024-08-02 PROCEDURE — 700102 HCHG RX REV CODE 250 W/ 637 OVERRIDE(OP): Performed by: INTERNAL MEDICINE

## 2024-08-02 PROCEDURE — 93005 ELECTROCARDIOGRAM TRACING: CPT | Performed by: INTERNAL MEDICINE

## 2024-08-02 PROCEDURE — 93325 DOPPLER ECHO COLOR FLOW MAPG: CPT | Mod: 26 | Performed by: INTERNAL MEDICINE

## 2024-08-02 PROCEDURE — C1769 GUIDE WIRE: HCPCS

## 2024-08-02 PROCEDURE — A9270 NON-COVERED ITEM OR SERVICE: HCPCS | Performed by: INTERNAL MEDICINE

## 2024-08-02 PROCEDURE — 93312 ECHO TRANSESOPHAGEAL: CPT | Mod: 26 | Performed by: INTERNAL MEDICINE

## 2024-08-02 PROCEDURE — 700101 HCHG RX REV CODE 250

## 2024-08-02 RX ORDER — MIDAZOLAM HYDROCHLORIDE 1 MG/ML
INJECTION INTRAMUSCULAR; INTRAVENOUS
Status: COMPLETED
Start: 2024-08-02 | End: 2024-08-02

## 2024-08-02 RX ORDER — FUROSEMIDE 20 MG
20 TABLET ORAL DAILY
Qty: 90 TABLET | Refills: 3 | Status: SHIPPED | OUTPATIENT
Start: 2024-08-02

## 2024-08-02 RX ORDER — POTASSIUM CHLORIDE 1500 MG/1
20 TABLET, EXTENDED RELEASE ORAL 2 TIMES DAILY
Qty: 180 TABLET | Refills: 3 | Status: SHIPPED | OUTPATIENT
Start: 2024-08-02

## 2024-08-02 RX ORDER — ASPIRIN 81 MG/1
324 TABLET, CHEWABLE ORAL ONCE
Status: COMPLETED | OUTPATIENT
Start: 2024-08-02 | End: 2024-08-02

## 2024-08-02 RX ORDER — HEPARIN SODIUM 200 [USP'U]/100ML
INJECTION, SOLUTION INTRAVENOUS
Status: COMPLETED
Start: 2024-08-02 | End: 2024-08-02

## 2024-08-02 RX ORDER — ASPIRIN 81 MG/1
324 TABLET, CHEWABLE ORAL DAILY
Status: DISCONTINUED | OUTPATIENT
Start: 2024-08-02 | End: 2024-08-02

## 2024-08-02 RX ORDER — LIDOCAINE HYDROCHLORIDE 20 MG/ML
INJECTION, SOLUTION INFILTRATION; PERINEURAL
Status: COMPLETED
Start: 2024-08-02 | End: 2024-08-02

## 2024-08-02 RX ORDER — HEPARIN SODIUM 1000 [USP'U]/ML
INJECTION, SOLUTION INTRAVENOUS; SUBCUTANEOUS
Status: COMPLETED
Start: 2024-08-02 | End: 2024-08-02

## 2024-08-02 RX ORDER — VERAPAMIL HYDROCHLORIDE 2.5 MG/ML
INJECTION, SOLUTION INTRAVENOUS
Status: COMPLETED
Start: 2024-08-02 | End: 2024-08-02

## 2024-08-02 RX ADMIN — LIDOCAINE HYDROCHLORIDE: 20 INJECTION, SOLUTION INFILTRATION; PERINEURAL at 08:38

## 2024-08-02 RX ADMIN — FENTANYL CITRATE 100 MCG: 50 INJECTION, SOLUTION INTRAMUSCULAR; INTRAVENOUS at 09:36

## 2024-08-02 RX ADMIN — HEPARIN SODIUM 2000 UNITS: 200 INJECTION, SOLUTION INTRAVENOUS at 08:38

## 2024-08-02 RX ADMIN — APIXABAN 5 MG: 5 TABLET, FILM COATED ORAL at 10:41

## 2024-08-02 RX ADMIN — IOHEXOL 30 ML: 350 INJECTION, SOLUTION INTRAVENOUS at 09:46

## 2024-08-02 RX ADMIN — VERAPAMIL HYDROCHLORIDE 2.5 MG: 2.5 INJECTION, SOLUTION INTRAVENOUS at 08:38

## 2024-08-02 RX ADMIN — HEPARIN SODIUM: 1000 INJECTION, SOLUTION INTRAVENOUS; SUBCUTANEOUS at 08:40

## 2024-08-02 RX ADMIN — MIDAZOLAM HYDROCHLORIDE 2 MG: 1 INJECTION, SOLUTION INTRAMUSCULAR; INTRAVENOUS at 08:59

## 2024-08-02 RX ADMIN — FENTANYL CITRATE 100 MCG: 50 INJECTION, SOLUTION INTRAMUSCULAR; INTRAVENOUS at 09:00

## 2024-08-02 RX ADMIN — MIDAZOLAM HYDROCHLORIDE 2 MG: 1 INJECTION, SOLUTION INTRAMUSCULAR; INTRAVENOUS at 09:09

## 2024-08-02 RX ADMIN — FENTANYL CITRATE 50 MCG: 50 INJECTION, SOLUTION INTRAMUSCULAR; INTRAVENOUS at 09:47

## 2024-08-02 RX ADMIN — NITROGLYCERIN 10 ML: 20 INJECTION INTRAVENOUS at 08:38

## 2024-08-02 RX ADMIN — ASPIRIN 324 MG: 81 TABLET, CHEWABLE ORAL at 07:41

## 2024-08-02 RX ADMIN — MIDAZOLAM HYDROCHLORIDE 2 MG: 1 INJECTION, SOLUTION INTRAMUSCULAR; INTRAVENOUS at 09:36

## 2024-08-02 RX ADMIN — MIDAZOLAM HYDROCHLORIDE 1 MG: 1 INJECTION, SOLUTION INTRAMUSCULAR; INTRAVENOUS at 09:47

## 2024-08-02 ASSESSMENT — FIBROSIS 4 INDEX: FIB4 SCORE: .8148148148148148148

## 2024-08-02 NOTE — H&P
CARDIOLOGY PREPROCEDURE H&P NOTE      HPI:  62-year-old female with history of class III obesity, hypertension, dyslipidemia, prior tobacco use, ocular migraines establish care in my clinic 6/28/24 after hospitalization in May 2024 for new diagnosis of atrial fibrillation and abnormal cardiac stress test.    Then, given her symptoms, we agreed to proceed with SHELLIE, possible cardioversion if remains in A-fib and coronary angiography/left heart catheterization with possible PCI.  Then, was also referred to EP, no appointment yet.    On apixaban.    ECG from 7/29 in A fib. Has appt with EP on 10/8 with Dr. Shetty.    Overall feeling better with lasix. Stopped 3 days ago. Apixaban stopped about 48 hrs ago    Past Medical History:   Diagnosis Date    Atrial fibrillation (HCC)     Breath shortness     Bronchitis     Diabetes (HCC)     Prediabetes    Hypertension     Myocardial infarct (HCC)     Pneumonia     Snoring        Social History     Socioeconomic History    Marital status: Single     Spouse name: Not on file    Number of children: Not on file    Years of education: Not on file    Highest education level: Not on file   Occupational History    Not on file   Tobacco Use    Smoking status: Every Day     Types: Cigarettes    Smokeless tobacco: Never    Tobacco comments:     about 2 per day   Vaping Use    Vaping status: Former    Substances: Nicotine, Flavoring    Devices: Refillable tank   Substance and Sexual Activity    Alcohol use: Yes     Alcohol/week: 6.0 oz     Types: 7 Cans of beer, 3 Shots of liquor per week    Drug use: Not Currently     Comment: denies    Sexual activity: Never     Birth control/protection: Post-Menopausal   Other Topics Concern     Service Not Asked    Blood Transfusions Not Asked    Caffeine Concern No    Occupational Exposure Not Asked    Hobby Hazards Not Asked    Sleep Concern Yes    Stress Concern Not Asked    Weight Concern Yes    Special Diet No    Back Care Not Asked     Exercise No    Bike Helmet Not Asked    Seat Belt Not Asked    Self-Exams Not Asked   Social History Narrative    Not on file     Social Determinants of Health     Financial Resource Strain: Not on file   Food Insecurity: Not on file   Transportation Needs: Not on file   Physical Activity: Not on file   Stress: Not on file   Social Connections: Not on file   Intimate Partner Violence: Not on file   Housing Stability: Not on file       No current facility-administered medications on file prior to encounter.     Current Outpatient Medications on File Prior to Encounter   Medication Sig Dispense Refill    furosemide (LASIX) 20 MG Tab Take 1 Tablet by mouth every day. (Patient taking differently: Take 20 mg by mouth every morning.) 30 Tablet 1    amLODIPine (NORVASC) 10 MG Tab TAKE 1 TABLET BY MOUTH DAILY (Patient taking differently: Take 10 mg by mouth every morning.) 90 Tablet 3    buPROPion (WELLBUTRIN XL) 300 MG XL tablet Take 1 Tablet by mouth every morning. 90 Tablet 3    atorvastatin (LIPITOR) 40 MG Tab Take 1 Tablet by mouth every evening for 360 days. 90 Tablet 3    metoprolol SR (TOPROL XL) 50 MG TABLET SR 24 HR Take 1 Tablet by mouth every day. (Patient taking differently: Take 50 mg by mouth every morning.) 90 Tablet 3    isosorbide mononitrate SR (IMDUR) 30 MG TABLET SR 24 HR Take 1 Tablet by mouth every morning. 90 Tablet 3    Cholecalciferol (VITAMIN D) 2000 UNIT Tab Take 4,000 Units by mouth every day.      Omega-3 1000 MG Cap Take 1,000 mg by mouth every day.      potassium chloride SA (KDUR) 20 MEQ Tab CR Take 1 Tablet by mouth 2 times a day. 30 Tablet 1       No current facility-administered medications for this encounter.     Last reviewed on 8/2/2024  6:22 AM by JACKI Vergara    Family History   Problem Relation Age of Onset    Diabetes Mother     Hypertension Father     Breast Cancer Maternal Aunt     Cancer Maternal Aunt         breast    Glaucoma Maternal Uncle     Breast  "Cancer Maternal Grandmother     Cancer Maternal Grandmother         breast    Heart Disease Paternal Grandmother        ROS: As per HPI all other systems reviewed and negative     Physical Exam   Blood pressure 139/85, pulse 70, temperature 36 °C (96.8 °F), temperature source Temporal, resp. rate 20, height 1.676 m (5' 6\"), weight 113 kg (248 lb 3.8 oz), SpO2 93%, not currently breastfeeding.    Constitutional:  Appears well  HENT: Normocephalic and atraumatic. No scleral icterus.   Cardiovascular: Normal rate. Exam reveals no gallop and no friction rub. No murmur heard. irregular  Pulmonary/Chest: CTAB   Abdominal: S/NT/ND BS+   Musculoskeletal:  Pulses present. No atrophy. Strength normal.  Extremities: Exhibits 1+ bilateral pitting edema. No clubbing or cyanosis.   Skin: Skin is warm and dry.   Neuro: Non-focal, CN 2-12 intact grossly    No intake or output data in the 24 hours ending 08/02/24 0706                                Imaging reviewed    Impressions:  Persistent A fib - symptomatic  PVCs  Abnormal cardiac stress test  Normal LVEF 5/2024  ASCVD risk factors question were    Recommendations:  - SHELLIE DCCV. EKG in PPU pre procedure today  - CA/LHC possible PCI  - ASA 324mg pre procedure    Further cardiac med recs pending above procedures.    Discussed risks, benefits, rationale, appropriateness and alternatives to coronary angiography with IV sedation in great detail with the patient.  Complications including but not limited to death, stroke, MI, urgent bypass surgery, contrast nephropathy, vascular complications, bleeding and infection were explained.  In addition, we discussed that 10% of patients will experience small to moderate bruising at the side of the arterial puncture.  Risks of major complications such as heart attack or stroke caused by the angiogram is less than 1%; the risk of death is approximately 1 in 1000.  The potential outcomes associated with the procedure (possible PCI, possible CABG, " possible medical Rx only) were also discussed at length.  Patient voices understanding and with shared decision making, is in agreement to proceed.     Please note that this dictation was created using voice recognition software. There may be errors I did not discover before finalizing the note.     Michi Henao MD, MPH [facility]  Interventional Cardiologist  [facility]   of Clinical Internal Medicine - [facility] Fernie MIRANDA

## 2024-08-02 NOTE — PROCEDURES
Procedure Performed: SHELLIE [facility]    Procedure physician: Michi Henao MD, MPH  Assistant: None    CONSENT: I have discussed the risks and benefits of electrical cardioversion as well as the procedure itself, rationale and appropriateness in detail with the patient today. Complications including but not limited to death, stroke, MI, ACLS, aspiration and complications related to anesthesia were explained to the patient. The potential outcomes associated with the procedure were also discussed at length. The patient agrees to proceed.    Pre-procedure diagnosis/Indication: Atrial fibrillation  Post-procedure diagnosis: Same    Procedure description: After confirmation of therapeutic anticoagulation and obtaining informed consent, the patient was sedated by myself, see cardiac cath procedure note for sedation time. Thrombus in the left atrial appendage was excluded with SHELLIE. I then delivered a synchronized 200 joule biphasic cardioversion pulse in the anterior-posterior vector which was unsuccessful despite 2 attempts. The patient awoke from sedation without complication.     Specimens: None  EBL: None  Complications: None    Impression  Unsuccessful cardioversion of atrial fibrillation without restoration of sinus rhythm despite 2 attempts.    Michi Henao MD, MPH [facility]  Interventional Cardiologist  [facility]

## 2024-08-02 NOTE — DISCHARGE INSTRUCTIONS
Resume lasix 20mg daily today  Start empagliflozin 10mg daily, if too expensive, let us know via a [employer] message to look at alternatives  We will facilitate clinic follow up    SHELLIE - TRANSESOPHAGEAL ECHOCARDIOGRAM  1. Don't drive or drink alcohol for 24 hours. The medication you received will make you too drowsy.  2. If you begin to vomit bloody material, or develop black or bloody stools, call your doctor as soon as possible.  3. If you have any neck, chest, abdominal pain or temp of 100 degrees, call your doctor.    You should call 911 if you develop problems with breathing or chest pain.    POST ANGIOGRAM  General Care Instructions  Maintain a bandage over the incision site for 24 hours.  It's normal to find a small bruise or dime-sized lump at the insertion site. This should disappear within a few weeks.  Do not apply lotions or powders to the site.  Do not immerse the catheter insertion site in water (bathtub/swimming) for five days. It is ok to shower 24 hours after the procedure.  You may resume your normal diet immediately; on the day of your procedure, drink 6-10 glasses of water to help flush the contrast liquid out of your system.  If the doctor inserted the catheter in your arm:  For 3 days, DO NOT lift anything heavier than 10 pounds (approximately a gallon of milk). DO NOT do any heavy pushing, pulling, or twisting.    Medications  If your current medications need to be changed, you will be provided with an updated list of your medications prior to discharge.  If you take warfarin (Coumadin), resume taking your usual dose the evening after the procedure.  DO NOT STOP taking prescribed blood thinning (anti-platelet) medications unless instructed by your cardiologist.  These medications include:  Aspirin, Clopidogrel (Plavix), Ticagrelor (Brilinta), or Prasugrel (Effient)   If you take one of the following anticoagulants, RESUME 24 HOURS after your procedure:  Apixiban (Eliquis), Rivaroxaban (Xarelto),  Dabigatran (Pradaxa), Edoxaban (Savaysa)  If you take metformin (Glucophage), RESUME 48 HOURS after your procedure.    When to call your healthcare provider  Call your cardiologist right away at [phone] if you have any of the following:   Problems/Concerns taking any of your prescribed heart medicines.   The insertion site has increasing pain, swelling, redness, bleeding, or drainage.   Your arm or leg below where the insertion site changes color, is cool, or is numb.   You have chest pain or shortness of breath that does not go away with rest.   Your pulse feels irregular -- very slow (less than 60 beats/minute) or very fast (over 100 beats/minute).   You have dizziness, fainting, or you are very tired.   You are coughing up blood or yellow or green mucus.   You have chills or a fever over 101°F (38.3°C).    If there is bleeding at the catheter insertion site, apply pressure for 10 minutes.  If bleeding persists, call 911, and continue to hold pressure until advanced medical support arrives.    If you smoke, quit!  If your doctor has been urging you to quit smoking, it's for good reasons. Smoking damages your heart, blood vessels, and lungs. The good news is that quitting can halt or even reverse the damage of smoking. To quit now:  Get medical help. Ask your doctor for advice on stop-smoking programs. Also ask about medications or nicotine replacement therapy products that may help you quit smoking.  Get support. Join a support group. Ask for help from your family and friends.  Don't give up. It often takes several tries to succeed in quitting smoking.  Avoid secondhand smoke. Ask family and friends not to smoke around you.     What to Expect Post Sedation    Rest and take it easy for the first 24 hours.  A responsible adult is recommended to remain with you during that time.  It is normal to feel sleepy.  We encourage you to not do anything that requires balance, judgment or coordination.    FOR 24 HOURS DO  NOT:  Drive, operate machinery or run household appliances.  Drink beer or alcoholic beverages.  Make important decisions or sign legal documents.    To avoid nausea, slowly advance diet as tolerated, avoiding spicy or greasy foods for the first day.  Add more substantial food to your diet according to your provider's instructions.  INCREASE FLUIDS AND FIBER TO AVOID CONSTIPATION.    MILD FLU-LIKE SYMPTOMS ARE NORMAL.  YOU MAY EXPERIENCE GENERALIZED MUSCLE ACHES, THROAT IRRITATION, HEADACHE AND/OR SOME NAUSEA.    If any questions arise, call your provider.  If your provider is not available, please feel free to call the [facility] at [phone].    MEDICATIONS: Resume taking daily medication.  Take prescribed pain medication with food.  If no medication is prescribed, you may take non-aspirin pain medication if needed.  PAIN MEDICATION CAN BE VERY CONSTIPATING.  Take a stool softener or laxative such as senokot, pericolace, or milk of magnesia if needed.

## 2024-08-02 NOTE — OR NURSING
0957 Patient arrived to PACU from cath lab.  Report from RN.  Patient is awake and alert, denies pain.  TR band to right wrist is clean, dry and soft, patient denies numbness or tingling to right hand.  Educated on wrist precautions.  Patient updated on plan of care.  1030 Dad called and updated on patient status.  1050 2 ml air removed from TR band, no bleeding to site.   1105 2 ml air removed from TR band, no bleeding to site.   1120 2 ml air removed from TR band, no bleeding to site.   1135 3 ml air removed from TR band, no bleeding to site.   1150 TR band removed, no bleeding, dressing placed.  1215 Patient up to edge of bed, denies discomfort.  Access site clean, dry and soft.  All lines and monitors discontinued.  1245 Patient taken down via wheelchair to meet mom and dad.  Reviewed discharge paperwork with pt and mom. Discussed diet, activity, medications, follow up care and worsening symptoms. No questions at this time.  Pt discharged home with mom and dad.

## 2024-08-02 NOTE — PROCEDURES
Cardiac Catheterization Laboratory Procedure Note    DATE: 8/2/2024    : Michi Henao MD, MPH    PROCEDURES PERFORMED:  Left heart catheterization  Coronary angiography  SHELLIE   DCCV  Moderate conscious sedation    INDICATIONS:  Abnormal cardiac stress test positive cardiac stress test    CONSENT:  The complete alternatives, risks, and benefits of the procedure were explained to the patient. Informed consent was obtained prior to the procedure.    MEDICATIONS:  Lidocaine  Fentanyl  Midazolam  Nitroglycerin  Verapamil  Heparin    MODERATE CONSCIOUS SEDATION:  I personally supervised the administration of moderate conscious sedation by the nursing staff for 49 minutes.  Start time: 8:55 AM  End time: 9:44 AM    CONTRAST: Omnipaque 30 cc    RADIATION DOSE (Air Kerma): 177 mGy    FLUOROSCOPY TIME: 3.5 minutes    ACCESS:  6-Guyanese Glidesheath in the right radial artery    ESTIMATED BLOOD LOSS: <10 cc    COMPLICATIONS: None    PROCEDURE IN DETAIL:  The patient was brought to the cardiac catheterization laboratory in the fasting state.     SHELLIE performed. See separate report.    The skin over the right wrist was prepped and draped in the usual sterile fashion. Lidocaine infiltration was used to anesthetize the tissue over the right radial artery. Using the micropuncture technique, a 6-Guyanese Glidesheath was inserted in the right radial artery. A 5-Fr Terumo Tiger diagnostic catheter was then advanced over a standard J-wire into the left ventricular cavity where it was gently aspirated, flushed, and then withdrawn across the aortic valve with sequential pressures measured. This catheter was then used to engage the ostium of the left coronary artery and cineangiograms were obtained in multiple projections for complete evaluation of the left coronary system. This catheter was exchanged over a J-wire to a 6 Guyanese JR4 diagnostic catheter that was used to engage the ostium of the right coronary artery and cineangiograms  were obtained in multiple projections for complete evaluation of the right coronary system. Following completion of coronary angiography, all wires, catheters, and sheaths were removed.  A TR band was placed over the right wrist using the patent hemostasis technique.     Then DCCV performed, see separate note.    HEMODYNAMICS:  Aortic pressure: 103 /83 mmHg  LVEDP: 24 mmHg  No significant aortic gradient on pullback    CORONARY ANGIOGRAPHY:  The left main coronary artery : Normal-appearing large caliber vessel that bifurcates LAD and left circumflex  The left anterior descending coronary artery : Large-caliber transapical vessel with slow coronary flow, improved with intracoronary coronary nitroglycerin.  Gives rise to a large diagonal branch  The left circumflex coronary artery : Large-caliber normal-appearing vessel with slow coronary flow, gives rise to a small high OM1 and large bifurcating OM 2  The right coronary artery  : Large-caliber dominant RCA, normal-appearing with slow coronary flow    IMPRESSION:  1.  Normal-appearing epicardial coronary arteries, dominant RCA with slow coronary flow phenomena suggestive of either underlying endothelial dysfunction or microvascular disease; with false positive recent cardiac stress test  2.  Elevated resting LVEDP 24 mmHg no significant transaortic gradient on pullback, consistent with underlying diagnosis chronic HFpEF  3.  See separate SHELLIE report  4.  See separate DCCV procedure note (failed DCCV x 2)    RECOMMENDATIONS:  TR band protocol  Resume Lasix 20 mg daily, start empagliflozin 10 mg daily  Has follow-up in my clinic next month  Messaged schedulers to arrange sooner EP follow-up to discuss rhythm control strategies for atrial fibrillation such as dofetilide, sotalol and A-fib ablation in the setting of HFpEF.  Preferable to avoid amiodarone given relatively young age and flecainide in the setting of heart failure.  Advised her to resume apixaban tonight.   Ordered 1 dose to be given ppu.  SHELLIE with moderate MR and dilated Ao 4.1cm. see separate report.  Ongoing optimal HFpEF and primary ASCVD prevention    NOTIFICATION:  The patient's mother was attempted to be called on several occasions on different phone numbers, kept referring to voicemail      Michi Henao MD, MPH [facility]  Interventional Cardiologist  [facility]   of Clinical Internal Medicine - [facility] [place] MIRANDA

## 2024-08-14 ENCOUNTER — OFFICE VISIT (OUTPATIENT)
Dept: MEDICAL GROUP | Facility: MEDICAL CENTER | Age: 64
End: 2024-08-14
Payer: COMMERCIAL

## 2024-08-14 VITALS
OXYGEN SATURATION: 96 % | HEART RATE: 65 BPM | HEIGHT: 66 IN | SYSTOLIC BLOOD PRESSURE: 124 MMHG | BODY MASS INDEX: 38.97 KG/M2 | TEMPERATURE: 97.7 F | DIASTOLIC BLOOD PRESSURE: 70 MMHG | WEIGHT: 242.51 LBS

## 2024-08-14 DIAGNOSIS — R73.03 PREDIABETES: Chronic | ICD-10-CM

## 2024-08-14 DIAGNOSIS — I48.91 ATRIAL FIBRILLATION, UNSPECIFIED TYPE (HCC): ICD-10-CM

## 2024-08-14 DIAGNOSIS — I50.32 CHRONIC HEART FAILURE WITH PRESERVED EJECTION FRACTION (HFPEF) (HCC): ICD-10-CM

## 2024-08-14 DIAGNOSIS — R40.0 DAYTIME SLEEPINESS: ICD-10-CM

## 2024-08-14 DIAGNOSIS — D72.829 LEUKOCYTOSIS, UNSPECIFIED TYPE: ICD-10-CM

## 2024-08-14 LAB
HBA1C MFR BLD: 6 % (ref ?–5.8)
POCT INT CON NEG: NEGATIVE
POCT INT CON POS: POSITIVE

## 2024-08-14 PROCEDURE — 3078F DIAST BP <80 MM HG: CPT | Performed by: BEHAVIOR ANALYST

## 2024-08-14 PROCEDURE — 83036 HEMOGLOBIN GLYCOSYLATED A1C: CPT | Performed by: BEHAVIOR ANALYST

## 2024-08-14 PROCEDURE — 3074F SYST BP LT 130 MM HG: CPT | Performed by: BEHAVIOR ANALYST

## 2024-08-14 PROCEDURE — 99214 OFFICE O/P EST MOD 30 MIN: CPT | Performed by: BEHAVIOR ANALYST

## 2024-08-14 ASSESSMENT — FIBROSIS 4 INDEX: FIB4 SCORE: .8148148148148148148

## 2024-08-14 ASSESSMENT — ENCOUNTER SYMPTOMS: SHORTNESS OF BREATH: 0

## 2024-08-14 NOTE — PROGRESS NOTES
Subjective:   Verbal consent was acquired by the patient to use [employer] ambient listening note generation during this visit Yes    CC:    Chief Complaint   Patient presents with    Follow-Up     Chronic conditions           HISTORY OF THE PRESENT ILLNESS:   Gladis presents today   History of Present Illness  The patient is a 64-year-old female established patient here for follow-up.    She has undergone a cardiac angiogram, SHELLIE, and cardioversion under the care of cardiology the shock treatment was unsuccessful.  She is worried about her mitral valve she is told it was more leaky than they thought.  She is currently on Lasix and Eliquis.   she is tolerating amlodipine and Wellbutrin well.She is curious if her elevated white blood cell count could be related to her heart condition, atrial fibrillation, and leaky valve.    Initially, she experienced sickness with Jardiance when taken in conjunction with her other morning medications. However, after shifting its intake to nighttime, she reports feeling much better.     Her sleep study appointment scheduled for 09/01/2024 was cancelled and needs to be rescheduled. She has already had an evaluation for the sleep study but has not yet undergone the study itself.     Her daily regimen includes vitamin D. She has not changed dietary modifications or reducing carbohydrate intake. She occasionally drinks homemade lemonade with sugar but does not consume sodas. She has tried Stevia as a sugar substitute..    She reports no symptoms of a cold.      Current Outpatient Medications   Medication Sig    Empagliflozin (JARDIANCE) 10 MG Tab tablet Take 1 Tablet by mouth every day.    furosemide (LASIX) 20 MG Tab Take 1 Tablet by mouth every day.    potassium chloride SA (KDUR) 20 MEQ Tab CR Take 1 Tablet by mouth 2 times a day.    ELIQUIS 5 MG Tab TAKE 1 TABLET BY MOUTH TWICE  DAILY    amLODIPine (NORVASC) 10 MG Tab TAKE 1 TABLET BY MOUTH DAILY (Patient taking differently: Take  "10 mg by mouth every morning.)    buPROPion (WELLBUTRIN XL) 300 MG XL tablet Take 1 Tablet by mouth every morning.    atorvastatin (LIPITOR) 40 MG Tab Take 1 Tablet by mouth every evening for 360 days.    metoprolol SR (TOPROL XL) 50 MG TABLET SR 24 HR Take 1 Tablet by mouth every day. (Patient taking differently: Take 50 mg by mouth every morning.)    isosorbide mononitrate SR (IMDUR) 30 MG TABLET SR 24 HR Take 1 Tablet by mouth every morning.    Cholecalciferol (VITAMIN D) 2000 UNIT Tab Take 4,000 Units by mouth every day.    Omega-3 1000 MG Cap Take 1,000 mg by mouth every day.          ROS: See HPI  Review of Systems   Constitutional:  Negative for malaise/fatigue.   Respiratory:  Negative for shortness of breath.    Cardiovascular:  Negative for chest pain.         Objective:     Exam: /70 (BP Location: Left arm, Patient Position: Sitting, BP Cuff Size: Adult)   Pulse 65   Temp 36.5 °C (97.7 °F) (Temporal)   Ht 1.676 m (5' 6\")   Wt 110 kg (242 lb 8.1 oz)   SpO2 96%   BMI 39.14 kg/m²   Body mass index is 39.14 kg/m².    Physical Exam  Constitutional:       Appearance: Normal appearance.   HENT:      Head: Normocephalic and atraumatic.   Cardiovascular:      Pulses: Normal pulses.   Pulmonary:      Effort: Pulmonary effort is normal.   Musculoskeletal:      Cervical back: Normal range of motion.   Neurological:      General: No focal deficit present.      Mental Status: She is alert.         Results  Laboratory Studies  A1c is 6. White blood cells were elevated.     Lab Results   Component Value Date/Time    HBA1C 6.0 (A) 08/14/2024 1359    AVGLUC 134 06/26/2023 0835         Assessment & Plan:     64 y.o. female with the following -     Assessment & Plan      1. Prediabetes  -Chronic, stable.  We discussed dietary modifications.  Her A1c levels have remained stable, not exceeding the prediabetic range. However, it would be beneficial to further reduce these levels to below 5.6. She was advised to " avoid sugary beverages and opt for water as her primary drink. Stevia was suggested as a healthier alternative to sugar.   - POCT  A1C    2. Chronic heart failure with preserved ejection fraction (HFpEF) (HCC)  -Continue management with cardiology    3. Atrial fibrillation, unspecified type (HCC)  -Continue management with cardiology.  She wanted to put off the sleep study, however, educated patient regarding importance of treating sleep apnea to improve chance of getting cardiac problems including atrial fibrillation under control.  Patient will reschedule appointment.  Continue management with cardiology    4. Daytime sleepiness  -Chronic, uncontrolled, most likely sleep apnea.  Patient will call to reschedule her sleep study.    5. Leukocytosis, unspecified type  -Chronic, worsening.  Needs CBC with differential  High white blood cell count on recent labs from July 2024.  Unknown etiology.  Asymptomatic.   A repeat CBC with differential will be ordered in 2 weeks to monitor her white blood cell count.  - CBC WITH DIFFERENTIAL; Future          Return in about 6 months (around 2/14/2025) for Annual preventative.  With Pap smear    Please note that this dictation was created using voice recognition software. I have made every reasonable attempt to correct obvious errors, but I expect that there are errors of grammar and possibly content that I did not discover before finalizing the note.

## 2024-08-19 ENCOUNTER — OFFICE VISIT (OUTPATIENT)
Dept: CARDIOLOGY | Facility: MEDICAL CENTER | Age: 64
End: 2024-08-19
Attending: INTERNAL MEDICINE
Payer: COMMERCIAL

## 2024-08-19 VITALS
HEART RATE: 80 BPM | SYSTOLIC BLOOD PRESSURE: 110 MMHG | RESPIRATION RATE: 14 BRPM | DIASTOLIC BLOOD PRESSURE: 76 MMHG | OXYGEN SATURATION: 95 % | BODY MASS INDEX: 39.7 KG/M2 | WEIGHT: 247 LBS | HEIGHT: 66 IN

## 2024-08-19 DIAGNOSIS — I48.19 PERSISTENT ATRIAL FIBRILLATION (HCC): ICD-10-CM

## 2024-08-19 LAB — EKG IMPRESSION: NORMAL

## 2024-08-19 PROCEDURE — 93005 ELECTROCARDIOGRAM TRACING: CPT | Performed by: NURSE PRACTITIONER

## 2024-08-19 PROCEDURE — 99214 OFFICE O/P EST MOD 30 MIN: CPT | Performed by: NURSE PRACTITIONER

## 2024-08-19 PROCEDURE — 99213 OFFICE O/P EST LOW 20 MIN: CPT | Performed by: NURSE PRACTITIONER

## 2024-08-19 PROCEDURE — 3074F SYST BP LT 130 MM HG: CPT | Performed by: NURSE PRACTITIONER

## 2024-08-19 PROCEDURE — 3078F DIAST BP <80 MM HG: CPT | Performed by: NURSE PRACTITIONER

## 2024-08-19 PROCEDURE — 93010 ELECTROCARDIOGRAM REPORT: CPT | Performed by: STUDENT IN AN ORGANIZED HEALTH CARE EDUCATION/TRAINING PROGRAM

## 2024-08-19 ASSESSMENT — FIBROSIS 4 INDEX: FIB4 SCORE: .8148148148148148148

## 2024-08-20 ASSESSMENT — ENCOUNTER SYMPTOMS
PND: 0
PALPITATIONS: 1
EYES NEGATIVE: 1
DEPRESSION: 0
SHORTNESS OF BREATH: 1
GASTROINTESTINAL NEGATIVE: 1
NAUSEA: 0
SENSORY CHANGE: 0
MUSCULOSKELETAL NEGATIVE: 1
CLAUDICATION: 0
ORTHOPNEA: 0
NERVOUS/ANXIOUS: 0
BRUISES/BLEEDS EASILY: 0
WHEEZING: 0
HALLUCINATIONS: 0
DIZZINESS: 1

## 2024-08-20 NOTE — PROGRESS NOTES
Atrial Fibrillation Clinic New Consult Note    DOS: 8/19/2024  [id number]  Gladis Ball    Chief complaint/Reason for consult: atrial fibrillation     HPI: Pt is a 64 y.o. female who presents to the clinic today in consultation for atrial fibrillation. Patient has a past medical history significant for but not limited to: persistent atrial fibrillation, h/o SHELLIE cardioversion, hypertension, moderate mitral regurgitation, polycythemia, chronic heart failure with preserved EF. Patient recently underwent angiogram with Dr. Porter after abnormal stress test. No severe obstructive coronary disease found. Also during same hospital stay was SHELLIE cardioverted unfortunately she reverted back to AF fairly quickly. When rate controlled she does ok but can get very symptomatic easily. Has some moderate mitral regurgitation which could be worsening due to persistence of atrial fibrillation but cori need to be attended to as well for long term optimization. She would like o pursue aggressive treatment for her atrial fibrillation because of her symptomatic nature.    The risks, benefits,and alternatives to Atrial Fibrillation ablation with general anesthesia were discussed in great detail. Catheter ablation is associated with less AF recurrence compared to antiarrhythmic medications per several studies (OMER, EARLY-AF, STOP-AF).We discussed pulmonary vein isolation for therapeutic management and continued rhythm control. We discussed the risks and benefits of this procedure, with risks to include but not limited to: Risks include 1-3% risk of major cardiovascular event including stroke, myocardial infarction, phrenic nerve damage, esophageal injury and/or fistula formation, cardiac perforation, pericardial effusion, tamponade, major bleeding, or death.  Also mentioned was a 1/400 (0.25%) occurrence of atrial esophageal fistula, which is an abnormal communication between the esophagus and the left atrium; this complication is  often fatal. Lastly the risks of death, myocardial infarction, stroke, DVT and PE were discussed. The patient verbalized understanding of these potential complications and wishes to proceed with this procedure.I quoted a 70 to 80% chance free of atrial fibrillation at 12 months.  We discussed that he may also need a second procedure. The patients [id number] score is 3. HAS-BLED score is 2              Past Medical History:   Diagnosis Date    Atrial fibrillation (HCC)     Breath shortness     Bronchitis     Diabetes (HCC)     Prediabetes    Hypertension     Myocardial infarct (HCC)     Pneumonia     Snoring        Past Surgical History:   Procedure Laterality Date    APPENDECTOMY      BREAST BIOPSY Left     2 biopsies    TUBAL LIGATION         Social History     Socioeconomic History    Marital status: Single     Spouse name: Not on file    Number of children: Not on file    Years of education: Not on file    Highest education level: Not on file   Occupational History    Not on file   Tobacco Use    Smoking status: Every Day     Types: Cigarettes    Smokeless tobacco: Never    Tobacco comments:     about 2 per day   Vaping Use    Vaping status: Former    Substances: Nicotine, Flavoring    Devices: Refillable tank   Substance and Sexual Activity    Alcohol use: Yes     Alcohol/week: 6.0 oz     Types: 7 Cans of beer, 3 Shots of liquor per week    Drug use: Not Currently     Comment: denies    Sexual activity: Never     Birth control/protection: Post-Menopausal   Other Topics Concern     Service Not Asked    Blood Transfusions Not Asked    Caffeine Concern No    Occupational Exposure Not Asked    Hobby Hazards Not Asked    Sleep Concern Yes    Stress Concern Not Asked    Weight Concern Yes    Special Diet No    Back Care Not Asked    Exercise No    Bike Helmet Not Asked    Seat Belt Not Asked    Self-Exams Not Asked   Social History Narrative    Not on file     Social Determinants of Health     Financial  Resource Strain: Not on file   Food Insecurity: Not on file   Transportation Needs: Not on file   Physical Activity: Not on file   Stress: Not on file   Social Connections: Not on file   Intimate Partner Violence: Not on file   Housing Stability: Not on file       Family History   Problem Relation Age of Onset    Diabetes Mother     Hypertension Father     Breast Cancer Maternal Aunt     Cancer Maternal Aunt         breast    Glaucoma Maternal Uncle     Breast Cancer Maternal Grandmother     Cancer Maternal Grandmother         breast    Heart Disease Paternal Grandmother        Allergies   Allergen Reactions    Codeine Itching     All over body , no rash       Current Outpatient Medications   Medication Sig Dispense Refill    Empagliflozin (JARDIANCE) 10 MG Tab tablet Take 1 Tablet by mouth every day. 90 Tablet 3    furosemide (LASIX) 20 MG Tab Take 1 Tablet by mouth every day. 90 Tablet 3    potassium chloride SA (KDUR) 20 MEQ Tab CR Take 1 Tablet by mouth 2 times a day. 180 Tablet 3    ELIQUIS 5 MG Tab TAKE 1 TABLET BY MOUTH TWICE  DAILY 180 Tablet 3    amLODIPine (NORVASC) 10 MG Tab TAKE 1 TABLET BY MOUTH DAILY (Patient taking differently: Take 10 mg by mouth every morning.) 90 Tablet 3    buPROPion (WELLBUTRIN XL) 300 MG XL tablet Take 1 Tablet by mouth every morning. 90 Tablet 3    atorvastatin (LIPITOR) 40 MG Tab Take 1 Tablet by mouth every evening for 360 days. 90 Tablet 3    metoprolol SR (TOPROL XL) 50 MG TABLET SR 24 HR Take 1 Tablet by mouth every day. (Patient taking differently: Take 50 mg by mouth every morning.) 90 Tablet 3    isosorbide mononitrate SR (IMDUR) 30 MG TABLET SR 24 HR Take 1 Tablet by mouth every morning. 90 Tablet 3    Cholecalciferol (VITAMIN D) 2000 UNIT Tab Take 4,000 Units by mouth every day.      Omega-3 1000 MG Cap Take 1,000 mg by mouth every day.       No current facility-administered medications for this visit.       Vitals:    08/19/24 1538   BP: 110/76   Pulse: 80   Resp: 14    SpO2: 95%         Review of Systems   Constitutional:  Positive for malaise/fatigue.   HENT: Negative.     Eyes: Negative.    Respiratory:  Positive for shortness of breath. Negative for wheezing.    Cardiovascular:  Positive for palpitations. Negative for chest pain, orthopnea, claudication, leg swelling and PND.   Gastrointestinal: Negative.  Negative for nausea.   Genitourinary: Negative.    Musculoskeletal: Negative.    Skin: Negative.    Neurological:  Positive for dizziness. Negative for sensory change.   Endo/Heme/Allergies: Negative.  Does not bruise/bleed easily.   Psychiatric/Behavioral:  Negative for depression and hallucinations. The patient is not nervous/anxious.         Prior echo results reviewed: LVEF 55-60%; bi atrial dilation; moderate mitral regurgitation     Prior cath/stress results reviewed:   The left main coronary artery : Normal-appearing large caliber vessel that bifurcates LAD and left circumflex  The left anterior descending coronary artery : Large-caliber transapical vessel with slow coronary flow, improved with intracoronary coronary nitroglycerin.  Gives rise to a large diagonal branch  The left circumflex coronary artery : Large-caliber normal-appearing vessel with slow coronary flow, gives rise to a small high OM1 and large bifurcating OM 2  The right coronary artery  : Large-caliber dominant RCA, normal-appearing with slow coronary flow    EKG interpreted by me: AF     Physical Exam  Constitutional:       Appearance: Normal appearance.   HENT:      Head: Normocephalic.   Eyes:      Pupils: Pupils are equal, round, and reactive to light.   Neck:      Vascular: No JVD.   Cardiovascular:      Rate and Rhythm: Normal rate. Rhythm irregular.      Pulses: Normal pulses.      Heart sounds: Normal heart sounds.   Pulmonary:      Effort: Pulmonary effort is normal.      Breath sounds: Normal breath sounds.   Abdominal:      General: Abdomen is flat.      Palpations: Abdomen is soft.  "  Musculoskeletal:      Cervical back: Normal range of motion.      Right lower leg: No edema.      Left lower leg: No edema.   Skin:     General: Skin is warm and dry.   Neurological:      Mental Status: She is alert and oriented to person, place, and time.   Psychiatric:         Mood and Affect: Mood normal.         Behavior: Behavior normal.          Data:  Lipids:   Lab Results   Component Value Date/Time    CHOLSTRLTOT 156 05/22/2024 06:07 AM    TRIGLYCERIDE 139 05/22/2024 06:07 AM    HDL 39 (A) 05/22/2024 06:07 AM    LDL 89 05/22/2024 06:07 AM        BMP:  Lab Results   Component Value Date/Time    SODIUM 141 07/29/2024 1540    POTASSIUM 4.2 07/29/2024 1540    CHLORIDE 106 07/29/2024 1540    CO2 21 07/29/2024 1540    GLUCOSE 101 (H) 07/29/2024 1540    BUN 16 07/29/2024 1540    CREATININE 1.16 07/29/2024 1540    CALCIUM 9.7 07/29/2024 1540    ANION 14.0 07/29/2024 1540       GFR:  Lab Results   Component Value Date/Time    IFAFRICA >60 01/30/2020 0831    IFNOTAFR >60 01/30/2020 0831        TSH:   Lab Results   Component Value Date/Time    TSHULTRASEN 1.100 05/21/2024 1335       MAGNESIUM:  Lab Results   Component Value Date/Time    MAGNESIUM 2.2 05/22/2024 0607        THYROXINE (T4):   No results found for: \"FREEDIR\"     CBC:   Lab Results   Component Value Date/Time    WBC 16.3 (H) 07/29/2024 03:40 PM    RBC 4.58 07/29/2024 03:40 PM    HEMOGLOBIN 15.4 07/29/2024 03:40 PM    HEMATOCRIT 45.8 07/29/2024 03:40 PM    .0 (H) 07/29/2024 03:40 PM    MCH 33.6 (H) 07/29/2024 03:40 PM    MCHC 33.6 07/29/2024 03:40 PM    RDW 47.8 07/29/2024 03:40 PM    PLATELETCT 279 07/29/2024 03:40 PM    MPV 9.7 07/29/2024 03:40 PM    NEUTSPOLYS 66.90 05/21/2024 01:35 PM    LYMPHOCYTES 21.90 (L) 05/21/2024 01:35 PM    MONOCYTES 9.60 05/21/2024 01:35 PM    EOSINOPHILS 0.60 05/21/2024 01:35 PM    BASOPHILS 0.60 05/21/2024 01:35 PM    IMMGRAN 0.40 05/21/2024 01:35 PM    NRBC 0.00 05/21/2024 01:35 PM    NEUTS 9.62 (H) 05/21/2024 " "01:35 PM    LYMPHS 3.14 05/21/2024 01:35 PM    MONOS 1.38 (H) 05/21/2024 01:35 PM    EOS 0.09 05/21/2024 01:35 PM    BASO 0.08 05/21/2024 01:35 PM    IMMGRANAB 0.06 05/21/2024 01:35 PM    NRBCAB 0.00 05/21/2024 01:35 PM        CBC w/o DIFF  Lab Results   Component Value Date/Time    WBC 16.3 (H) 07/29/2024 03:40 PM    RBC 4.58 07/29/2024 03:40 PM    HEMOGLOBIN 15.4 07/29/2024 03:40 PM    .0 (H) 07/29/2024 03:40 PM    MCH 33.6 (H) 07/29/2024 03:40 PM    MCHC 33.6 07/29/2024 03:40 PM    RDW 47.8 07/29/2024 03:40 PM    MPV 9.7 07/29/2024 03:40 PM       LIVER:  Lab Results   Component Value Date/Time    ALKPHOSPHAT 79 07/29/2024 03:40 PM    ASTSGOT 11 (L) 07/29/2024 03:40 PM    ALTSGPT 9 07/29/2024 03:40 PM    TBILIRUBIN 0.4 07/29/2024 03:40 PM       BNP:  No results found for: \"BNPBTYPENAT\"    PT/INR:  Lab Results   Component Value Date/Time    PROTHROMBTM 15.1 (H) 07/29/2024 03:40 PM    INR 1.18 (H) 07/29/2024 03:40 PM             Impression/Plan:  AF (atrial fibrillation) (HCC)   - continue metoprolol for rate control   - Eliquis for stroke protection   - pursue ablation treatment    - lifestyle modifiers discussed as well        Lifestyle Modification for better heart health care as well as beneficial for prevention of worsening the atrial arrhythmia progression. Lifestyle modification discussed includes diet and reduction of sodium. Patients should eat more of a Mediterranean diet and be very careful with how much processed and fast food they eat. Excessive sodium intake can result in fluid retention which can add additional strain to patients cardiac electrical system. Weight loss can also help long term with cardiac health. For patients with BMI above 25kg/m2, studies have shown a greater chance of progression of disease as well as recurrence after interventions. ARREST-AF study showed less recurrence of AF and slower disease progression in patients with BMI below 27kg/m2. Smoking and vaping should be " stopped. Moderation on caffeine and alcohol. Excessive alcohol or caffeine can result in reactions and antagonize the hearts electrical system. Patient that fit the matrix for sleep apnea should be referred for a formal study and should try to be compliant with treatment for sleep apnea. Stay hydrated and stay active. Studies have shown that staying physically active can help heart health. The CARDIO-FIT study showed sedentary individuals lead to faster time of recurrence of arrhythmia. Exercise goals should be trying to maintain 120-200 minutes per week of exercise.      A total of 34 minutes of time was spent on day of encounter reviewing medical record, performing history and examination, counseling, ordering medication/test/consults, collaborating with referring service, and documentation.    Gustavo Farfan AGACNP-EP  Cardiac Electrophysiology

## 2024-08-20 NOTE — ASSESSMENT & PLAN NOTE
- continue metoprolol for rate control   - Eliquis for stroke protection   - pursue ablation treatment    - lifestyle modifiers discussed as well

## 2024-08-22 ENCOUNTER — TELEPHONE (OUTPATIENT)
Dept: CARDIOLOGY | Facility: MEDICAL CENTER | Age: 64
End: 2024-08-22
Payer: COMMERCIAL

## 2024-08-22 NOTE — TELEPHONE ENCOUNTER
----- Message from Nurse Practitioner SHA Corral sent at 8/22/2024  7:51 AM PDT -----  Regarding: FW: ablation review  Orders in  ----- Message -----  From: Xin Huynh M.D.  Sent: 8/21/2024   5:19 PM PDT  To: SHA Marie  Subject: RE: ablation review                              Sure schedule w PFA  ----- Message -----  From: SHA Marie  Sent: 8/20/2024   7:47 AM PDT  To: Xin Huynh M.D.  Subject: ablation review                                  Review for ablation please

## 2024-08-22 NOTE — TELEPHONE ENCOUNTER
Gustavo,    Are there any medications this patient will need to hold for this procedure?     Thank You,  Shavonne

## 2024-08-22 NOTE — TELEPHONE ENCOUNTER
Patient scheduled for afib ablation w/SHELLIE on 12-19-24 with Dr. Huynh. Patient has been instructed to check in at 5:30 for 7:30 case time. Hold Jardiance 4 days. Message sent to authorizations. Emailed Allyssa and VENANCIO

## 2024-09-11 ENCOUNTER — HOSPITAL ENCOUNTER (OUTPATIENT)
Dept: LAB | Facility: MEDICAL CENTER | Age: 64
End: 2024-09-11
Attending: BEHAVIOR ANALYST
Payer: COMMERCIAL

## 2024-09-11 DIAGNOSIS — D72.829 LEUKOCYTOSIS, UNSPECIFIED TYPE: ICD-10-CM

## 2024-09-11 DIAGNOSIS — R79.89 LOW VITAMIN D LEVEL: ICD-10-CM

## 2024-09-11 DIAGNOSIS — R73.03 PREDIABETES: Chronic | ICD-10-CM

## 2024-09-11 DIAGNOSIS — E78.00 ELEVATED LDL CHOLESTEROL LEVEL: Chronic | ICD-10-CM

## 2024-09-11 DIAGNOSIS — D75.1 POLYCYTHEMIA: ICD-10-CM

## 2024-09-11 LAB
25(OH)D3 SERPL-MCNC: 50 NG/ML (ref 30–100)
ALBUMIN SERPL BCP-MCNC: 3.9 G/DL (ref 3.2–4.9)
ALP SERPL-CCNC: 83 U/L (ref 30–99)
ALT SERPL-CCNC: 24 U/L (ref 2–50)
AST SERPL-CCNC: 21 U/L (ref 12–45)
BASOPHILS # BLD AUTO: 0.8 % (ref 0–1.8)
BASOPHILS # BLD: 0.11 K/UL (ref 0–0.12)
BILIRUB CONJ SERPL-MCNC: <0.2 MG/DL (ref 0.1–0.5)
BILIRUB INDIRECT SERPL-MCNC: NORMAL MG/DL (ref 0–1)
BILIRUB SERPL-MCNC: 0.6 MG/DL (ref 0.1–1.5)
CHOLEST SERPL-MCNC: 115 MG/DL (ref 100–199)
EOSINOPHIL # BLD AUTO: 0.16 K/UL (ref 0–0.51)
EOSINOPHIL NFR BLD: 1.2 % (ref 0–6.9)
ERYTHROCYTE [DISTWIDTH] IN BLOOD BY AUTOMATED COUNT: 48.2 FL (ref 35.9–50)
EST. AVERAGE GLUCOSE BLD GHB EST-MCNC: 131 MG/DL
FASTING STATUS PATIENT QL REPORTED: NORMAL
HBA1C MFR BLD: 6.2 % (ref 4–5.6)
HCT VFR BLD AUTO: 51.7 % (ref 37–47)
HDLC SERPL-MCNC: 48 MG/DL
HGB BLD-MCNC: 16.7 G/DL (ref 12–16)
IMM GRANULOCYTES # BLD AUTO: 0.06 K/UL (ref 0–0.11)
IMM GRANULOCYTES NFR BLD AUTO: 0.5 % (ref 0–0.9)
LDLC SERPL CALC-MCNC: 41 MG/DL
LYMPHOCYTES # BLD AUTO: 2.54 K/UL (ref 1–4.8)
LYMPHOCYTES NFR BLD: 19.2 % (ref 22–41)
MCH RBC QN AUTO: 32.4 PG (ref 27–33)
MCHC RBC AUTO-ENTMCNC: 32.3 G/DL (ref 32.2–35.5)
MCV RBC AUTO: 100.2 FL (ref 81.4–97.8)
MONOCYTES # BLD AUTO: 0.89 K/UL (ref 0–0.85)
MONOCYTES NFR BLD AUTO: 6.7 % (ref 0–13.4)
NEUTROPHILS # BLD AUTO: 9.45 K/UL (ref 1.82–7.42)
NEUTROPHILS NFR BLD: 71.6 % (ref 44–72)
NRBC # BLD AUTO: 0 K/UL
NRBC BLD-RTO: 0 /100 WBC (ref 0–0.2)
PLATELET # BLD AUTO: 311 K/UL (ref 164–446)
PMV BLD AUTO: 9.4 FL (ref 9–12.9)
PROT SERPL-MCNC: 7.6 G/DL (ref 6–8.2)
RBC # BLD AUTO: 5.16 M/UL (ref 4.2–5.4)
TRIGL SERPL-MCNC: 131 MG/DL (ref 0–149)
WBC # BLD AUTO: 13.2 K/UL (ref 4.8–10.8)

## 2024-09-11 PROCEDURE — 83036 HEMOGLOBIN GLYCOSYLATED A1C: CPT

## 2024-09-11 PROCEDURE — 80061 LIPID PANEL: CPT

## 2024-09-11 PROCEDURE — 82306 VITAMIN D 25 HYDROXY: CPT

## 2024-09-11 PROCEDURE — 36415 COLL VENOUS BLD VENIPUNCTURE: CPT

## 2024-09-11 PROCEDURE — 85025 COMPLETE CBC W/AUTO DIFF WBC: CPT

## 2024-09-11 PROCEDURE — 80076 HEPATIC FUNCTION PANEL: CPT

## 2024-09-12 DIAGNOSIS — D75.1 POLYCYTHEMIA: ICD-10-CM

## 2024-09-12 DIAGNOSIS — D72.829 LEUKOCYTOSIS, UNSPECIFIED TYPE: ICD-10-CM

## 2024-10-08 ENCOUNTER — OFFICE VISIT (OUTPATIENT)
Dept: CARDIOLOGY | Facility: MEDICAL CENTER | Age: 64
End: 2024-10-08
Attending: INTERNAL MEDICINE
Payer: COMMERCIAL

## 2024-10-08 VITALS
BODY MASS INDEX: 39.53 KG/M2 | SYSTOLIC BLOOD PRESSURE: 114 MMHG | RESPIRATION RATE: 16 BRPM | OXYGEN SATURATION: 95 % | HEART RATE: 60 BPM | WEIGHT: 246 LBS | DIASTOLIC BLOOD PRESSURE: 78 MMHG | HEIGHT: 66 IN

## 2024-10-08 DIAGNOSIS — I50.32 CHRONIC HEART FAILURE WITH PRESERVED EJECTION FRACTION (HFPEF) (HCC): ICD-10-CM

## 2024-10-08 DIAGNOSIS — I48.19 PERSISTENT ATRIAL FIBRILLATION (HCC): Primary | ICD-10-CM

## 2024-10-08 DIAGNOSIS — I34.0 MODERATE MITRAL REGURGITATION: ICD-10-CM

## 2024-10-08 DIAGNOSIS — Z79.01 CHRONIC ANTICOAGULATION: ICD-10-CM

## 2024-10-08 DIAGNOSIS — Z79.899 HIGH RISK MEDICATION USE: ICD-10-CM

## 2024-10-08 PROCEDURE — 99213 OFFICE O/P EST LOW 20 MIN: CPT | Performed by: INTERNAL MEDICINE

## 2024-10-08 PROCEDURE — G2211 COMPLEX E/M VISIT ADD ON: HCPCS | Performed by: INTERNAL MEDICINE

## 2024-10-08 PROCEDURE — 99214 OFFICE O/P EST MOD 30 MIN: CPT | Performed by: INTERNAL MEDICINE

## 2024-10-08 RX ORDER — AMIODARONE HYDROCHLORIDE 200 MG/1
200 TABLET ORAL DAILY
Qty: 90 TABLET | Refills: 1 | Status: SHIPPED | OUTPATIENT
Start: 2024-10-08

## 2024-10-08 ASSESSMENT — FIBROSIS 4 INDEX: FIB4 SCORE: 0.85

## 2024-10-17 ENCOUNTER — TELEPHONE (OUTPATIENT)
Dept: CARDIOLOGY | Facility: MEDICAL CENTER | Age: 64
End: 2024-10-17

## 2024-10-17 ENCOUNTER — NON-PROVIDER VISIT (OUTPATIENT)
Dept: CARDIOLOGY | Facility: MEDICAL CENTER | Age: 64
End: 2024-10-17
Attending: INTERNAL MEDICINE
Payer: COMMERCIAL

## 2024-10-17 DIAGNOSIS — I48.19 PERSISTENT ATRIAL FIBRILLATION (HCC): ICD-10-CM

## 2024-10-17 LAB — EKG IMPRESSION: NORMAL

## 2024-10-17 PROCEDURE — 93010 ELECTROCARDIOGRAM REPORT: CPT | Performed by: INTERNAL MEDICINE

## 2024-10-17 PROCEDURE — 93005 ELECTROCARDIOGRAM TRACING: CPT

## 2024-10-18 ENCOUNTER — APPOINTMENT (OUTPATIENT)
Dept: SLEEP MEDICINE | Facility: MEDICAL CENTER | Age: 64
End: 2024-10-18
Attending: INTERNAL MEDICINE
Payer: COMMERCIAL

## 2024-10-22 ENCOUNTER — APPOINTMENT (OUTPATIENT)
Dept: ADMISSIONS | Facility: MEDICAL CENTER | Age: 64
End: 2024-10-22
Attending: INTERNAL MEDICINE
Payer: COMMERCIAL

## 2024-10-29 ENCOUNTER — PRE-ADMISSION TESTING (OUTPATIENT)
Dept: ADMISSIONS | Facility: MEDICAL CENTER | Age: 64
End: 2024-10-29
Attending: INTERNAL MEDICINE
Payer: COMMERCIAL

## 2024-10-30 ENCOUNTER — HOSPITAL ENCOUNTER (OUTPATIENT)
Facility: MEDICAL CENTER | Age: 64
End: 2024-10-30
Attending: INTERNAL MEDICINE | Admitting: INTERNAL MEDICINE
Payer: COMMERCIAL

## 2024-10-30 ENCOUNTER — ANESTHESIA (OUTPATIENT)
Dept: CARDIOLOGY | Facility: MEDICAL CENTER | Age: 64
End: 2024-10-30
Payer: COMMERCIAL

## 2024-10-30 ENCOUNTER — APPOINTMENT (OUTPATIENT)
Dept: CARDIOLOGY | Facility: MEDICAL CENTER | Age: 64
End: 2024-10-30
Attending: INTERNAL MEDICINE
Payer: COMMERCIAL

## 2024-10-30 ENCOUNTER — ANESTHESIA EVENT (OUTPATIENT)
Dept: CARDIOLOGY | Facility: MEDICAL CENTER | Age: 64
End: 2024-10-30
Payer: COMMERCIAL

## 2024-10-30 VITALS
HEART RATE: 59 BPM | TEMPERATURE: 96.9 F | DIASTOLIC BLOOD PRESSURE: 57 MMHG | RESPIRATION RATE: 16 BRPM | SYSTOLIC BLOOD PRESSURE: 92 MMHG | OXYGEN SATURATION: 93 %

## 2024-10-30 DIAGNOSIS — I48.19 PERSISTENT ATRIAL FIBRILLATION (HCC): ICD-10-CM

## 2024-10-30 LAB
EKG IMPRESSION: NORMAL
EKG IMPRESSION: NORMAL

## 2024-10-30 PROCEDURE — 93010 ELECTROCARDIOGRAM REPORT: CPT | Mod: 59 | Performed by: INTERNAL MEDICINE

## 2024-10-30 PROCEDURE — 160035 HCHG PACU - 1ST 60 MINS PHASE I

## 2024-10-30 PROCEDURE — 160036 HCHG PACU - EA ADDL 30 MINS PHASE I

## 2024-10-30 PROCEDURE — 700111 HCHG RX REV CODE 636 W/ 250 OVERRIDE (IP): Performed by: ANESTHESIOLOGY

## 2024-10-30 PROCEDURE — 160002 HCHG RECOVERY MINUTES (STAT)

## 2024-10-30 PROCEDURE — 93005 ELECTROCARDIOGRAM TRACING: CPT | Performed by: INTERNAL MEDICINE

## 2024-10-30 PROCEDURE — 92960 CARDIOVERSION ELECTRIC EXT: CPT | Performed by: INTERNAL MEDICINE

## 2024-10-30 PROCEDURE — 4410588 CL-CARDIOVERSION

## 2024-10-30 RX ORDER — ONDANSETRON 2 MG/ML
4 INJECTION INTRAMUSCULAR; INTRAVENOUS
Status: DISCONTINUED | OUTPATIENT
Start: 2024-10-30 | End: 2024-10-30 | Stop reason: HOSPADM

## 2024-10-30 RX ORDER — DIPHENHYDRAMINE HYDROCHLORIDE 50 MG/ML
12.5 INJECTION INTRAMUSCULAR; INTRAVENOUS
Status: DISCONTINUED | OUTPATIENT
Start: 2024-10-30 | End: 2024-10-30 | Stop reason: HOSPADM

## 2024-10-30 RX ORDER — HALOPERIDOL 5 MG/ML
1 INJECTION INTRAMUSCULAR
Status: DISCONTINUED | OUTPATIENT
Start: 2024-10-30 | End: 2024-10-30 | Stop reason: HOSPADM

## 2024-10-30 RX ORDER — ALBUTEROL SULFATE 5 MG/ML
2.5 SOLUTION RESPIRATORY (INHALATION)
Status: DISCONTINUED | OUTPATIENT
Start: 2024-10-30 | End: 2024-10-30 | Stop reason: HOSPADM

## 2024-10-30 RX ADMIN — PROPOFOL 70 MG: 10 INJECTION, EMULSION INTRAVENOUS at 09:44

## 2024-11-05 ENCOUNTER — TELEPHONE (OUTPATIENT)
Dept: CARDIOLOGY | Facility: MEDICAL CENTER | Age: 64
End: 2024-11-05
Payer: COMMERCIAL

## 2024-11-05 NOTE — TELEPHONE ENCOUNTER
AL    Caller: Gladis Ball    Topic/issue: Patient had cardioversion on 10/30 and has been experiencing a headache since Sunday. Patient has been taking tylenol, but it does not help. Patient states it is not a pounding headache, but more of a dull ache that does not dissipate. Please advise.     Callback Number: [phone]    Thank you,  Claire MENESES

## 2024-11-05 NOTE — TELEPHONE ENCOUNTER
Phone Number Called: [phone]     Call outcome: Spoke to patient regarding message below.    Pt states that she has had a headache since her cardioversion on Wednesday. It peaked on Sunday and has improved mildly, but remains constant. She denies any vision changes. She denies s/sx of afib, chest pain or SOB. Pt does not monitor blood pressure or HR at home.    She states she drinks about 48oz of water per day. She has been taking Tylenol without much relief.     To AL, pt is concerned that her headache is related to cardioversion. Do you have any recommendations. Thank you!

## 2024-11-05 NOTE — TELEPHONE ENCOUNTER
Noted:    Michi Henao M.D.  You3 minutes ago (2:50 PM)     JA  Cardioversions do not typically cause headaches.    Management depends on detailed headache description, any history, any illness / viral symptoms with it etc etc. I suggest she gets a sick visit with her PCP to address; and if severe enough ER     Phone Number Called: [phone]     Call outcome: Spoke to patient regarding message below.    Discussed AL recommendations. Pt is agreeable to this plan and verbalizes understanding.

## 2024-12-16 ENCOUNTER — PRE-ADMISSION TESTING (OUTPATIENT)
Dept: ADMISSIONS | Facility: MEDICAL CENTER | Age: 64
End: 2024-12-16
Attending: INTERNAL MEDICINE
Payer: COMMERCIAL

## 2024-12-16 DIAGNOSIS — Z01.810 PRE-OPERATIVE CARDIOVASCULAR EXAMINATION: ICD-10-CM

## 2024-12-16 DIAGNOSIS — Z01.812 PRE-OPERATIVE LABORATORY EXAMINATION: ICD-10-CM

## 2024-12-16 LAB
ALBUMIN SERPL BCP-MCNC: 4 G/DL (ref 3.2–4.9)
ALBUMIN/GLOB SERPL: 1.1 G/DL
ALP SERPL-CCNC: 76 U/L (ref 30–99)
ALT SERPL-CCNC: 17 U/L (ref 2–50)
ANION GAP SERPL CALC-SCNC: 11 MMOL/L (ref 7–16)
AST SERPL-CCNC: 25 U/L (ref 12–45)
BASOPHILS # BLD AUTO: 0.7 % (ref 0–1.8)
BASOPHILS # BLD: 0.08 K/UL (ref 0–0.12)
BILIRUB SERPL-MCNC: 0.3 MG/DL (ref 0.1–1.5)
BUN SERPL-MCNC: 17 MG/DL (ref 8–22)
CALCIUM ALBUM COR SERPL-MCNC: 10.1 MG/DL (ref 8.5–10.5)
CALCIUM SERPL-MCNC: 10.1 MG/DL (ref 8.5–10.5)
CHLORIDE SERPL-SCNC: 107 MMOL/L (ref 96–112)
CO2 SERPL-SCNC: 22 MMOL/L (ref 20–33)
CREAT SERPL-MCNC: 1.01 MG/DL (ref 0.5–1.4)
EKG IMPRESSION: NORMAL
EOSINOPHIL # BLD AUTO: 0.28 K/UL (ref 0–0.51)
EOSINOPHIL NFR BLD: 2.5 % (ref 0–6.9)
ERYTHROCYTE [DISTWIDTH] IN BLOOD BY AUTOMATED COUNT: 53.2 FL (ref 35.9–50)
GFR SERPLBLD CREATININE-BSD FMLA CKD-EPI: 63 ML/MIN/1.73 M 2
GLOBULIN SER CALC-MCNC: 3.5 G/DL (ref 1.9–3.5)
GLUCOSE SERPL-MCNC: 132 MG/DL (ref 65–99)
HCT VFR BLD AUTO: 48.4 % (ref 37–47)
HGB BLD-MCNC: 16 G/DL (ref 12–16)
IMM GRANULOCYTES # BLD AUTO: 0.08 K/UL (ref 0–0.11)
IMM GRANULOCYTES NFR BLD AUTO: 0.7 % (ref 0–0.9)
INR PPP: 1.24 (ref 0.87–1.13)
LYMPHOCYTES # BLD AUTO: 2.45 K/UL (ref 1–4.8)
LYMPHOCYTES NFR BLD: 22 % (ref 22–41)
MCH RBC QN AUTO: 33.1 PG (ref 27–33)
MCHC RBC AUTO-ENTMCNC: 33.1 G/DL (ref 32.2–35.5)
MCV RBC AUTO: 100.2 FL (ref 81.4–97.8)
MONOCYTES # BLD AUTO: 1.19 K/UL (ref 0–0.85)
MONOCYTES NFR BLD AUTO: 10.7 % (ref 0–13.4)
NEUTROPHILS # BLD AUTO: 7.04 K/UL (ref 1.82–7.42)
NEUTROPHILS NFR BLD: 63.4 % (ref 44–72)
NRBC # BLD AUTO: 0 K/UL
NRBC BLD-RTO: 0 /100 WBC (ref 0–0.2)
PLATELET # BLD AUTO: 268 K/UL (ref 164–446)
PMV BLD AUTO: 9.1 FL (ref 9–12.9)
POTASSIUM SERPL-SCNC: 4.5 MMOL/L (ref 3.6–5.5)
PROT SERPL-MCNC: 7.5 G/DL (ref 6–8.2)
PROTHROMBIN TIME: 15.6 SEC (ref 12–14.6)
RBC # BLD AUTO: 4.83 M/UL (ref 4.2–5.4)
SODIUM SERPL-SCNC: 140 MMOL/L (ref 135–145)
WBC # BLD AUTO: 11.1 K/UL (ref 4.8–10.8)

## 2024-12-16 PROCEDURE — 36415 COLL VENOUS BLD VENIPUNCTURE: CPT

## 2024-12-16 PROCEDURE — 85610 PROTHROMBIN TIME: CPT

## 2024-12-16 PROCEDURE — 80053 COMPREHEN METABOLIC PANEL: CPT

## 2024-12-16 PROCEDURE — 85025 COMPLETE CBC W/AUTO DIFF WBC: CPT

## 2024-12-16 PROCEDURE — 93005 ELECTROCARDIOGRAM TRACING: CPT | Mod: TC

## 2024-12-16 PROCEDURE — 93010 ELECTROCARDIOGRAM REPORT: CPT | Performed by: INTERNAL MEDICINE

## 2024-12-19 ENCOUNTER — HOSPITAL ENCOUNTER (OUTPATIENT)
Facility: MEDICAL CENTER | Age: 64
End: 2024-12-19
Attending: INTERNAL MEDICINE | Admitting: INTERNAL MEDICINE
Payer: COMMERCIAL

## 2024-12-19 ENCOUNTER — APPOINTMENT (OUTPATIENT)
Dept: CARDIOLOGY | Facility: MEDICAL CENTER | Age: 64
End: 2024-12-19
Attending: NURSE PRACTITIONER
Payer: COMMERCIAL

## 2024-12-19 ENCOUNTER — ANESTHESIA EVENT (OUTPATIENT)
Dept: CARDIOLOGY | Facility: MEDICAL CENTER | Age: 64
End: 2024-12-19
Payer: COMMERCIAL

## 2024-12-19 ENCOUNTER — ANESTHESIA (OUTPATIENT)
Dept: CARDIOLOGY | Facility: MEDICAL CENTER | Age: 64
End: 2024-12-19
Payer: COMMERCIAL

## 2024-12-19 VITALS
BODY MASS INDEX: 39.75 KG/M2 | SYSTOLIC BLOOD PRESSURE: 120 MMHG | TEMPERATURE: 97.2 F | RESPIRATION RATE: 16 BRPM | HEIGHT: 66 IN | WEIGHT: 247.36 LBS | OXYGEN SATURATION: 93 % | HEART RATE: 61 BPM | DIASTOLIC BLOOD PRESSURE: 75 MMHG

## 2024-12-19 DIAGNOSIS — I48.19 PERSISTENT ATRIAL FIBRILLATION (HCC): ICD-10-CM

## 2024-12-19 LAB
ACT BLD: 418 SEC (ref 74–137)
EKG IMPRESSION: NORMAL
LV EJECT FRACT  99904: 60

## 2024-12-19 PROCEDURE — 700105 HCHG RX REV CODE 258: Performed by: STUDENT IN AN ORGANIZED HEALTH CARE EDUCATION/TRAINING PROGRAM

## 2024-12-19 PROCEDURE — 160035 HCHG PACU - 1ST 60 MINS PHASE I

## 2024-12-19 PROCEDURE — 700102 HCHG RX REV CODE 250 W/ 637 OVERRIDE(OP): Performed by: STUDENT IN AN ORGANIZED HEALTH CARE EDUCATION/TRAINING PROGRAM

## 2024-12-19 PROCEDURE — 700101 HCHG RX REV CODE 250: Performed by: STUDENT IN AN ORGANIZED HEALTH CARE EDUCATION/TRAINING PROGRAM

## 2024-12-19 PROCEDURE — 85347 COAGULATION TIME ACTIVATED: CPT

## 2024-12-19 PROCEDURE — 93657 TX L/R ATRIAL FIB ADDL: CPT | Performed by: INTERNAL MEDICINE

## 2024-12-19 PROCEDURE — 93010 ELECTROCARDIOGRAM REPORT: CPT | Performed by: INTERNAL MEDICINE

## 2024-12-19 PROCEDURE — 160047 HCHG PACU  - EA ADDL 30 MINS PHASE II

## 2024-12-19 PROCEDURE — C1894 INTRO/SHEATH, NON-LASER: HCPCS

## 2024-12-19 PROCEDURE — 700101 HCHG RX REV CODE 250

## 2024-12-19 PROCEDURE — 160002 HCHG RECOVERY MINUTES (STAT)

## 2024-12-19 PROCEDURE — 93325 DOPPLER ECHO COLOR FLOW MAPG: CPT

## 2024-12-19 PROCEDURE — A9270 NON-COVERED ITEM OR SERVICE: HCPCS | Performed by: STUDENT IN AN ORGANIZED HEALTH CARE EDUCATION/TRAINING PROGRAM

## 2024-12-19 PROCEDURE — 160036 HCHG PACU - EA ADDL 30 MINS PHASE I

## 2024-12-19 PROCEDURE — 93656 COMPRE EP EVAL ABLTJ ATR FIB: CPT | Performed by: INTERNAL MEDICINE

## 2024-12-19 PROCEDURE — 93005 ELECTROCARDIOGRAM TRACING: CPT | Mod: TC | Performed by: INTERNAL MEDICINE

## 2024-12-19 PROCEDURE — 700111 HCHG RX REV CODE 636 W/ 250 OVERRIDE (IP)

## 2024-12-19 PROCEDURE — 160046 HCHG PACU - 1ST 60 MINS PHASE II

## 2024-12-19 PROCEDURE — 700111 HCHG RX REV CODE 636 W/ 250 OVERRIDE (IP): Performed by: STUDENT IN AN ORGANIZED HEALTH CARE EDUCATION/TRAINING PROGRAM

## 2024-12-19 RX ORDER — ACETAMINOPHEN 325 MG/1
650 TABLET ORAL EVERY 4 HOURS PRN
Status: DISCONTINUED | OUTPATIENT
Start: 2024-12-19 | End: 2024-12-19 | Stop reason: HOSPADM

## 2024-12-19 RX ORDER — ONDANSETRON 2 MG/ML
4 INJECTION INTRAMUSCULAR; INTRAVENOUS EVERY 6 HOURS PRN
Status: DISCONTINUED | OUTPATIENT
Start: 2024-12-19 | End: 2024-12-19 | Stop reason: HOSPADM

## 2024-12-19 RX ORDER — LIDOCAINE HYDROCHLORIDE 20 MG/ML
INJECTION, SOLUTION INFILTRATION; PERINEURAL
Status: COMPLETED
Start: 2024-12-19 | End: 2024-12-19

## 2024-12-19 RX ORDER — SODIUM CHLORIDE, SODIUM LACTATE, POTASSIUM CHLORIDE, CALCIUM CHLORIDE 600; 310; 30; 20 MG/100ML; MG/100ML; MG/100ML; MG/100ML
INJECTION, SOLUTION INTRAVENOUS CONTINUOUS
Status: DISCONTINUED | OUTPATIENT
Start: 2024-12-19 | End: 2024-12-19 | Stop reason: HOSPADM

## 2024-12-19 RX ORDER — HYDROMORPHONE HYDROCHLORIDE 1 MG/ML
0.2 INJECTION, SOLUTION INTRAMUSCULAR; INTRAVENOUS; SUBCUTANEOUS
Status: DISCONTINUED | OUTPATIENT
Start: 2024-12-19 | End: 2024-12-19 | Stop reason: HOSPADM

## 2024-12-19 RX ORDER — PROTAMINE SULFATE 10 MG/ML
INJECTION, SOLUTION INTRAVENOUS PRN
Status: DISCONTINUED | OUTPATIENT
Start: 2024-12-19 | End: 2024-12-19 | Stop reason: SURG

## 2024-12-19 RX ORDER — DIPHENHYDRAMINE HYDROCHLORIDE 50 MG/ML
12.5 INJECTION INTRAMUSCULAR; INTRAVENOUS
Status: DISCONTINUED | OUTPATIENT
Start: 2024-12-19 | End: 2024-12-19 | Stop reason: HOSPADM

## 2024-12-19 RX ORDER — EPHEDRINE SULFATE 50 MG/ML
INJECTION, SOLUTION INTRAVENOUS PRN
Status: DISCONTINUED | OUTPATIENT
Start: 2024-12-19 | End: 2024-12-19 | Stop reason: HOSPADM

## 2024-12-19 RX ORDER — LIDOCAINE HYDROCHLORIDE 20 MG/ML
INJECTION, SOLUTION EPIDURAL; INFILTRATION; INTRACAUDAL; PERINEURAL PRN
Status: DISCONTINUED | OUTPATIENT
Start: 2024-12-19 | End: 2024-12-19 | Stop reason: SURG

## 2024-12-19 RX ORDER — ONDANSETRON 2 MG/ML
4 INJECTION INTRAMUSCULAR; INTRAVENOUS
Status: DISCONTINUED | OUTPATIENT
Start: 2024-12-19 | End: 2024-12-19 | Stop reason: HOSPADM

## 2024-12-19 RX ORDER — HEPARIN SODIUM 200 [USP'U]/100ML
INJECTION, SOLUTION INTRAVENOUS
Status: COMPLETED
Start: 2024-12-19 | End: 2024-12-19

## 2024-12-19 RX ORDER — HALOPERIDOL 5 MG/ML
1 INJECTION INTRAMUSCULAR
Status: DISCONTINUED | OUTPATIENT
Start: 2024-12-19 | End: 2024-12-19 | Stop reason: HOSPADM

## 2024-12-19 RX ORDER — ALBUTEROL SULFATE 5 MG/ML
2.5 SOLUTION RESPIRATORY (INHALATION)
Status: DISCONTINUED | OUTPATIENT
Start: 2024-12-19 | End: 2024-12-19 | Stop reason: HOSPADM

## 2024-12-19 RX ORDER — OXYCODONE HCL 5 MG/5 ML
10 SOLUTION, ORAL ORAL
Status: COMPLETED | OUTPATIENT
Start: 2024-12-19 | End: 2024-12-19

## 2024-12-19 RX ORDER — PROTAMINE SULFATE 10 MG/ML
INJECTION, SOLUTION INTRAVENOUS
Status: COMPLETED
Start: 2024-12-19 | End: 2024-12-19

## 2024-12-19 RX ORDER — ONDANSETRON 2 MG/ML
INJECTION INTRAMUSCULAR; INTRAVENOUS PRN
Status: DISCONTINUED | OUTPATIENT
Start: 2024-12-19 | End: 2024-12-19 | Stop reason: SURG

## 2024-12-19 RX ORDER — OXYCODONE HCL 5 MG/5 ML
5 SOLUTION, ORAL ORAL
Status: COMPLETED | OUTPATIENT
Start: 2024-12-19 | End: 2024-12-19

## 2024-12-19 RX ORDER — HYDROMORPHONE HYDROCHLORIDE 1 MG/ML
0.1 INJECTION, SOLUTION INTRAMUSCULAR; INTRAVENOUS; SUBCUTANEOUS
Status: DISCONTINUED | OUTPATIENT
Start: 2024-12-19 | End: 2024-12-19 | Stop reason: HOSPADM

## 2024-12-19 RX ORDER — DEXAMETHASONE SODIUM PHOSPHATE 4 MG/ML
INJECTION, SOLUTION INTRA-ARTICULAR; INTRALESIONAL; INTRAMUSCULAR; INTRAVENOUS; SOFT TISSUE PRN
Status: DISCONTINUED | OUTPATIENT
Start: 2024-12-19 | End: 2024-12-19 | Stop reason: SURG

## 2024-12-19 RX ORDER — SODIUM CHLORIDE, SODIUM LACTATE, POTASSIUM CHLORIDE, CALCIUM CHLORIDE 600; 310; 30; 20 MG/100ML; MG/100ML; MG/100ML; MG/100ML
INJECTION, SOLUTION INTRAVENOUS
Status: DISCONTINUED | OUTPATIENT
Start: 2024-12-19 | End: 2024-12-19 | Stop reason: SURG

## 2024-12-19 RX ORDER — HEPARIN SODIUM 1000 [USP'U]/ML
INJECTION, SOLUTION INTRAVENOUS; SUBCUTANEOUS
Status: COMPLETED
Start: 2024-12-19 | End: 2024-12-19

## 2024-12-19 RX ORDER — HYDROMORPHONE HYDROCHLORIDE 1 MG/ML
0.4 INJECTION, SOLUTION INTRAMUSCULAR; INTRAVENOUS; SUBCUTANEOUS
Status: DISCONTINUED | OUTPATIENT
Start: 2024-12-19 | End: 2024-12-19 | Stop reason: HOSPADM

## 2024-12-19 RX ORDER — BUPIVACAINE HYDROCHLORIDE 5 MG/ML
INJECTION, SOLUTION EPIDURAL; INTRACAUDAL
Status: COMPLETED
Start: 2024-12-19 | End: 2024-12-19

## 2024-12-19 RX ADMIN — LIDOCAINE HYDROCHLORIDE 100 MG: 20 INJECTION, SOLUTION EPIDURAL; INFILTRATION; INTRACAUDAL; PERINEURAL at 08:01

## 2024-12-19 RX ADMIN — PROTAMINE SULFATE 50 MG: 10 INJECTION, SOLUTION INTRAVENOUS at 09:26

## 2024-12-19 RX ADMIN — OXYCODONE HYDROCHLORIDE 10 MG: 5 SOLUTION ORAL at 10:16

## 2024-12-19 RX ADMIN — LIDOCAINE HYDROCHLORIDE: 20 INJECTION, SOLUTION INFILTRATION; PERINEURAL at 08:08

## 2024-12-19 RX ADMIN — GLYCOPYRROLATE 0.2 MG: 0.2 INJECTION INTRAMUSCULAR; INTRAVENOUS at 08:55

## 2024-12-19 RX ADMIN — EPHEDRINE SULFATE 5 MG: 50 INJECTION, SOLUTION INTRAVENOUS at 08:55

## 2024-12-19 RX ADMIN — SODIUM CHLORIDE, POTASSIUM CHLORIDE, SODIUM LACTATE AND CALCIUM CHLORIDE: 600; 310; 30; 20 INJECTION, SOLUTION INTRAVENOUS at 07:54

## 2024-12-19 RX ADMIN — PROPOFOL 150 MG: 10 INJECTION, EMULSION INTRAVENOUS at 08:01

## 2024-12-19 RX ADMIN — ROCURONIUM BROMIDE 100 MG: 10 INJECTION, SOLUTION INTRAVENOUS at 08:02

## 2024-12-19 RX ADMIN — SUGAMMADEX 200 MG: 100 INJECTION, SOLUTION INTRAVENOUS at 09:37

## 2024-12-19 RX ADMIN — ONDANSETRON 4 MG: 2 INJECTION INTRAMUSCULAR; INTRAVENOUS at 09:28

## 2024-12-19 RX ADMIN — HEPARIN SODIUM 12000 UNITS: 1000 INJECTION, SOLUTION INTRAVENOUS; SUBCUTANEOUS at 08:47

## 2024-12-19 RX ADMIN — DEXAMETHASONE SODIUM PHOSPHATE 4 MG: 4 INJECTION INTRA-ARTICULAR; INTRALESIONAL; INTRAMUSCULAR; INTRAVENOUS; SOFT TISSUE at 08:22

## 2024-12-19 RX ADMIN — HEPARIN SODIUM 4000 UNITS: 200 INJECTION, SOLUTION INTRAVENOUS at 08:09

## 2024-12-19 RX ADMIN — BUPIVACAINE HYDROCHLORIDE: 5 INJECTION, SOLUTION EPIDURAL; INTRACAUDAL at 08:08

## 2024-12-19 ASSESSMENT — PAIN SCALES - GENERAL: PAIN_LEVEL: 0

## 2024-12-19 ASSESSMENT — FIBROSIS 4 INDEX: FIB4 SCORE: 1.4

## 2024-12-19 NOTE — ANESTHESIA PROCEDURE NOTES
SHELLIE    Date/Time: 12/19/2024 8:07 AM    Performed by: Jacques Constantino M.D.  Authorized by: Jacques Constantino M.D.    Start Time:12/19/2024 8:07 AM  Preanesthetic Checklist: patient identified, IV checked, site marked, risks and benefits discussed, surgical consent, monitors and equipment checked, pre-op evaluation and timeout performed    Indication for SHELLIE: diagnostic   Patient Location: OR  Intubated: Yes  Bite Block: Yes  Heart Visualized: Yes  Insertion: atraumatic    **See FULL SHELLIE report in patient's chart via [facility]**

## 2024-12-19 NOTE — ANESTHESIA TIME REPORT
Anesthesia Start and Stop Event Times       Date Time Event    12/19/2024 0745 Ready for Procedure     0754 Anesthesia Start     0946 Anesthesia Stop          Responsible Staff  12/19/24      Name Role Begin End    Jacques Constantino M.D. Anesth 0754 0946          Overtime Reason:  no overtime (within assigned shift)    Comments:

## 2024-12-19 NOTE — ANESTHESIA PROCEDURE NOTES
Airway    Date/Time: 12/19/2024 8:04 AM    Performed by: Jacques Constantino M.D.  Authorized by: Jacques Constantino M.D.    Location:  OR  Urgency:  Elective  Difficult Airway: No    Indications for Airway Management:  Anesthesia      Spontaneous Ventilation: absent    Sedation Level:  Deep  Preoxygenated: Yes    Patient Position:  Sniffing  Mask Difficulty Assessment:  1 - vent by mask  Final Airway Type:  Endotracheal airway  Final Endotracheal Airway:  ETT  Cuffed: Yes    Technique Used for Successful ETT Placement:  Direct laryngoscopy  Devices/Methods Used in Placement:  Intubating stylet    Insertion Site:  Oral  Blade Type:  Hill  Laryngoscope Blade/Videolaryngoscope Blade Size:  2  ETT Size (mm):  7.5  Measured from:  Lips  ETT to Lips (cm):  21  Placement Verified by: capnometry    Cormack-Lehane Classification:  Grade IIa - partial view of glottis  Number of Attempts at Approach:  1

## 2024-12-19 NOTE — DISCHARGE INSTRUCTIONS
[facility] Post Ablation Patient Instructions:  No lifting > 10 lbs x 1 week.      No soaking in baths, hot tubs, pools x 1 week.  May shower the day after discharge and take off groin dressings and leave  sites uncovered.  Continue to monitor sites daily for warmth, redness, discolored drainage.  It is common to have a small lump in the area where the cather was (usually the size of a marble); this will go away but takes approximately 6 weeks to normalize.     3.   Please take all medications as prescribed to you; please do not stop any medications prescribed post ablation unless directed by your healthcare provider.      4.   Please do not miss any doses of your blood thinner (if you have been started on, or take chronic blood thinners) without discussion with your healthcare provider first.     5.   Please walk and take deep breaths after discharge.  After discharge, if you experience neurological changes/signs of stroke or high fever you should be seen in the emergency dept.     6.   It is possible you may experience some chest discomfort or chest tightness post ablation.  This is usually secondary to inflammation and irritation of the tissues at the area of the ablation.  If this occurs, it is advised to try 400 mg of Ibuprofen with food as needed up to three times a day for a maximum of two days.  This should help to decrease pain and tissue inflammation.          **Please notify the office ([phone]) if this occurs.         ** DO NOT TAKE Ibuprofen IF HISTORY OF ALLERGY, SIGNIFICANT BLEEDING OR KIDNEY DISEASE WITHOUT DISCUSSING WITH YOUR CARDIOLOGY PROVIDER FIRST.          ** If pain becomes severe or you have additional symptoms you may need to be medically evaluated; please contact the cardiology office ([phone]) for further direction.     7. It is possible that you may experience arrhythmia/Atrial Fibrillation post ablation.  This is secondary to irritation and  inflammation of the cardiac tissues from the ablation.  If you have atrial fibrillation all day or feel poorly with it, please notify your cardiologist's via phone ([phone]) or mychart.      8.  Please contact call our office ([phone]) or message via [employer] message if you have any questions or concerns post procedurally.    9. You need to be seen for post ablation follow up 3-4 weeks post procedure. An appointment is scheduled for you.  Please contact the office ([phone]) if you need to change your appointment.      What to Expect Post Sedation    Rest and take it easy for the first 24 hours.  A responsible adult is recommended to remain with you during that time.  It is normal to feel sleepy.  We encourage you to not do anything that requires balance, judgment or coordination.    FOR 24 HOURS DO NOT:  Drive, operate machinery or run household appliances.  Drink beer or alcoholic beverages.  Make important decisions or sign legal documents.    To avoid nausea, slowly advance diet as tolerated, avoiding spicy or greasy foods for the first day.  Add more substantial food to your diet according to your provider's instructions.  Babies can be fed formula or breast milk as soon as they are hungry.  INCREASE FLUIDS AND FIBER TO AVOID CONSTIPATION.

## 2024-12-19 NOTE — ANESTHESIA PREPROCEDURE EVALUATION
Date/Time: 12/19/24 0830    Scheduled providers: Xin Huynh M.D.; Jacques Constantino M.D.    Procedure: CL-EP ABLATION ATRIAL FIBRILLATION    Diagnosis:       Persistent atrial fibrillation (HCC) [I48.19]      Other persistent atrial fibrillation [I48.19]    Indications: See Associated Dx    Location: [facility] - Cath Lab - [facility]            Relevant Problems   NEURO   (positive) Ocular migraine      CARDIAC   (positive) AF (atrial fibrillation) ([facility])   (positive) Essential hypertension   (positive) Moderate mitral regurgitation   (positive) Ocular migraine      Other   (positive) Chronic heart failure with preserved ejection fraction (HFpEF) (HCC)   (positive) Tobacco dependence     No prior anesthetic complications. Appropriately NPO.    Vitals:    12/19/24 0633   BP: 135/76   Pulse: 60   Resp: 16   Temp: 36.2 °C (97.2 °F)   SpO2: 93%        Recent Labs     12/16/24  0959   WBC 11.1*   RBC 4.83   HEMOGLOBIN 16.0   HEMATOCRIT 48.4*   .2*   MCH 33.1*   RDW 53.2*   PLATELETCT 268   MPV 9.1   NEUTSPOLYS 63.40   LYMPHOCYTES 22.00   MONOCYTES 10.70   EOSINOPHILS 2.50   BASOPHILS 0.70       Recent Labs     12/16/24  0959   SODIUM 140   POTASSIUM 4.5   CHLORIDE 107   CO2 22   GLUCOSE 132*   BUN 17       Physical Exam    Airway   Mallampati: II  TM distance: >3 FB  Neck ROM: full       Cardiovascular - normal exam  Rhythm: irregular  Rate: normal  (-) murmur     Dental - normal exam             Pulmonary - normal exam  Breath sounds clear to auscultation     Abdominal    Neurological - normal exam                   Anesthesia Plan    ASA 3   ASA physical status 3 criteria: a thrombophilic disease requiring anticoagulation    Plan - general       Airway plan will be ETT  SHELLIE Planned        Induction: intravenous    Postoperative Plan: Postoperative administration of opioids is intended.    Pertinent diagnostic labs and testing reviewed    Informed Consent:    Anesthetic plan and risks discussed  with patient.    Use of blood products discussed with: patient whom consented to blood products.

## 2024-12-19 NOTE — PROGRESS NOTES
"0946 - Arrived to PPU bay 4 with cah lab RN and Dr. Constantino. On 6 L simple mask. Drowsy, arousable with verbal stimulation. Mild nausea per patient, queaseEASE provided. Bilateral pedal pulse 3+. Bilateral groin sites soft, dressing CDI. Suture noted in right groin. Call light provided to patient.     1003 - EKG tech notified patient is in bay 4, EKG order released. EKG tech to come to bedside.     1010 - Alert. Stating pain 7/10 in bilateral groins. Bilateral groin sites, CDI, area soft. Denies nausea at this time.    1015 - EKG tech at bedside. EKG completed.     1016 - Water provided. Swallowing without difficulty.     1027 - joe Petersen, called and updated patient is in recovery room. Nicola to pick patient up at discharge time.     1125 - Chocolate pudding provided to patient. Swallowing without difficulty. Patient stating her pain is getting \"better,\" still having pain at access site and in back from laying flat. Denies any needs at this time.     1400 - SABRINA Duggan at bedside. Removed suture from right groin access site and redressed wound. Patient sat up in bed after, no bleeding noted. Bilateral groin dressing sites remained CDI, area soft. Per SABRINA Duggan patient okay to discharge 45 minutes after ambulating if access sites show no s/s of bleeding.     1415 - Patient ambulated to restroom, voided x1. Steady gait. Bilateral groin dressing CDI, area soft after ambulation.     1500 - PIV removed. Discharge education provided to patient. Verbalized understanding.     1515 - Patient dressing self independently.     1518 - Discharged via wheelchair to car. Nicola Brian in car. Nicola educated on discharge instructions. Verbalized understanding. Bilateral groin dressings remained CDI, area soft.   "

## 2024-12-19 NOTE — H&P
"EP Pre-procedure H and P    Date of service: 12/19/2024    Reason for visit/Chief complaint: Persistent AF    HPI:   Pt is a 63 yo F. History of persistent AF, symptomatic, HFPEF. Here for AF ablation.    Past Medical History:   Diagnosis Date    Anginal syndrome (HCC)     Atrial fibrillation (HCC)     Breath shortness     Bronchitis     Diabetes (HCC)     Prediabetes    Heart burn     Heart valve disease     High cholesterol     Hypertension     Myocardial infarct (HCC)     pt states cardiologist said she did not have a heart attack    Pneumonia     Snoring      Past Surgical History:   Procedure Laterality Date    APPENDECTOMY      BREAST BIOPSY Left     2 biopsies    OTHER CARDIAC SURGERY      cardioversion august 2024    TUBAL LIGATION       Family History   Problem Relation Age of Onset    Diabetes Mother     Hypertension Father     Breast Cancer Maternal Aunt     Cancer Maternal Aunt         breast    Glaucoma Maternal Uncle     Breast Cancer Maternal Grandmother     Cancer Maternal Grandmother         breast    Heart Disease Paternal Grandmother          Physical Exam:  Vitals:    12/19/24 0633   BP: 135/76   Pulse: 60   Resp: 16   Temp: 36.2 °C (97.2 °F)   TempSrc: Temporal   SpO2: 93%   Weight: 112 kg (247 lb 5.7 oz)   Height: 1.676 m (5' 6\")     Gen: NAD, conversant  HEENT: PERRL, EOMI  LUNGS: CTA B, no w/r/r  CV: RRR, no m/r/g, no JVD  Abd: Soft, NT/ND, +BS  Ext: no edema, warm and well perfused    Labs reviewed    EKG interpreted by me:  Sinus fiordaliza    Impression/Recs:  1. Persistent atrial fibrillation (HCC)  EC-SHELLIE W/O CONT    EC-SHELLIE W/O CONT    CL-EP ABLATION ATRIAL FIBRILLATION    CL-EP ABLATION ATRIAL FIBRILLATION        -Risks/benefits/alternatives discussed  -All questions answered  -Proceed with AF ablation    Xin Huynh MD    "

## 2024-12-19 NOTE — ANESTHESIA POSTPROCEDURE EVALUATION
Patient: Gladis Ball    Procedure Summary       Date: 12/19/24 Room / Location: [facility] Imaging - Cath [facility]    Anesthesia Start: 0754 Anesthesia Stop: 0946    Procedure: CL-EP ABLATION ATRIAL FIBRILLATION Diagnosis:       Persistent atrial fibrillation (HCC)      Other persistent atrial fibrillation      (See Associated Dx)    Scheduled Providers: Xin Huynh M.D.; Jacques Constantino M.D. Responsible Provider: Jacques Constantino M.D.    Anesthesia Type: general ASA Status: 3            Final Anesthesia Type: general  Last vitals  BP   Blood Pressure: 113/59    Temp   35.9 °C (96.6 °F)    Pulse   81   Resp   16    SpO2   98 %      Anesthesia Post Evaluation    Patient location during evaluation: PACU  Patient participation: complete - patient participated  Level of consciousness: awake and alert  Pain score: 0    Airway patency: patent  Anesthetic complications: no  Cardiovascular status: hemodynamically stable  Respiratory status: acceptable  Hydration status: euvolemic    PONV: none          No notable events documented.     Nurse Pain Score: 0 (NPRS)

## 2024-12-19 NOTE — OP REPORT
[facility]  Electrophysiology Procedure Note    Procedure(s) Performed:   1) Atrial fibrillation ablation  2) Ablation of additional atrial fibrillation mechanism    Indication(s):  Persistent atrial fibrillation  HFPEF    Physician(s): Xin Huynh M.D.     Resident/Assistant(s): None     Anesthesia: General endotracheal anesthetic with Dr. Constantino     Specimen(s) Removed: None     Estimated Blood Loss:  30cc     Complications:  None     Description of Procedure:   After informed written consent, the patient was brought to the EP lab in the fasting, non-sedated state. The patient was prepped and draped in the usual sterile fashion. Femoral venous access was obtained using the modified Seldinger technique. In the right femoral vein, 1 sheaths (8 Fr) were inserted over 0.035” guidewires. 2 sheaths (8 Fr, 7 Fr) was inserted in the L femoral vein. Perclose was pre-deployed on the R 8 Fr access. A deflectable decapolar catheter was advanced to the CS position. Baseline rhythm 789 ms. An intracardiac echo (ICE) catheter was advanced to the right atrium. ICE was used to identify the atrial septum, left atrial appendage, and pulmonary veins and nic the anatomic structures. During the procedure, ICE was utilized to localize the ablation catheter, monitor for thrombus formation, and to exclude pericardial effusion. Intravenous heparin was administered to maintain -350 seconds. Transseptal left heart catheterization was performed under intracardiac echo and hemodynamic guidance using a Smart Cube system. IV Gylcopyrrolate was given as a vagolytic. We exchanged for a Faradrive sheath. A Farawave catheter was advanced to the LA. Eight applications per PV divided between basket and flower configurations were delivered to isolate the PVs. Additional applications in the flower configuration were delivered along the LA roof and floor to isolate the LA posterior wall as additional substrate of AF. Repeat voltage map was  performed with the Farawave and Rhythmia mapping demonstrating successful PV and PW isolation. Burst pacing failed to induce atrial arrhythmia. At the end of the procedure, heparin was reversed with protamine, the catheter and sheaths were removed, and hemostasis was achieved by Vascade MVP for smaller access and Perclose for the Faradrive access. Following recovery from anesthesia, the patient was transferred to the PPU in good condition.       Total ablation time: 60 applications of Farapulse    Fluoroscopy time: 7.7 minutes     Rhythm at EOS:    1. Sinus  660 ms    Impressions:    1. Persistent atrial fibrillation.    2. Successful PFA pulmonary vein isolation and posterior wall isolation procedure.       Recommendations:  1. Resume anticoagulation.  2. Transfer to monitored bedrest.

## 2025-01-10 ENCOUNTER — OFFICE VISIT (OUTPATIENT)
Dept: CARDIOLOGY | Facility: MEDICAL CENTER | Age: 65
End: 2025-01-10
Attending: NURSE PRACTITIONER
Payer: COMMERCIAL

## 2025-01-10 VITALS
SYSTOLIC BLOOD PRESSURE: 96 MMHG | OXYGEN SATURATION: 95 % | DIASTOLIC BLOOD PRESSURE: 62 MMHG | BODY MASS INDEX: 39.5 KG/M2 | RESPIRATION RATE: 16 BRPM | HEIGHT: 66 IN | WEIGHT: 245.8 LBS | HEART RATE: 71 BPM

## 2025-01-10 DIAGNOSIS — I48.19 PERSISTENT ATRIAL FIBRILLATION (HCC): ICD-10-CM

## 2025-01-10 DIAGNOSIS — I48.21 PERMANENT ATRIAL FIBRILLATION (HCC): ICD-10-CM

## 2025-01-10 DIAGNOSIS — Z86.79 S/P ABLATION OF ATRIAL FIBRILLATION: ICD-10-CM

## 2025-01-10 DIAGNOSIS — Z98.890 S/P ABLATION OF ATRIAL FIBRILLATION: ICD-10-CM

## 2025-01-10 DIAGNOSIS — I34.0 MODERATE MITRAL REGURGITATION: Chronic | ICD-10-CM

## 2025-01-10 LAB — EKG IMPRESSION: NORMAL

## 2025-01-10 PROCEDURE — 3074F SYST BP LT 130 MM HG: CPT | Performed by: NURSE PRACTITIONER

## 2025-01-10 PROCEDURE — 3078F DIAST BP <80 MM HG: CPT | Performed by: NURSE PRACTITIONER

## 2025-01-10 PROCEDURE — 99214 OFFICE O/P EST MOD 30 MIN: CPT | Performed by: NURSE PRACTITIONER

## 2025-01-10 PROCEDURE — 93005 ELECTROCARDIOGRAM TRACING: CPT | Mod: TC | Performed by: NURSE PRACTITIONER

## 2025-01-10 PROCEDURE — 99213 OFFICE O/P EST LOW 20 MIN: CPT | Performed by: NURSE PRACTITIONER

## 2025-01-10 ASSESSMENT — ENCOUNTER SYMPTOMS
ORTHOPNEA: 0
HALLUCINATIONS: 0
MUSCULOSKELETAL NEGATIVE: 1
PND: 0
GASTROINTESTINAL NEGATIVE: 1
NERVOUS/ANXIOUS: 0
DEPRESSION: 0
WHEEZING: 0
BRUISES/BLEEDS EASILY: 0
SENSORY CHANGE: 0
CLAUDICATION: 0
NAUSEA: 0
EYES NEGATIVE: 1

## 2025-01-10 ASSESSMENT — FIBROSIS 4 INDEX: FIB4 SCORE: 1.4

## 2025-01-10 NOTE — ASSESSMENT & PLAN NOTE
- brief salvos palpitations now subsiding   - continue amiodarone for rhythm control  -  likely discontinue at 3 month follow up   - continue Eliquis for stroke protection   - metoprolol for beta blockade

## 2025-01-10 NOTE — PROGRESS NOTES
Atrial Fibrillation Clinic Follow Up Note    DOS: 1/10/2025   [id number]  Gladis Ball    Chief complaint/Reason for consult: post ablation      HPI: Pt is a 64 y.o. female who presents to the clinic today in follow up for post ablation. Patient has a past medical history significant for but not limited to: persistent atrial fibrillation, h/o SHELLIE cardioversion, hypertension, moderate mitral regurgitation, polycythemia, chronic heart failure with preserved EF. Patient underwent ablation approximately 3 weeks ago with targeted sites of isolation pulmonary veins and posterior wall via pulse field catheter. Throat and groin healed well. Procedure, recovery, and lifestyle modifiers discussed in detail. Patient can return to work. Blood pressure and EKG stable.       Past Medical History:   Diagnosis Date    Anginal syndrome (HCC)     Atrial fibrillation (HCC)     Breath shortness     Bronchitis     Diabetes (HCC)     Prediabetes    Heart burn     Heart valve disease     High cholesterol     Hypertension     Myocardial infarct (HCC)     pt states cardiologist said she did not have a heart attack    Pneumonia     Snoring        Past Surgical History:   Procedure Laterality Date    APPENDECTOMY      BREAST BIOPSY Left     2 biopsies    OTHER CARDIAC SURGERY      cardioversion august 2024    TUBAL LIGATION         Social History     Socioeconomic History    Marital status: Single     Spouse name: Not on file    Number of children: Not on file    Years of education: Not on file    Highest education level: Not on file   Occupational History    Not on file   Tobacco Use    Smoking status: Every Day     Current packs/day: 0.20     Types: Cigarettes    Smokeless tobacco: Never    Tobacco comments:     about 2 per day   Vaping Use    Vaping status: Former    Substances: Nicotine, Flavoring    Devices: Refillable tank   Substance and Sexual Activity    Alcohol use: Yes     Alcohol/week: 4.2 oz     Types: 7 Cans of beer per week     Drug use: Not Currently     Comment: denies    Sexual activity: Never     Birth control/protection: Post-Menopausal   Other Topics Concern     Service Not Asked    Blood Transfusions Not Asked    Caffeine Concern No    Occupational Exposure Not Asked    Hobby Hazards Not Asked    Sleep Concern Yes    Stress Concern Not Asked    Weight Concern Yes    Special Diet No    Back Care Not Asked    Exercise No    Bike Helmet Not Asked    Seat Belt Not Asked    Self-Exams Not Asked   Social History Narrative    Not on file     Social Drivers of Health     Financial Resource Strain: Not on file   Food Insecurity: Not on file   Transportation Needs: Not on file   Physical Activity: Not on file   Stress: Not on file   Social Connections: Not on file   Intimate Partner Violence: Not on file   Housing Stability: Not on file       Family History   Problem Relation Age of Onset    Diabetes Mother     Hypertension Father     Breast Cancer Maternal Aunt     Cancer Maternal Aunt         breast    Glaucoma Maternal Uncle     Breast Cancer Maternal Grandmother     Cancer Maternal Grandmother         breast    Heart Disease Paternal Grandmother        Allergies   Allergen Reactions    Codeine Itching     All over body , no rash       Current Outpatient Medications   Medication Sig Dispense Refill    amiodarone (CORDARONE) 200 MG Tab Take 1 Tablet by mouth every day. Take 200mg twice a day for 1 week then 200mg once a day (Patient taking differently: Take 200 mg by mouth every day. Take 200mg twice a day for 1 week then 200mg once a day    MEDICATION INSTRUCTIONS FOR SURGERY/PROCEDURE SCHEDULED FOR 10/30 and 12/19   CONTINUE TAKING MED PRIOR TO SURGERY) 90 Tablet 1    Empagliflozin (JARDIANCE) 10 MG Tab tablet Take 1 Tablet by mouth every day. (Patient taking differently: Take 10 mg by mouth every day.     MEDICATION INSTRUCTIONS FOR SURGERY ON 10/30 and 12/19: FOLLOW INSTRUCTIONS FROM SURGEON OR SPECIALIST.) 90 Tablet 3     furosemide (LASIX) 20 MG Tab Take 1 Tablet by mouth every day. (Patient taking differently: Take 20 mg by mouth every day.     DO NOT TAKE DAY OF SURGERY 10/30 and 12/19) 90 Tablet 3    potassium chloride SA (KDUR) 20 MEQ Tab CR Take 1 Tablet by mouth 2 times a day. (Patient taking differently: Take 20 mEq by mouth 2 times a day.       DO NOT TAKE DAY OF SURGERY 10/30 and 12/19) 180 Tablet 3    ELIQUIS 5 MG Tab TAKE 1 TABLET BY MOUTH TWICE  DAILY (Patient taking differently: Take 5 mg by mouth 2 times a day. MEDICATION INSTRUCTIONS FOR SURGERY ON 10/30 and 12/19: FOLLOW INSTRUCTIONS FROM SURGEON OR SPECIALIST.) 180 Tablet 3    amLODIPine (NORVASC) 10 MG Tab TAKE 1 TABLET BY MOUTH DAILY (Patient taking differently: Take 10 mg by mouth every morning.     MEDICATION INSTRUCTIONS FOR SURGERY/PROCEDURE SCHEDULED FOR 10/30 and 12/19   CONTINUE TAKING MED PRIOR TO SURGERY) 90 Tablet 3    buPROPion (WELLBUTRIN XL) 300 MG XL tablet Take 1 Tablet by mouth every morning. (Patient taking differently: Take 300 mg by mouth every morning.     EDICATION INSTRUCTIONS FOR SURGERY/PROCEDURE SCHEDULED FOR 10/30 and 12/19   CONTINUE TAKING MED PRIOR TO SURGERY) 90 Tablet 3    atorvastatin (LIPITOR) 40 MG Tab Take 1 Tablet by mouth every evening for 360 days. (Patient taking differently: Take 40 mg by mouth every evening.     MEDICATION INSTRUCTIONS FOR SURGERY/PROCEDURE SCHEDULED FOR 10/30 and 12/19   CONTINUE TAKING MED PRIOR TO SURGERY) 90 Tablet 3    metoprolol SR (TOPROL XL) 50 MG TABLET SR 24 HR Take 1 Tablet by mouth every day. (Patient taking differently: Take 50 mg by mouth every morning.     MEDICATION INSTRUCTIONS FOR SURGERY/PROCEDURE SCHEDULED FOR 10/30 and 12/19   CONTINUE TAKING MED PRIOR TO SURGERY) 90 Tablet 3    isosorbide mononitrate SR (IMDUR) 30 MG TABLET SR 24 HR Take 1 Tablet by mouth every morning. (Patient taking differently: Take 30 mg by mouth every morning.       MEDICATION INSTRUCTIONS FOR SURGERY/PROCEDURE  SCHEDULED FOR 10/30 and 12/19  CONTINUE TAKING MED PRIOR TO SURGERY) 90 Tablet 3    Cholecalciferol (VITAMIN D) 2000 UNIT Tab Take 4,000 Units by mouth every day.     MEDICATION INSTRUCTIONS FOR SURGERY/PROCEDURE SCHEDULED FOR 10/30 and 12/19   DO NOT TAKE 7 DAYS PRIOR TO SURGERY      Omega-3 1000 MG Cap Take 1,000 mg by mouth every day.     MEDICATION INSTRUCTIONS FOR SURGERY/PROCEDURE SCHEDULED FOR 10/30 and 12/19   DO NOT TAKE 7 DAYS PRIOR TO SURGERY       No current facility-administered medications for this visit.       Vitals:    01/10/25 1243   BP: 96/62   Pulse: 71   Resp: 16   SpO2: 95%         Review of Systems   HENT: Negative.     Eyes: Negative.    Respiratory:  Negative for wheezing.    Cardiovascular:  Negative for chest pain, orthopnea, claudication, leg swelling and PND.   Gastrointestinal: Negative.  Negative for nausea.   Genitourinary: Negative.    Musculoskeletal: Negative.    Skin: Negative.    Neurological:  Negative for sensory change.   Endo/Heme/Allergies: Negative.  Does not bruise/bleed easily.   Psychiatric/Behavioral:  Negative for depression and hallucinations. The patient is not nervous/anxious.         Prior echo results reviewed: LVEF 55-60%; bi atrial dilation; moderate mitral regurgitation     Prior cath/stress results reviewed:   The left main coronary artery : Normal-appearing large caliber vessel that bifurcates LAD and left circumflex  The left anterior descending coronary artery : Large-caliber transapical vessel with slow coronary flow, improved with intracoronary coronary nitroglycerin.  Gives rise to a large diagonal branch  The left circumflex coronary artery : Large-caliber normal-appearing vessel with slow coronary flow, gives rise to a small high OM1 and large bifurcating OM 2  The right coronary artery  : Large-caliber dominant RCA, normal-appearing with slow coronary flow    EKG interpreted by me: Sinus    Physical Exam  Constitutional:       Appearance: Normal  "appearance.   HENT:      Head: Normocephalic.   Eyes:      Pupils: Pupils are equal, round, and reactive to light.   Neck:      Vascular: No JVD.   Cardiovascular:      Rate and Rhythm: Normal rate and regular rhythm.      Pulses: Normal pulses.      Heart sounds: Normal heart sounds.   Pulmonary:      Effort: Pulmonary effort is normal.      Breath sounds: Normal breath sounds.   Abdominal:      General: Abdomen is flat.      Palpations: Abdomen is soft.   Musculoskeletal:      Cervical back: Normal range of motion.      Right lower leg: No edema.      Left lower leg: No edema.   Skin:     General: Skin is warm and dry.   Neurological:      Mental Status: She is alert and oriented to person, place, and time.   Psychiatric:         Mood and Affect: Mood normal.         Behavior: Behavior normal.          Data:  Lipids:   Lab Results   Component Value Date/Time    CHOLSTRLTOT 115 09/11/2024 11:16 AM    TRIGLYCERIDE 131 09/11/2024 11:16 AM    HDL 48 09/11/2024 11:16 AM    LDL 41 09/11/2024 11:16 AM        BMP:  Lab Results   Component Value Date/Time    SODIUM 141 07/29/2024 1540    POTASSIUM 4.2 07/29/2024 1540    CHLORIDE 106 07/29/2024 1540    CO2 21 07/29/2024 1540    GLUCOSE 101 (H) 07/29/2024 1540    BUN 16 07/29/2024 1540    CREATININE 1.16 07/29/2024 1540    CALCIUM 9.7 07/29/2024 1540    ANION 14.0 07/29/2024 1540       GFR:  Lab Results   Component Value Date/Time    IFAFRICA >60 01/30/2020 0831    IFNOTAFR >60 01/30/2020 0831        TSH:   Lab Results   Component Value Date/Time    TSHULTRASEN 1.100 05/21/2024 1335       MAGNESIUM:  Lab Results   Component Value Date/Time    MAGNESIUM 2.2 05/22/2024 0607        THYROXINE (T4):   No results found for: \"FREEDIR\"     CBC:   Lab Results   Component Value Date/Time    WBC 11.1 (H) 12/16/2024 09:59 AM    RBC 4.83 12/16/2024 09:59 AM    HEMOGLOBIN 16.0 12/16/2024 09:59 AM    HEMATOCRIT 48.4 (H) 12/16/2024 09:59 AM    .2 (H) 12/16/2024 09:59 AM    [facility] 33.1 " "(H) 12/16/2024 09:59 AM    MCHC 33.1 12/16/2024 09:59 AM    RDW 53.2 (H) 12/16/2024 09:59 AM    PLATELETCT 268 12/16/2024 09:59 AM    MPV 9.1 12/16/2024 09:59 AM    NEUTSPOLYS 63.40 12/16/2024 09:59 AM    LYMPHOCYTES 22.00 12/16/2024 09:59 AM    MONOCYTES 10.70 12/16/2024 09:59 AM    EOSINOPHILS 2.50 12/16/2024 09:59 AM    BASOPHILS 0.70 12/16/2024 09:59 AM    IMMGRAN 0.70 12/16/2024 09:59 AM    NRBC 0.00 12/16/2024 09:59 AM    NEUTS 7.04 12/16/2024 09:59 AM    LYMPHS 2.45 12/16/2024 09:59 AM    MONOS 1.19 (H) 12/16/2024 09:59 AM    EOS 0.28 12/16/2024 09:59 AM    BASO 0.08 12/16/2024 09:59 AM    IMMGRANAB 0.08 12/16/2024 09:59 AM    NRBCAB 0.00 12/16/2024 09:59 AM        CBC w/o DIFF  Lab Results   Component Value Date/Time    WBC 11.1 (H) 12/16/2024 09:59 AM    RBC 4.83 12/16/2024 09:59 AM    HEMOGLOBIN 16.0 12/16/2024 09:59 AM    .2 (H) 12/16/2024 09:59 AM    MCH 33.1 (H) 12/16/2024 09:59 AM    MCHC 33.1 12/16/2024 09:59 AM    RDW 53.2 (H) 12/16/2024 09:59 AM    MPV 9.1 12/16/2024 09:59 AM       LIVER:  Lab Results   Component Value Date/Time    ALKPHOSPHAT 76 12/16/2024 09:59 AM    ASTSGOT 25 12/16/2024 09:59 AM    ALTSGPT 17 12/16/2024 09:59 AM    TBILIRUBIN 0.3 12/16/2024 09:59 AM       BNP:  No results found for: \"BNPBTYPENAT\"    PT/INR:  Lab Results   Component Value Date/Time    PROTHROMBTM 15.6 (H) 12/16/2024 09:59 AM    PROTHROMBTM 15.1 (H) 07/29/2024 03:40 PM    INR 1.24 (H) 12/16/2024 09:59 AM    INR 1.18 (H) 07/29/2024 03:40 PM             Impression/Plan:  S/P ablation of atrial fibrillation   - brief salvos palpitations now subsiding   - continue amiodarone for rhythm control  -  likely discontinue at 3 month follow up   - continue Eliquis for stroke protection   - metoprolol for beta blockade     Moderate mitral regurgitation   - repeat echocardiogram after full ablation healing to reassess mitral valve          Lifestyle Modification for better heart health care as well as beneficial for " prevention of worsening the atrial arrhythmia progression. Lifestyle modification discussed includes diet and reduction of sodium. Patients should eat more of a Mediterranean diet and be very careful with how much processed and fast food they eat. Excessive sodium intake can result in fluid retention which can add additional strain to patients cardiac electrical system. Weight loss can also help long term with cardiac health. For patients with BMI above 25kg/m2, studies have shown a greater chance of progression of disease as well as recurrence after interventions. ARREST-AF study showed less recurrence of AF and slower disease progression in patients with BMI below 27kg/m2. Smoking and vaping should be stopped. Moderation on caffeine and alcohol. Excessive alcohol or caffeine can result in reactions and antagonize the hearts electrical system. Patient that fit the matrix for sleep apnea should be referred for a formal study and should try to be compliant with treatment for sleep apnea. Stay hydrated and stay active. Studies have shown that staying physically active can help heart health. The CARDIO-FIT study showed sedentary individuals lead to faster time of recurrence of arrhythmia. Exercise goals should be trying to maintain 120-200 minutes per week of exercise.      A total of 30 minutes of time was spent on day of encounter reviewing medical record, performing history and examination, counseling, ordering medication/test/consults, collaborating with referring service, and documentation.    Gustavo Farfan AGACNP-EP  Cardiac Electrophysiology

## 2025-02-20 SDOH — ECONOMIC STABILITY: TRANSPORTATION INSECURITY
IN THE PAST 12 MONTHS, HAS LACK OF TRANSPORTATION KEPT YOU FROM MEETINGS, WORK, OR FROM GETTING THINGS NEEDED FOR DAILY LIVING?: NO

## 2025-02-20 SDOH — ECONOMIC STABILITY: FOOD INSECURITY: WITHIN THE PAST 12 MONTHS, THE FOOD YOU BOUGHT JUST DIDN'T LAST AND YOU DIDN'T HAVE MONEY TO GET MORE.: NEVER TRUE

## 2025-02-20 SDOH — HEALTH STABILITY: PHYSICAL HEALTH: ON AVERAGE, HOW MANY DAYS PER WEEK DO YOU ENGAGE IN MODERATE TO STRENUOUS EXERCISE (LIKE A BRISK WALK)?: 0 DAYS

## 2025-02-20 SDOH — ECONOMIC STABILITY: FOOD INSECURITY: WITHIN THE PAST 12 MONTHS, YOU WORRIED THAT YOUR FOOD WOULD RUN OUT BEFORE YOU GOT MONEY TO BUY MORE.: NEVER TRUE

## 2025-02-20 SDOH — HEALTH STABILITY: PHYSICAL HEALTH: ON AVERAGE, HOW MANY MINUTES DO YOU ENGAGE IN EXERCISE AT THIS LEVEL?: 0 MIN

## 2025-02-20 SDOH — ECONOMIC STABILITY: INCOME INSECURITY: HOW HARD IS IT FOR YOU TO PAY FOR THE VERY BASICS LIKE FOOD, HOUSING, MEDICAL CARE, AND HEATING?: NOT HARD AT ALL

## 2025-02-20 SDOH — ECONOMIC STABILITY: INCOME INSECURITY: IN THE LAST 12 MONTHS, WAS THERE A TIME WHEN YOU WERE NOT ABLE TO PAY THE MORTGAGE OR RENT ON TIME?: NO

## 2025-02-20 SDOH — ECONOMIC STABILITY: TRANSPORTATION INSECURITY
IN THE PAST 12 MONTHS, HAS THE LACK OF TRANSPORTATION KEPT YOU FROM MEDICAL APPOINTMENTS OR FROM GETTING MEDICATIONS?: NO

## 2025-02-20 SDOH — ECONOMIC STABILITY: TRANSPORTATION INSECURITY
IN THE PAST 12 MONTHS, HAS LACK OF RELIABLE TRANSPORTATION KEPT YOU FROM MEDICAL APPOINTMENTS, MEETINGS, WORK OR FROM GETTING THINGS NEEDED FOR DAILY LIVING?: NO

## 2025-02-20 SDOH — ECONOMIC STABILITY: HOUSING INSECURITY
IN THE LAST 12 MONTHS, WAS THERE A TIME WHEN YOU DID NOT HAVE A STEADY PLACE TO SLEEP OR SLEPT IN A SHELTER (INCLUDING NOW)?: NO

## 2025-02-20 SDOH — HEALTH STABILITY: MENTAL HEALTH
STRESS IS WHEN SOMEONE FEELS TENSE, NERVOUS, ANXIOUS, OR CAN'T SLEEP AT NIGHT BECAUSE THEIR MIND IS TROUBLED. HOW STRESSED ARE YOU?: TO SOME EXTENT

## 2025-02-20 ASSESSMENT — SOCIAL DETERMINANTS OF HEALTH (SDOH)
IN A TYPICAL WEEK, HOW MANY TIMES DO YOU TALK ON THE PHONE WITH FAMILY, FRIENDS, OR NEIGHBORS?: PATIENT DECLINED
HOW OFTEN DO YOU GET TOGETHER WITH FRIENDS OR RELATIVES?: PATIENT DECLINED
DO YOU BELONG TO ANY CLUBS OR ORGANIZATIONS SUCH AS CHURCH GROUPS UNIONS, FRATERNAL OR ATHLETIC GROUPS, OR SCHOOL GROUPS?: NO
HOW OFTEN DO YOU ATTENT MEETINGS OF THE CLUB OR ORGANIZATION YOU BELONG TO?: NEVER
HOW OFTEN DO YOU HAVE A DRINK CONTAINING ALCOHOL: PATIENT DECLINED
HOW OFTEN DO YOU GET TOGETHER WITH FRIENDS OR RELATIVES?: PATIENT DECLINED
IN THE PAST 12 MONTHS, HAS THE ELECTRIC, GAS, OIL, OR WATER COMPANY THREATENED TO SHUT OFF SERVICE IN YOUR HOME?: NO
HOW OFTEN DO YOU ATTEND CHURCH OR RELIGIOUS SERVICES?: PATIENT DECLINED
IN A TYPICAL WEEK, HOW MANY TIMES DO YOU TALK ON THE PHONE WITH FAMILY, FRIENDS, OR NEIGHBORS?: PATIENT DECLINED
HOW OFTEN DO YOU ATTENT MEETINGS OF THE CLUB OR ORGANIZATION YOU BELONG TO?: NEVER
HOW HARD IS IT FOR YOU TO PAY FOR THE VERY BASICS LIKE FOOD, HOUSING, MEDICAL CARE, AND HEATING?: NOT HARD AT ALL
HOW OFTEN DO YOU ATTEND CHURCH OR RELIGIOUS SERVICES?: PATIENT DECLINED
IN A TYPICAL WEEK, HOW MANY TIMES DO YOU TALK ON THE PHONE WITH FAMILY, FRIENDS, OR NEIGHBORS?: PATIENT DECLINED
WITHIN THE PAST 12 MONTHS, YOU WORRIED THAT YOUR FOOD WOULD RUN OUT BEFORE YOU GOT THE MONEY TO BUY MORE: NEVER TRUE
HOW MANY DRINKS CONTAINING ALCOHOL DO YOU HAVE ON A TYPICAL DAY WHEN YOU ARE DRINKING: PATIENT DECLINED
IN THE PAST 12 MONTHS, HAS THE ELECTRIC, GAS, OIL, OR WATER COMPANY THREATENED TO SHUT OFF SERVICE IN YOUR HOME?: NO
HOW OFTEN DO YOU GET TOGETHER WITH FRIENDS OR RELATIVES?: PATIENT DECLINED
HOW OFTEN DO YOU HAVE SIX OR MORE DRINKS ON ONE OCCASION: PATIENT DECLINED
HOW OFTEN DO YOU ATTEND CHURCH OR RELIGIOUS SERVICES?: PATIENT DECLINED
DO YOU BELONG TO ANY CLUBS OR ORGANIZATIONS SUCH AS CHURCH GROUPS UNIONS, FRATERNAL OR ATHLETIC GROUPS, OR SCHOOL GROUPS?: NO
DO YOU BELONG TO ANY CLUBS OR ORGANIZATIONS SUCH AS CHURCH GROUPS UNIONS, FRATERNAL OR ATHLETIC GROUPS, OR SCHOOL GROUPS?: NO
HOW OFTEN DO YOU ATTENT MEETINGS OF THE CLUB OR ORGANIZATION YOU BELONG TO?: NEVER

## 2025-02-20 ASSESSMENT — LIFESTYLE VARIABLES
HOW MANY STANDARD DRINKS CONTAINING ALCOHOL DO YOU HAVE ON A TYPICAL DAY: PATIENT DECLINED
HOW OFTEN DO YOU HAVE A DRINK CONTAINING ALCOHOL: PATIENT DECLINED
AUDIT-C TOTAL SCORE: -1
HOW OFTEN DO YOU HAVE A DRINK CONTAINING ALCOHOL: PATIENT DECLINED
SKIP TO QUESTIONS 9-10: 0
HOW MANY STANDARD DRINKS CONTAINING ALCOHOL DO YOU HAVE ON A TYPICAL DAY: PATIENT DECLINED
HOW OFTEN DO YOU HAVE SIX OR MORE DRINKS ON ONE OCCASION: PATIENT DECLINED
HOW OFTEN DO YOU HAVE SIX OR MORE DRINKS ON ONE OCCASION: PATIENT DECLINED
AUDIT-C TOTAL SCORE: -1
SKIP TO QUESTIONS 9-10: 0

## 2025-02-21 ENCOUNTER — APPOINTMENT (OUTPATIENT)
Dept: MEDICAL GROUP | Facility: MEDICAL CENTER | Age: 65
End: 2025-02-21
Payer: COMMERCIAL

## 2025-02-21 SDOH — HEALTH STABILITY: PHYSICAL HEALTH: ON AVERAGE, HOW MANY DAYS PER WEEK DO YOU ENGAGE IN MODERATE TO STRENUOUS EXERCISE (LIKE A BRISK WALK)?: 0 DAYS

## 2025-02-21 SDOH — HEALTH STABILITY: PHYSICAL HEALTH: ON AVERAGE, HOW MANY MINUTES DO YOU ENGAGE IN EXERCISE AT THIS LEVEL?: 0 MIN

## 2025-02-21 ASSESSMENT — SOCIAL DETERMINANTS OF HEALTH (SDOH)
DO YOU BELONG TO ANY CLUBS OR ORGANIZATIONS SUCH AS CHURCH GROUPS UNIONS, FRATERNAL OR ATHLETIC GROUPS, OR SCHOOL GROUPS?: NO
HOW OFTEN DO YOU GET TOGETHER WITH FRIENDS OR RELATIVES?: PATIENT DECLINED
HOW OFTEN DO YOU ATTENT MEETINGS OF THE CLUB OR ORGANIZATION YOU BELONG TO?: NEVER
HOW MANY DRINKS CONTAINING ALCOHOL DO YOU HAVE ON A TYPICAL DAY WHEN YOU ARE DRINKING: PATIENT DECLINED
HOW OFTEN DO YOU HAVE A DRINK CONTAINING ALCOHOL: PATIENT DECLINED
WITHIN THE PAST 12 MONTHS, YOU WORRIED THAT YOUR FOOD WOULD RUN OUT BEFORE YOU GOT THE MONEY TO BUY MORE: NEVER TRUE
HOW OFTEN DO YOU ATTEND CHURCH OR RELIGIOUS SERVICES?: PATIENT DECLINED
HOW HARD IS IT FOR YOU TO PAY FOR THE VERY BASICS LIKE FOOD, HOUSING, MEDICAL CARE, AND HEATING?: NOT HARD AT ALL
IN A TYPICAL WEEK, HOW MANY TIMES DO YOU TALK ON THE PHONE WITH FAMILY, FRIENDS, OR NEIGHBORS?: PATIENT DECLINED
HOW OFTEN DO YOU HAVE SIX OR MORE DRINKS ON ONE OCCASION: PATIENT DECLINED

## 2025-02-25 ENCOUNTER — HOSPITAL ENCOUNTER (EMERGENCY)
Facility: MEDICAL CENTER | Age: 65
End: 2025-02-25
Attending: EMERGENCY MEDICINE
Payer: COMMERCIAL

## 2025-02-25 ENCOUNTER — APPOINTMENT (OUTPATIENT)
Dept: RADIOLOGY | Facility: MEDICAL CENTER | Age: 65
End: 2025-02-25
Attending: EMERGENCY MEDICINE
Payer: COMMERCIAL

## 2025-02-25 VITALS
OXYGEN SATURATION: 92 % | DIASTOLIC BLOOD PRESSURE: 73 MMHG | SYSTOLIC BLOOD PRESSURE: 116 MMHG | RESPIRATION RATE: 16 BRPM | BODY MASS INDEX: 40.21 KG/M2 | TEMPERATURE: 98 F | HEIGHT: 66 IN | HEART RATE: 57 BPM | WEIGHT: 250.22 LBS

## 2025-02-25 DIAGNOSIS — R04.2 HEMOPTYSIS: ICD-10-CM

## 2025-02-25 LAB
ALBUMIN SERPL BCP-MCNC: 4 G/DL (ref 3.2–4.9)
ALBUMIN/GLOB SERPL: 1.2 G/DL
ALP SERPL-CCNC: 74 U/L (ref 30–99)
ALT SERPL-CCNC: 14 U/L (ref 2–50)
ANION GAP SERPL CALC-SCNC: 10 MMOL/L (ref 7–16)
APTT PPP: 30.2 SEC (ref 24.7–36)
AST SERPL-CCNC: 21 U/L (ref 12–45)
BASOPHILS # BLD AUTO: 0.9 % (ref 0–1.8)
BASOPHILS # BLD: 0.09 K/UL (ref 0–0.12)
BILIRUB SERPL-MCNC: 0.5 MG/DL (ref 0.1–1.5)
BUN SERPL-MCNC: 18 MG/DL (ref 8–22)
CALCIUM ALBUM COR SERPL-MCNC: 9.7 MG/DL (ref 8.5–10.5)
CALCIUM SERPL-MCNC: 9.7 MG/DL (ref 8.4–10.2)
CHLORIDE SERPL-SCNC: 106 MMOL/L (ref 96–112)
CO2 SERPL-SCNC: 22 MMOL/L (ref 20–33)
CREAT SERPL-MCNC: 0.86 MG/DL (ref 0.5–1.4)
EOSINOPHIL # BLD AUTO: 0.23 K/UL (ref 0–0.51)
EOSINOPHIL NFR BLD: 2.3 % (ref 0–6.9)
ERYTHROCYTE [DISTWIDTH] IN BLOOD BY AUTOMATED COUNT: 54.1 FL (ref 35.9–50)
GFR SERPLBLD CREATININE-BSD FMLA CKD-EPI: 76 ML/MIN/1.73 M 2
GLOBULIN SER CALC-MCNC: 3.3 G/DL (ref 1.9–3.5)
GLUCOSE SERPL-MCNC: 106 MG/DL (ref 65–99)
HCT VFR BLD AUTO: 47.4 % (ref 37–47)
HGB BLD-MCNC: 15.5 G/DL (ref 12–16)
IMM GRANULOCYTES # BLD AUTO: 0.05 K/UL (ref 0–0.11)
IMM GRANULOCYTES NFR BLD AUTO: 0.5 % (ref 0–0.9)
INR PPP: 1.39 (ref 0.87–1.13)
LYMPHOCYTES # BLD AUTO: 2.26 K/UL (ref 1–4.8)
LYMPHOCYTES NFR BLD: 22.5 % (ref 22–41)
MCH RBC QN AUTO: 34.1 PG (ref 27–33)
MCHC RBC AUTO-ENTMCNC: 32.7 G/DL (ref 32.2–35.5)
MCV RBC AUTO: 104.4 FL (ref 81.4–97.8)
MONOCYTES # BLD AUTO: 0.98 K/UL (ref 0–0.85)
MONOCYTES NFR BLD AUTO: 9.8 % (ref 0–13.4)
NEUTROPHILS # BLD AUTO: 6.42 K/UL (ref 1.82–7.42)
NEUTROPHILS NFR BLD: 64 % (ref 44–72)
NRBC # BLD AUTO: 0 K/UL
NRBC BLD-RTO: 0 /100 WBC (ref 0–0.2)
PLATELET # BLD AUTO: 268 K/UL (ref 164–446)
PMV BLD AUTO: 9.1 FL (ref 9–12.9)
POTASSIUM SERPL-SCNC: 4.2 MMOL/L (ref 3.6–5.5)
PROT SERPL-MCNC: 7.3 G/DL (ref 6–8.2)
PROTHROMBIN TIME: 17.5 SEC (ref 12–14.6)
RBC # BLD AUTO: 4.54 M/UL (ref 4.2–5.4)
SODIUM SERPL-SCNC: 138 MMOL/L (ref 135–145)
WBC # BLD AUTO: 10 K/UL (ref 4.8–10.8)

## 2025-02-25 PROCEDURE — 85025 COMPLETE CBC W/AUTO DIFF WBC: CPT

## 2025-02-25 PROCEDURE — 80053 COMPREHEN METABOLIC PANEL: CPT

## 2025-02-25 PROCEDURE — 85730 THROMBOPLASTIN TIME PARTIAL: CPT

## 2025-02-25 PROCEDURE — 85610 PROTHROMBIN TIME: CPT

## 2025-02-25 PROCEDURE — 36415 COLL VENOUS BLD VENIPUNCTURE: CPT

## 2025-02-25 PROCEDURE — 99283 EMERGENCY DEPT VISIT LOW MDM: CPT

## 2025-02-25 PROCEDURE — 71045 X-RAY EXAM CHEST 1 VIEW: CPT

## 2025-02-25 ASSESSMENT — FIBROSIS 4 INDEX: FIB4 SCORE: 1.4

## 2025-02-25 NOTE — ED PROVIDER NOTES
ED PHYSICIAN NOTE    CHIEF COMPLAINT  Chief Complaint   Patient presents with    Blood in Sputum     When she has a coughing fit since Friday intermittently. Patient is on eliquis.        EXTERNAL RECORDS REVIEWED  Outpatient Notes patient with history of atrial fibrillation status post ablation    HPI/ROS      Gladis Ball is a 64 y.o. female who presents to the emergency department after coughing up blood on 2 occasions.  On Friday she woke up gagging and had a coughing fit.  She coughed up bright red blood about a palm sized in the sink.  She then leaned over the sink and reports blood was dripping out of her mouth.  This was bright red blood.  No bleeding from her nose or elsewhere.  Stopped without any other intervention or issue.  She otherwise felt fine.  She did not have any problems over 3 days, but in about 3 this morning she woke up again and coughed up some bright red blood.  Less than the first episode, but also feeling like she was choking.  She has some discomfort in her throat.  She has no shortness of breath or chest pain.  No fevers or recent illness.  No change in voice.  She is able to swallow without difficulty.    Patient is on Eliquis for atrial fibrillation.    PAST MEDICAL HISTORY  Past Medical History:   Diagnosis Date    Anginal syndrome (HCC)     Atrial fibrillation (HCC)     Breath shortness     Bronchitis     Diabetes (HCC)     Prediabetes    Heart burn     Heart valve disease     High cholesterol     Hypertension     Myocardial infarct (HCC)     pt states cardiologist said she did not have a heart attack    Pneumonia     Snoring        SOCIAL HISTORY  Social History     Tobacco Use    Smoking status: Every Day     Current packs/day: 0.20     Types: Cigarettes    Smokeless tobacco: Never    Tobacco comments:     about 2 per day   Vaping Use    Vaping status: Former    Substances: Nicotine, Flavoring    Devices: Refillable tank   Substance Use Topics    Alcohol use: Yes      "Alcohol/week: 4.2 oz     Types: 7 Cans of beer per week    Drug use: Not Currently     Comment: denies       CURRENT MEDICATIONS  Home Medications       Reviewed by Parveen Kee R.N. (Registered Nurse) on 02/25/25 at 0945  Med List Status: Not Addressed     Medication Last Dose Status   amiodarone (CORDARONE) 200 MG Tab  Active   amLODIPine (NORVASC) 10 MG Tab  Active   atorvastatin (LIPITOR) 40 MG Tab  Active   buPROPion (WELLBUTRIN XL) 300 MG XL tablet  Active   Cholecalciferol (VITAMIN D) 2000 UNIT Tab  Active   ELIQUIS 5 MG Tab  Active   Empagliflozin (JARDIANCE) 10 MG Tab tablet  Active   furosemide (LASIX) 20 MG Tab  Active   isosorbide mononitrate SR (IMDUR) 30 MG TABLET SR 24 HR  Active   metoprolol SR (TOPROL XL) 50 MG TABLET SR 24 HR  Active   Omega-3 1000 MG Cap  Active   potassium chloride SA (KDUR) 20 MEQ Tab CR  Active                    ALLERGIES  Allergies   Allergen Reactions    Codeine Itching     All over body , no rash       PHYSICAL EXAM  VITAL SIGNS: /73   Pulse (!) 57   Temp 36.7 °C (98 °F) (Temporal)   Resp 16   Ht 1.676 m (5' 6\")   Wt 113 kg (250 lb 3.6 oz)   SpO2 92%   BMI 40.39 kg/m²    Constitutional: Awake and alert  HENT: Nares clear.  No abnormality.  Pharynx unremarkable.  Eyes: Normal inspection  Neck: Grossly normal range of motion.  Cardiovascular: Normal heart rate, Normal rhythm.  Symmetric peripheral pulses.   Thorax & Lungs: No respiratory distress, No wheezing, No rales, No rhonchi, No chest tenderness.   Abdomen: Bowel sounds normal, soft, non-distended, nontender, no mass  Skin: No rash.  Back: No tenderness, No CVA tenderness.   Extremities: No clubbing, cyanosis, edema, no Homans or cords.  Neurologic: Grossly normal   Psychiatric: Normal for situation     DIAGNOSTIC STUDIES / PROCEDURES  LABS/EKG  Results for orders placed or performed during the hospital encounter of 02/25/25   CBC WITH DIFFERENTIAL    Collection Time: 02/25/25 10:11 AM   Result Value " Ref Range    WBC 10.0 4.8 - 10.8 K/uL    RBC 4.54 4.20 - 5.40 M/uL    Hemoglobin 15.5 12.0 - 16.0 g/dL    Hematocrit 47.4 (H) 37.0 - 47.0 %    .4 (H) 81.4 - 97.8 fL    MCH 34.1 (H) 27.0 - 33.0 pg    MCHC 32.7 32.2 - 35.5 g/dL    RDW 54.1 (H) 35.9 - 50.0 fL    Platelet Count 268 164 - 446 K/uL    MPV 9.1 9.0 - 12.9 fL    Neutrophils-Polys 64.00 44.00 - 72.00 %    Lymphocytes 22.50 22.00 - 41.00 %    Monocytes 9.80 0.00 - 13.40 %    Eosinophils 2.30 0.00 - 6.90 %    Basophils 0.90 0.00 - 1.80 %    Immature Granulocytes 0.50 0.00 - 0.90 %    Nucleated RBC 0.00 0.00 - 0.20 /100 WBC    Neutrophils (Absolute) 6.42 1.82 - 7.42 K/uL    Lymphs (Absolute) 2.26 1.00 - 4.80 K/uL    Monos (Absolute) 0.98 (H) 0.00 - 0.85 K/uL    Eos (Absolute) 0.23 0.00 - 0.51 K/uL    Baso (Absolute) 0.09 0.00 - 0.12 K/uL    Immature Granulocytes (abs) 0.05 0.00 - 0.11 K/uL    NRBC (Absolute) 0.00 K/uL   COMP METABOLIC PANEL    Collection Time: 02/25/25 10:11 AM   Result Value Ref Range    Sodium 138 135 - 145 mmol/L    Potassium 4.2 3.6 - 5.5 mmol/L    Chloride 106 96 - 112 mmol/L    Co2 22 20 - 33 mmol/L    Anion Gap 10.0 7.0 - 16.0    Glucose 106 (H) 65 - 99 mg/dL    Bun 18 8 - 22 mg/dL    Creatinine 0.86 0.50 - 1.40 mg/dL    Calcium 9.7 8.4 - 10.2 mg/dL    Correct Calcium 9.7 8.5 - 10.5 mg/dL    AST(SGOT) 21 12 - 45 U/L    ALT(SGPT) 14 2 - 50 U/L    Alkaline Phosphatase 74 30 - 99 U/L    Total Bilirubin 0.5 0.1 - 1.5 mg/dL    Albumin 4.0 3.2 - 4.9 g/dL    Total Protein 7.3 6.0 - 8.2 g/dL    Globulin 3.3 1.9 - 3.5 g/dL    A-G Ratio 1.2 g/dL   APTT    Collection Time: 02/25/25 10:11 AM   Result Value Ref Range    APTT 30.2 24.7 - 36.0 sec   PROTHROMBIN TIME (INR)    Collection Time: 02/25/25 10:11 AM   Result Value Ref Range    PT 17.5 (H) 12.0 - 14.6 sec    INR 1.39 (H) 0.87 - 1.13   ESTIMATED GFR    Collection Time: 02/25/25 10:11 AM   Result Value Ref Range    GFR (CKD-EPI) 76 >60 mL/min/1.73 m 2          RADIOLOGY  I have  independently interpreted the diagnostic imaging associated with this visit and am waiting the final reading from the radiologist.   My preliminary interpretation is as follows: Chest x-ray without infiltrate    COURSE & MEDICAL DECISION MAKING    INITIAL ASSESSMENT, COURSE AND PLAN  Case narrative: Patient presents with hemoptysis.  She describes choking on blood as well as blood pooling out of her mouth even without coughing.  Perhaps this is more in her upper airway rather than in her lungs.  She denied hematemesis.  Do not see any abnormal lesions in her nose or mouth.  No evidence of prior bleeding.  Her vital signs are normal.  Ordered laboratory data and x-ray.  No clinical suggestion of PE or indication for CT scan of the lungs or upper airway.  No active bleeding.    Data returned unremarkable.    At this point I suspect most likely this is an upper airway or posterior pharyngeal source of bleeding.  Patient did describe having some discomfort in her neck which could be from localized irritation.I would like patient to be seen by otolaryngology for direct visualization.  Patient voiced understanding.  I precaution patient to return to ER if she has recurrent severe bleeding, any difficulty breathing, dizziness, lightheadedness, black bloody stool or concern.            DISPOSITION AND DISCUSSIONS    Escalation of care considered, and ultimately not performed:diagnostic imaging and acute inpatient care management, however at this time, the patient is most appropriate for outpatient management        Follow up  Madalyn Kerns, RUBENR.N.  [address]  [address]  [facility] [id number] [phone]            You need to see an otolaryngologist for further evaluation.        FINAL IMPRESSION  1.  Hemoptysis, suspected upper airway source    This dictation was created using voice recognition software. The accuracy of the dictation is limited to the abilities of the software. I expect there may be some  errors of grammar and possibly content. The nursing notes were reviewed and certain aspects of this information were incorporated into this note.    Electronically signed by: John Ibrahim M.D., 2/25/2025

## 2025-02-28 NOTE — Clinical Note
REFERRAL APPROVAL NOTICE         Sent on February 28, 2025                   Gladis Ball  [address] [facility]   [email or web address] [address]                   Dear Ms. Ball,    After a careful review of the medical information and benefit coverage, [facility] has processed your referral. See below for additional details.    If applicable, you must be actively enrolled with your insurance for coverage of the authorized service. If you have any questions regarding your coverage, please contact your insurance directly.    REFERRAL INFORMATION   Referral #:  [id number]  Referred-To Provider    Referred-By Provider:  Otolaryngology    John Ibrahim M.D.   [facility]      [address] [facility] [address]  [place] [address]  [phone] [address] [facility] [place] [address]  [phone]    Referral Start Date:  02/25/2025  Referral End Date:   02/25/2026             SCHEDULING  If you do not already have an appointment, please call [phone] to make an appointment.     MORE INFORMATION  If you do not already have a [employer] account, sign up at: [email or web address]  You can access your medical information, make appointments, see lab results, billing information, and more.  If you have questions regarding this referral, please contact  the [facility] Referrals department at:             [phone]. Monday - Friday 8:00AM - 5:00PM.     Sincerely,    [facility]

## 2025-03-17 DIAGNOSIS — I20.89 STABLE ANGINA (HCC): ICD-10-CM

## 2025-03-17 DIAGNOSIS — F17.200 TOBACCO DEPENDENCE: ICD-10-CM

## 2025-03-17 DIAGNOSIS — I48.91 ATRIAL FIBRILLATION, UNSPECIFIED TYPE (HCC): ICD-10-CM

## 2025-03-17 DIAGNOSIS — R94.39 ABNORMAL NUCLEAR STRESS TEST: ICD-10-CM

## 2025-03-18 RX ORDER — METOPROLOL SUCCINATE 50 MG/1
50 TABLET, EXTENDED RELEASE ORAL DAILY
Qty: 90 TABLET | Refills: 3 | Status: SHIPPED | OUTPATIENT
Start: 2025-03-18

## 2025-03-18 RX ORDER — ISOSORBIDE MONONITRATE 30 MG/1
30 TABLET, EXTENDED RELEASE ORAL EVERY MORNING
Qty: 90 TABLET | Refills: 3 | Status: SHIPPED | OUTPATIENT
Start: 2025-03-18

## 2025-03-18 RX ORDER — ATORVASTATIN CALCIUM 40 MG/1
40 TABLET, FILM COATED ORAL EVERY EVENING
Qty: 90 TABLET | Refills: 3 | Status: SHIPPED | OUTPATIENT
Start: 2025-03-18

## 2025-03-21 ENCOUNTER — OFFICE VISIT (OUTPATIENT)
Dept: CARDIOLOGY | Facility: MEDICAL CENTER | Age: 65
End: 2025-03-21
Attending: INTERNAL MEDICINE
Payer: COMMERCIAL

## 2025-03-21 VITALS
HEART RATE: 72 BPM | RESPIRATION RATE: 16 BRPM | OXYGEN SATURATION: 90 % | SYSTOLIC BLOOD PRESSURE: 126 MMHG | WEIGHT: 248.6 LBS | BODY MASS INDEX: 36.82 KG/M2 | HEIGHT: 69 IN | DIASTOLIC BLOOD PRESSURE: 80 MMHG

## 2025-03-21 DIAGNOSIS — I48.91 ATRIAL FIBRILLATION, UNSPECIFIED TYPE (HCC): ICD-10-CM

## 2025-03-21 DIAGNOSIS — G47.19 EXCESSIVE DAYTIME SLEEPINESS: ICD-10-CM

## 2025-03-21 LAB — EKG IMPRESSION: NORMAL

## 2025-03-21 PROCEDURE — 93005 ELECTROCARDIOGRAM TRACING: CPT | Mod: TC | Performed by: INTERNAL MEDICINE

## 2025-03-21 PROCEDURE — 99214 OFFICE O/P EST MOD 30 MIN: CPT | Performed by: INTERNAL MEDICINE

## 2025-03-21 PROCEDURE — 3074F SYST BP LT 130 MM HG: CPT | Performed by: INTERNAL MEDICINE

## 2025-03-21 PROCEDURE — 99213 OFFICE O/P EST LOW 20 MIN: CPT | Performed by: INTERNAL MEDICINE

## 2025-03-21 PROCEDURE — 3079F DIAST BP 80-89 MM HG: CPT | Performed by: INTERNAL MEDICINE

## 2025-03-21 ASSESSMENT — FIBROSIS 4 INDEX: FIB4 SCORE: 1.3

## 2025-03-21 NOTE — PROGRESS NOTES
Arrhythmia Clinic Note (Established patient)    DOS: 3/21/2025    Chief complaint/Reason for consult: F/u AF ablation    Interval History:  Pt is a 63 yo F. History of persistent AF. Refractory and recurrent after prior cardioversion. S/p AF ablation with us. Here for follow-up. Feeling better in sinus but says still some bad days, some good days. Bad days about 30%. Daytime fatigue main issue.     ROS (+in BOLD):  General-- fatigue, weight loss or weight gain  Cardiovascular-- CP, orthopnea, PND    Past Medical History:   Diagnosis Date    Anginal syndrome (HCC)     Atrial fibrillation (HCC)     Breath shortness     Bronchitis     Diabetes (HCC)     Prediabetes    Heart burn     Heart valve disease     High cholesterol     Hypertension     Myocardial infarct (HCC)     pt states cardiologist said she did not have a heart attack    Pneumonia     Snoring        Past Surgical History:   Procedure Laterality Date    APPENDECTOMY      BREAST BIOPSY Left     2 biopsies    OTHER CARDIAC SURGERY      cardioversion august 2024    TUBAL LIGATION         Social History     Socioeconomic History    Marital status: Single     Spouse name: Not on file    Number of children: Not on file    Years of education: Not on file    Highest education level: 12th grade   Occupational History    Not on file   Tobacco Use    Smoking status: Every Day     Current packs/day: 0.20     Types: Cigarettes    Smokeless tobacco: Never    Tobacco comments:     about 2 per day   Vaping Use    Vaping status: Former    Substances: Nicotine, Flavoring    Devices: Refillable tank   Substance and Sexual Activity    Alcohol use: Yes     Alcohol/week: 4.2 oz     Types: 7 Cans of beer per week    Drug use: Not Currently     Comment: denies    Sexual activity: Never     Birth control/protection: Post-Menopausal   Other Topics Concern     Service Not Asked    Blood Transfusions Not Asked    Caffeine Concern No    Occupational Exposure Not Asked    Hobby  Hazards Not Asked    Sleep Concern Yes    Stress Concern Not Asked    Weight Concern Yes    Special Diet No    Back Care Not Asked    Exercise No    Bike Helmet Not Asked    Seat Belt Not Asked    Self-Exams Not Asked   Social History Narrative    Not on file     Social Drivers of Health     Financial Resource Strain: Low Risk  (2/20/2025)    Overall Financial Resource Strain (CARDIA)     Difficulty of Paying Living Expenses: Not hard at all   Food Insecurity: No Food Insecurity (2/20/2025)    Hunger Vital Sign     Worried About Running Out of Food in the Last Year: Never true     Ran Out of Food in the Last Year: Never true   Transportation Needs: No Transportation Needs (2/20/2025)    PRAPARE - Transportation     Lack of Transportation (Medical): No     Lack of Transportation (Non-Medical): No   Physical Activity: Inactive (2/20/2025)    Exercise Vital Sign     Days of Exercise per Week: 0 days     Minutes of Exercise per Session: 0 min   Stress: Stress Concern Present (2/20/2025)    [employer] [place] [employer] - Occupational Stress Questionnaire     Feeling of Stress : To some extent   Social Connections: Unknown (2/20/2025)    Social Connection and Isolation Panel [NHANES]     Frequency of Communication with Friends and Family: Patient declined     Frequency of Social Gatherings with Friends and Family: Patient declined     Attends Buddhist Services: Patient declined     Active Member of Clubs or Organizations: No     Attends Club or Organization Meetings: Never     Marital Status:    Intimate Partner Violence: Not on file   Housing Stability: Unknown (2/20/2025)    Housing Stability Vital Sign     Unable to Pay for Housing in the Last Year: No     Number of Times Moved in the Last Year: Not on file     Homeless in the Last Year: No       Family History   Problem Relation Age of Onset    Diabetes Mother     Hypertension Father     Breast Cancer Maternal Aunt     Cancer Maternal Aunt          breast    Glaucoma Maternal Uncle     Breast Cancer Maternal Grandmother     Cancer Maternal Grandmother         breast    Heart Disease Paternal Grandmother        Allergies   Allergen Reactions    Codeine Itching     All over body , no rash       Current Outpatient Medications   Medication Sig Dispense Refill    atorvastatin (LIPITOR) 40 MG Tab TAKE 1 TABLET BY MOUTH EVERY  EVENING 90 Tablet 3    metoprolol SR (TOPROL XL) 50 MG TABLET SR 24 HR TAKE 1 TABLET BY MOUTH DAILY 90 Tablet 3    isosorbide mononitrate SR (IMDUR) 30 MG TABLET SR 24 HR TAKE 1 TABLET BY MOUTH EVERY  MORNING 90 Tablet 3    Empagliflozin (JARDIANCE) 10 MG Tab tablet Take 1 Tablet by mouth every day. (Patient taking differently: Take 10 mg by mouth every day.     MEDICATION INSTRUCTIONS FOR SURGERY ON 10/30 and 12/19: FOLLOW INSTRUCTIONS FROM SURGEON OR SPECIALIST.) 90 Tablet 3    furosemide (LASIX) 20 MG Tab Take 1 Tablet by mouth every day. (Patient taking differently: Take 20 mg by mouth every day.     DO NOT TAKE DAY OF SURGERY 10/30 and 12/19) 90 Tablet 3    potassium chloride SA (KDUR) 20 MEQ Tab CR Take 1 Tablet by mouth 2 times a day. (Patient taking differently: Take 20 mEq by mouth 2 times a day.       DO NOT TAKE DAY OF SURGERY 10/30 and 12/19) 180 Tablet 3    ELIQUIS 5 MG Tab TAKE 1 TABLET BY MOUTH TWICE  DAILY (Patient taking differently: Take 5 mg by mouth 2 times a day. MEDICATION INSTRUCTIONS FOR SURGERY ON 10/30 and 12/19: FOLLOW INSTRUCTIONS FROM SURGEON OR SPECIALIST.) 180 Tablet 3    amLODIPine (NORVASC) 10 MG Tab TAKE 1 TABLET BY MOUTH DAILY (Patient taking differently: Take 10 mg by mouth every morning.     MEDICATION INSTRUCTIONS FOR SURGERY/PROCEDURE SCHEDULED FOR 10/30 and 12/19   CONTINUE TAKING MED PRIOR TO SURGERY) 90 Tablet 3    buPROPion (WELLBUTRIN XL) 300 MG XL tablet Take 1 Tablet by mouth every morning. (Patient taking differently: Take 300 mg by mouth every morning.     EDICATION INSTRUCTIONS FOR  "SURGERY/PROCEDURE SCHEDULED FOR 10/30 and 12/19   CONTINUE TAKING MED PRIOR TO SURGERY) 90 Tablet 3    Cholecalciferol (VITAMIN D) 2000 UNIT Tab Take 4,000 Units by mouth every day.     MEDICATION INSTRUCTIONS FOR SURGERY/PROCEDURE SCHEDULED FOR 10/30 and 12/19   DO NOT TAKE 7 DAYS PRIOR TO SURGERY      Omega-3 1000 MG Cap Take 1,000 mg by mouth every day.     MEDICATION INSTRUCTIONS FOR SURGERY/PROCEDURE SCHEDULED FOR 10/30 and 12/19   DO NOT TAKE 7 DAYS PRIOR TO SURGERY       No current facility-administered medications for this visit.       Physical Exam:  Vitals:    03/21/25 1522   BP: 126/80   BP Location: Left arm   Patient Position: Sitting   BP Cuff Size: Adult   Pulse: 72   Resp: 16   SpO2: 90%   Weight: 113 kg (248 lb 9.6 oz)   Height: 1.753 m (5' 9\")     General appearance: NAD, conversant  HEENT: PERRL, neck is supple with FROM  Lungs: Clear to auscultation, normal respiratory effort  CV: RRR, no murmurs/rubs/gallops, no JVD  Abdomen: Soft, non-tender with normal bowel sounds  Extremities: No peripheral edema, no clubbing or cyanosis  Skin: No rash, lesions, or ulcers  Psych: Alert and oriented to person, place and time    Data:  Labs reviewed:  Mild polycythemia    EKG interpreted by me:  Sinus rhythm    Impression/Plan:  1. Atrial fibrillation, unspecified type (HCC)  EKG    OVERNIGHT PULSE OXIMETRY      2. Excessive daytime sleepiness          -Maintaining sinus rhythm  -Check OPO, if significant desats, refer for PSG  -F/u in 6 mo    Xin Huynh MD    "

## 2025-03-28 ENCOUNTER — APPOINTMENT (OUTPATIENT)
Dept: MEDICAL GROUP | Facility: MEDICAL CENTER | Age: 65
End: 2025-03-28
Payer: COMMERCIAL

## 2025-04-04 ENCOUNTER — APPOINTMENT (OUTPATIENT)
Dept: RADIOLOGY | Facility: MEDICAL CENTER | Age: 65
End: 2025-04-04
Attending: EMERGENCY MEDICINE
Payer: COMMERCIAL

## 2025-04-04 ENCOUNTER — HOSPITAL ENCOUNTER (EMERGENCY)
Facility: MEDICAL CENTER | Age: 65
End: 2025-04-04
Attending: EMERGENCY MEDICINE
Payer: COMMERCIAL

## 2025-04-04 VITALS
OXYGEN SATURATION: 93 % | DIASTOLIC BLOOD PRESSURE: 81 MMHG | BODY MASS INDEX: 40.57 KG/M2 | HEIGHT: 66 IN | WEIGHT: 252.43 LBS | RESPIRATION RATE: 15 BRPM | TEMPERATURE: 97.8 F | HEART RATE: 54 BPM | SYSTOLIC BLOOD PRESSURE: 143 MMHG

## 2025-04-04 DIAGNOSIS — R04.2 HEMOPTYSIS: ICD-10-CM

## 2025-04-04 LAB
ALBUMIN SERPL BCP-MCNC: 4 G/DL (ref 3.2–4.9)
ALBUMIN/GLOB SERPL: 1.2 G/DL
ALP SERPL-CCNC: 72 U/L (ref 30–99)
ALT SERPL-CCNC: 19 U/L (ref 2–50)
ANION GAP SERPL CALC-SCNC: 11 MMOL/L (ref 7–16)
AST SERPL-CCNC: 30 U/L (ref 12–45)
BASOPHILS # BLD AUTO: 0.8 % (ref 0–1.8)
BASOPHILS # BLD: 0.09 K/UL (ref 0–0.12)
BILIRUB SERPL-MCNC: 0.4 MG/DL (ref 0.1–1.5)
BUN SERPL-MCNC: 15 MG/DL (ref 8–22)
CALCIUM ALBUM COR SERPL-MCNC: 9.6 MG/DL (ref 8.5–10.5)
CALCIUM SERPL-MCNC: 9.6 MG/DL (ref 8.5–10.5)
CHLORIDE SERPL-SCNC: 105 MMOL/L (ref 96–112)
CO2 SERPL-SCNC: 22 MMOL/L (ref 20–33)
CREAT SERPL-MCNC: 0.93 MG/DL (ref 0.5–1.4)
D DIMER PPP IA.FEU-MCNC: <0.27 UG/ML (FEU) (ref 0–0.5)
EKG IMPRESSION: NORMAL
EOSINOPHIL # BLD AUTO: 0.21 K/UL (ref 0–0.51)
EOSINOPHIL NFR BLD: 1.9 % (ref 0–6.9)
ERYTHROCYTE [DISTWIDTH] IN BLOOD BY AUTOMATED COUNT: 52.5 FL (ref 35.9–50)
GFR SERPLBLD CREATININE-BSD FMLA CKD-EPI: 69 ML/MIN/1.73 M 2
GLOBULIN SER CALC-MCNC: 3.4 G/DL (ref 1.9–3.5)
GLUCOSE SERPL-MCNC: 107 MG/DL (ref 65–99)
HCT VFR BLD AUTO: 47.5 % (ref 37–47)
HGB BLD-MCNC: 15.3 G/DL (ref 12–16)
IMM GRANULOCYTES # BLD AUTO: 0.04 K/UL (ref 0–0.11)
IMM GRANULOCYTES NFR BLD AUTO: 0.4 % (ref 0–0.9)
LYMPHOCYTES # BLD AUTO: 2.13 K/UL (ref 1–4.8)
LYMPHOCYTES NFR BLD: 19.7 % (ref 22–41)
MCH RBC QN AUTO: 33.7 PG (ref 27–33)
MCHC RBC AUTO-ENTMCNC: 32.2 G/DL (ref 32.2–35.5)
MCV RBC AUTO: 104.6 FL (ref 81.4–97.8)
MONOCYTES # BLD AUTO: 1.15 K/UL (ref 0–0.85)
MONOCYTES NFR BLD AUTO: 10.6 % (ref 0–13.4)
NEUTROPHILS # BLD AUTO: 7.19 K/UL (ref 1.82–7.42)
NEUTROPHILS NFR BLD: 66.6 % (ref 44–72)
NRBC # BLD AUTO: 0 K/UL
NRBC BLD-RTO: 0 /100 WBC (ref 0–0.2)
NT-PROBNP SERPL IA-MCNC: 227 PG/ML (ref 0–125)
PLATELET # BLD AUTO: 216 K/UL (ref 164–446)
PMV BLD AUTO: 8.9 FL (ref 9–12.9)
POTASSIUM SERPL-SCNC: 4.4 MMOL/L (ref 3.6–5.5)
PROT SERPL-MCNC: 7.4 G/DL (ref 6–8.2)
RBC # BLD AUTO: 4.54 M/UL (ref 4.2–5.4)
SODIUM SERPL-SCNC: 138 MMOL/L (ref 135–145)
TROPONIN T SERPL-MCNC: 9 NG/L (ref 6–19)
WBC # BLD AUTO: 10.8 K/UL (ref 4.8–10.8)

## 2025-04-04 PROCEDURE — 36415 COLL VENOUS BLD VENIPUNCTURE: CPT

## 2025-04-04 PROCEDURE — 85379 FIBRIN DEGRADATION QUANT: CPT

## 2025-04-04 PROCEDURE — 71045 X-RAY EXAM CHEST 1 VIEW: CPT

## 2025-04-04 PROCEDURE — 83880 ASSAY OF NATRIURETIC PEPTIDE: CPT

## 2025-04-04 PROCEDURE — 93005 ELECTROCARDIOGRAM TRACING: CPT | Mod: TC | Performed by: EMERGENCY MEDICINE

## 2025-04-04 PROCEDURE — 93005 ELECTROCARDIOGRAM TRACING: CPT | Mod: TC

## 2025-04-04 PROCEDURE — 85025 COMPLETE CBC W/AUTO DIFF WBC: CPT

## 2025-04-04 PROCEDURE — 84484 ASSAY OF TROPONIN QUANT: CPT

## 2025-04-04 PROCEDURE — 99283 EMERGENCY DEPT VISIT LOW MDM: CPT

## 2025-04-04 PROCEDURE — 80053 COMPREHEN METABOLIC PANEL: CPT

## 2025-04-04 ASSESSMENT — FIBROSIS 4 INDEX: FIB4 SCORE: 1.3

## 2025-04-04 NOTE — ED TRIAGE NOTES
"Chief Complaint   Patient presents with    Blood in Sputum     Pt report coughing up blood started this morning. Per pt she was seen in the ED last month for the same complaint and was told to come back if it happens again. Hx of a-fib and takes Eliquis.     Chest Pain     Pt report mid sternal chest pressure/tightness.       Pt ambulatory to triage. Pt A&Ox4, for above complaint.     Pt to lobby. Pt educated on alerting staff in changes to condition. Pt verbalized understanding. Protocol initiated.     /85   Pulse 60   Temp 37 °C (98.6 °F) (Oral)   Resp 14   Ht 1.676 m (5' 6\")   Wt 115 kg (252 lb 6.8 oz)   SpO2 94%   BMI 40.74 kg/m²    "

## 2025-04-04 NOTE — ED PROVIDER NOTES
"ED Provider Note    CHIEF COMPLAINT  Chief Complaint   Patient presents with    Blood in Sputum     Pt report coughing up blood started this morning. Per pt she was seen in the ED last month for the same complaint and was told to come back if it happens again. Hx of a-fib and takes Eliquis.     Chest Pain     Pt report mid sternal chest pressure/tightness.       EXTERNAL RECORDS REVIEWED  External ED Note [facility] ER note reviewed from visit on 2/25/2025    HPI/ROS  LIMITATION TO HISTORY   Select: : None  OUTSIDE HISTORIAN(S):  None    Gladis Ball is a 64 y.o. female who presents to the [facility] with recurrent reported hemoptysis.  Past medical history as document below significant for A-fib on Eliquis.  She notes that she was seen a few weeks ago at [facility] for the same.  Workup there was benign.  She was told to follow-up with her PCP for possible ENT follow-up for further direct visualization of the structures as this may be the etiology of blood.  Patient has continued to feel well but today had recurrence of bloody sputum uptake which caused her concern as it is \"not normal \".  She had not followed up with ENT or PCP.  No new symptoms.    PAST MEDICAL HISTORY   has a past medical history of Anginal syndrome (HCC), Atrial fibrillation (HCC), Breath shortness, Bronchitis, Diabetes (HCC), Heart burn, Heart valve disease, High cholesterol, Hypertension, Myocardial infarct (HCC), Pneumonia, and Snoring.    SURGICAL HISTORY   has a past surgical history that includes appendectomy; tubal ligation; breast biopsy (Left); and other cardiac surgery.    FAMILY HISTORY  Family History   Problem Relation Age of Onset    Diabetes Mother     Hypertension Father     Breast Cancer Maternal Aunt     Cancer Maternal Aunt         breast    Glaucoma Maternal Uncle     Breast Cancer Maternal Grandmother     Cancer Maternal Grandmother         breast    Heart Disease Paternal Grandmother        SOCIAL " "HISTORY  Social History     Tobacco Use    Smoking status: Every Day     Current packs/day: 0.20     Types: Cigarettes    Smokeless tobacco: Never    Tobacco comments:     about 2 per day   Vaping Use    Vaping status: Former    Substances: Nicotine, Flavoring    Devices: Refillable tank   Substance and Sexual Activity    Alcohol use: Yes     Alcohol/week: 4.2 oz     Types: 7 Cans of beer per week    Drug use: Not Currently     Comment: denies    Sexual activity: Never     Birth control/protection: Post-Menopausal       CURRENT MEDICATIONS  Home Medications       Reviewed by Yovani Martel R.N. (Registered Nurse) on 04/04/25 at 0938  Med List Status: Partial     Medication Last Dose Status   amLODIPine (NORVASC) 10 MG Tab  Active   atorvastatin (LIPITOR) 40 MG Tab  Active   buPROPion (WELLBUTRIN XL) 300 MG XL tablet  Active   Cholecalciferol (VITAMIN D) 2000 UNIT Tab  Active   ELIQUIS 5 MG Tab  Active   Empagliflozin (JARDIANCE) 10 MG Tab tablet  Active   furosemide (LASIX) 20 MG Tab  Active   isosorbide mononitrate SR (IMDUR) 30 MG TABLET SR 24 HR  Active   metoprolol SR (TOPROL XL) 50 MG TABLET SR 24 HR  Active   Omega-3 1000 MG Cap  Active   potassium chloride SA (KDUR) 20 MEQ Tab CR  Active                    ALLERGIES  Allergies   Allergen Reactions    Codeine Itching     All over body , no rash       PHYSICAL EXAM  VITAL SIGNS: BP (!) 143/81   Pulse (!) 54   Temp 36.6 °C (97.8 °F) (Temporal)   Resp 15   Ht 1.676 m (5' 6\")   Wt 115 kg (252 lb 6.8 oz)   SpO2 93%   BMI 40.74 kg/m²      Pulse ox interpretation: I interpret this pulse ox as normal.  Constitutional: Alert in no apparent distress.  HENT: No signs of trauma, Bilateral external ears normal, Nose normal; no dermal irritation or dried blood.  Posterior pharynx also clear without acute tissue abnormalities.  No blood noted.  Eyes: Pupils are equal and reactive  Neck: Normal range of motion, No tenderness, Supple  Cardiovascular: Irregularly " irregular  Thorax & Lungs: Normal breath sounds, No respiratory distress,  Skin: Warm, Dry, No erythema, No rash.   Musculoskeletal: Good range of motion in all major joints. No tenderness to palpation or major deformities noted.   Neurologic: Alert , Normal motor function, Normal sensory function, No focal deficits noted.   Psychiatric: Affect normal, Judgment normal, Mood normal.         EKG/LABS  Results for orders placed or performed during the hospital encounter of 04/04/25   CBC with Differential    Collection Time: 04/04/25  9:49 AM   Result Value Ref Range    WBC 10.8 4.8 - 10.8 K/uL    RBC 4.54 4.20 - 5.40 M/uL    Hemoglobin 15.3 12.0 - 16.0 g/dL    Hematocrit 47.5 (H) 37.0 - 47.0 %    .6 (H) 81.4 - 97.8 fL    MCH 33.7 (H) 27.0 - 33.0 pg    MCHC 32.2 32.2 - 35.5 g/dL    RDW 52.5 (H) 35.9 - 50.0 fL    Platelet Count 216 164 - 446 K/uL    MPV 8.9 (L) 9.0 - 12.9 fL    Neutrophils-Polys 66.60 44.00 - 72.00 %    Lymphocytes 19.70 (L) 22.00 - 41.00 %    Monocytes 10.60 0.00 - 13.40 %    Eosinophils 1.90 0.00 - 6.90 %    Basophils 0.80 0.00 - 1.80 %    Immature Granulocytes 0.40 0.00 - 0.90 %    Nucleated RBC 0.00 0.00 - 0.20 /100 WBC    Neutrophils (Absolute) 7.19 1.82 - 7.42 K/uL    Lymphs (Absolute) 2.13 1.00 - 4.80 K/uL    Monos (Absolute) 1.15 (H) 0.00 - 0.85 K/uL    Eos (Absolute) 0.21 0.00 - 0.51 K/uL    Baso (Absolute) 0.09 0.00 - 0.12 K/uL    Immature Granulocytes (abs) 0.04 0.00 - 0.11 K/uL    NRBC (Absolute) 0.00 K/uL   Complete Metabolic Panel (CMP)    Collection Time: 04/04/25  9:49 AM   Result Value Ref Range    Sodium 138 135 - 145 mmol/L    Potassium 4.4 3.6 - 5.5 mmol/L    Chloride 105 96 - 112 mmol/L    Co2 22 20 - 33 mmol/L    Anion Gap 11.0 7.0 - 16.0    Glucose 107 (H) 65 - 99 mg/dL    Bun 15 8 - 22 mg/dL    Creatinine 0.93 0.50 - 1.40 mg/dL    Calcium 9.6 8.5 - 10.5 mg/dL    Correct Calcium 9.6 8.5 - 10.5 mg/dL    AST(SGOT) 30 12 - 45 U/L    ALT(SGPT) 19 2 - 50 U/L    Alkaline  Phosphatase 72 30 - 99 U/L    Total Bilirubin 0.4 0.1 - 1.5 mg/dL    Albumin 4.0 3.2 - 4.9 g/dL    Total Protein 7.4 6.0 - 8.2 g/dL    Globulin 3.4 1.9 - 3.5 g/dL    A-G Ratio 1.2 g/dL   proBrain Natriuretic Peptide, NT (BNP)    Collection Time: 25  9:49 AM   Result Value Ref Range    NT-proBNP 227 (H) 0 - 125 pg/mL   Troponins NOW    Collection Time: 25  9:49 AM   Result Value Ref Range    Troponin T 9 6 - 19 ng/L   ESTIMATED GFR    Collection Time: 25  9:49 AM   Result Value Ref Range    GFR (CKD-EPI) 69 >60 mL/min/1.73 m 2   D-DIMER    Collection Time: 25  9:49 AM   Result Value Ref Range    D-Dimer <0.27 0.00 - 0.50 ug/mL (FEU)   EKG    Collection Time: 25 11:15 AM   Result Value Ref Range    Report       [facility]    Test Date:  2025  Pt Name:    ISABELLA FALCON               Department: ER  MRN:        [id number]                      Room:  Gender:     Female                       Technician: [id number]  :        [id number]                   Requested By:ER TRIAGE PROTOCOL  Order #:    [id number]                    Reading MD: Ángel Arreaga    Measurements  Intervals                                Axis  Rate:       52                           P:          -21  OR:         225                          QRS:        65  QRSD:       95                           T:          32  QT:         431  QTc:        401    Interpretive Statements  Sinus bradycardia  Prolonged OR interval  Compared to ECG 2025 15:30:10  First degree AV block now present  Electronically Signed On 2025 11:15:07 PDT by Ángel Arreaga         I have independently interpreted this EKG    RADIOLOGY/PROCEDURES   I have independently interpreted the diagnostic imaging associated with this visit and am waiting the final reading from the radiologist.   My preliminary interpretation is as follows: No consolidative process or mass noted  Radiologist  interpretation:  DX-CHEST-PORTABLE (1 VIEW)   Final Result      No acute cardiac or pulmonary abnormalities are identified.          COURSE & MEDICAL DECISION MAKING    ASSESSMENT, COURSE AND PLAN  Care Narrative: 64-year-old female presenting to the emergency department with above presentation.  Will repeat labs and x-ray imaging today.  Additionally will add D-dimer    DISPOSITION AND DISCUSSIONS  I have discussed management of the patient with the following physicians and ERIK's: None    Discussion of management with other QHP or appropriate source(s): None     Escalation of care considered, and ultimately not performed:acute inpatient care management, however at this time, the patient is most appropriate for outpatient management    64-year-old female presented emerged part with above presentation.  No recurrence of hemoptysis while here.  Labs are reassuring.  No anemia or platelet derangement.  Chest x-ray also negative.  Given lack of obvious source I will defer further workup to include referral to ENT as previously discussed but also to pulmonology.  D-dimer negative.  I continue have low concern for thromboembolic phenomenon.  Patient will continue home monitoring and return precautions are understood.    FINAL DIAGNOSIS  1. Hemoptysis         Electronically signed by: Ángel Arreaga M.D., 4/4/2025 10:25 AM

## 2025-04-07 ENCOUNTER — TELEPHONE (OUTPATIENT)
Dept: SCHEDULING | Facility: IMAGING CENTER | Age: 65
End: 2025-04-07
Payer: COMMERCIAL

## 2025-04-07 DIAGNOSIS — R04.2 HEMOPTYSIS: ICD-10-CM

## 2025-04-07 NOTE — TELEPHONE ENCOUNTER
I placed an urgent referral to pulmonary.   However, it can take months to be seen with pulmonary. Please notify pt that I would like to see her in the meantime about this condition.

## 2025-04-07 NOTE — Clinical Note
REFERRAL APPROVAL NOTICE         Sent on April 7, 2025                   Gladis Ball  [address] [place] [address]   [place] [address]                   Dear Ms. Ball,    After a careful review of the medical information and benefit coverage, [facility] has processed your referral. See below for additional details.    If applicable, you must be actively enrolled with your insurance for coverage of the authorized service. If you have any questions regarding your coverage, please contact your insurance directly.    REFERRAL INFORMATION   Referral #:  [id number]  Referred-To Department    Referred-By Provider:  Pulmonary and Sleep Medicine    SHA Roberson   [place] [facility]      [address]  [address]  [place] [id number] [phone] [address], [address]  [place] [address] [phone]    Referral Start Date:  04/07/2025  Referral End Date:   04/07/2026           SCHEDULING  If you do not already have an appointment, please call [phone] to make an appointment.   MORE INFORMATION  As a reminder, [facility] ownership has changed, meaning this location is now owned and operated by [facility]. As such, we want to clarify that our patients should expect to receive two separate bills for the services received at [facility] - one representing the [facility] facility fees as the owner of the establishment, and the other to represent the physician's services and subsequent fees. You can speak with your insurance carrier for a pricing estimate by calling the customer service number on the back of your card and ask about charges for a hospital outpatient visit.  If you do not already have a [employer] account, sign up at: [email or web address]  You can access your medical information, make appointments, see lab results,  billing information, and more.  If you have questions regarding this referral, please contact  the [facility] department at:             [phone]. Monday - Friday 7:30AM - 5:00PM.      Sincerely,  [facility]

## 2025-04-07 NOTE — TELEPHONE ENCOUNTER
Patient called to check the status of referral to Pulmology. Patient was seen in at [facility] ED on 4/4/2025 and was advised to make an appt with Pulmonology and ENT but no referrals were created by the ED provider. She stated that she is already est with an ENT so no referral is needed. Patient stated that ENT will not see her until she sees Pulm.     Madalyn Kerns,   Can you please order a referral to pulmonology if appropriate?     DX CODE- R04.2 - Hemoptysis     Thanks

## 2025-04-20 LAB — EKG IMPRESSION: NORMAL

## 2025-04-21 ENCOUNTER — HOSPITAL ENCOUNTER (OUTPATIENT)
Dept: CARDIOLOGY | Facility: MEDICAL CENTER | Age: 65
End: 2025-04-21
Attending: NURSE PRACTITIONER
Payer: COMMERCIAL

## 2025-04-21 DIAGNOSIS — I34.0 MODERATE MITRAL REGURGITATION: Chronic | ICD-10-CM

## 2025-04-21 PROCEDURE — 93306 TTE W/DOPPLER COMPLETE: CPT

## 2025-04-22 LAB
LV EJECT FRACT MOD 2C 99903: 63.29
LV EJECT FRACT MOD 4C 99902: 61.63
LV EJECT FRACT MOD BP 99901: 62.37

## 2025-04-22 PROCEDURE — 93306 TTE W/DOPPLER COMPLETE: CPT | Mod: 26 | Performed by: STUDENT IN AN ORGANIZED HEALTH CARE EDUCATION/TRAINING PROGRAM

## 2025-04-23 ENCOUNTER — RESULTS FOLLOW-UP (OUTPATIENT)
Dept: CARDIOLOGY | Facility: MEDICAL CENTER | Age: 65
End: 2025-04-23
Payer: COMMERCIAL

## 2025-05-14 DIAGNOSIS — I48.19 PERSISTENT ATRIAL FIBRILLATION (HCC): ICD-10-CM

## 2025-05-15 RX ORDER — APIXABAN 5 MG/1
5 TABLET, FILM COATED ORAL 2 TIMES DAILY
Qty: 180 TABLET | Refills: 3 | Status: SHIPPED | OUTPATIENT
Start: 2025-05-15

## 2025-05-24 DIAGNOSIS — I10 ESSENTIAL HYPERTENSION: ICD-10-CM

## 2025-05-27 ENCOUNTER — OFFICE VISIT (OUTPATIENT)
Dept: MEDICAL GROUP | Facility: MEDICAL CENTER | Age: 65
End: 2025-05-27
Payer: COMMERCIAL

## 2025-05-27 VITALS
TEMPERATURE: 98 F | DIASTOLIC BLOOD PRESSURE: 78 MMHG | SYSTOLIC BLOOD PRESSURE: 114 MMHG | BODY MASS INDEX: 40.07 KG/M2 | HEART RATE: 77 BPM | WEIGHT: 249.34 LBS | RESPIRATION RATE: 15 BRPM | OXYGEN SATURATION: 92 % | HEIGHT: 66 IN

## 2025-05-27 DIAGNOSIS — R73.03 PREDIABETES: ICD-10-CM

## 2025-05-27 DIAGNOSIS — F17.200 TOBACCO DEPENDENCE: ICD-10-CM

## 2025-05-27 DIAGNOSIS — R71.8 ELEVATED MCV: ICD-10-CM

## 2025-05-27 DIAGNOSIS — Z12.11 COLON CANCER SCREENING: ICD-10-CM

## 2025-05-27 DIAGNOSIS — Z79.01 CHRONIC ANTICOAGULATION: ICD-10-CM

## 2025-05-27 DIAGNOSIS — K21.9 GASTROESOPHAGEAL REFLUX DISEASE, UNSPECIFIED WHETHER ESOPHAGITIS PRESENT: ICD-10-CM

## 2025-05-27 DIAGNOSIS — R04.2 HEMOPTYSIS: Primary | ICD-10-CM

## 2025-05-27 DIAGNOSIS — R05.3 CHRONIC COUGH: ICD-10-CM

## 2025-05-27 DIAGNOSIS — Z12.31 ENCOUNTER FOR SCREENING MAMMOGRAM FOR MALIGNANT NEOPLASM OF BREAST: ICD-10-CM

## 2025-05-27 PROBLEM — K21.00 GASTROESOPHAGEAL REFLUX DISEASE WITH ESOPHAGITIS: Status: ACTIVE | Noted: 2025-05-27

## 2025-05-27 PROBLEM — R07.89 ATYPICAL CHEST PAIN: Status: RESOLVED | Noted: 2024-05-21 | Resolved: 2025-05-27

## 2025-05-27 PROBLEM — R21 RASH: Status: RESOLVED | Noted: 2022-07-21 | Resolved: 2025-05-27
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
## 2025-05-27 RX ORDER — OMEPRAZOLE 20 MG/1
20 CAPSULE, DELAYED RELEASE ORAL DAILY
Qty: 90 CAPSULE | Refills: 0 | Status: SHIPPED | OUTPATIENT
Start: 2025-06-27

## 2025-05-27 RX ORDER — OMEPRAZOLE 40 MG/1
40 CAPSULE, DELAYED RELEASE ORAL DAILY
Qty: 30 CAPSULE | Refills: 0 | Status: SHIPPED | OUTPATIENT
Start: 2025-05-27

## 2025-05-27 RX ORDER — AMLODIPINE BESYLATE 10 MG/1
10 TABLET ORAL DAILY
Qty: 90 TABLET | Refills: 1 | Status: SHIPPED | OUTPATIENT
Start: 2025-05-27

## 2025-05-27 ASSESSMENT — FIBROSIS 4 INDEX: FIB4 SCORE: 1.98

## 2025-05-27 ASSESSMENT — PATIENT HEALTH QUESTIONNAIRE - PHQ9: CLINICAL INTERPRETATION OF PHQ2 SCORE: 0

## 2025-05-27 NOTE — PROGRESS NOTES
Subjective:   Verbal consent was acquired by the patient to use [employer] ambient listening note generation during this visit Yes    CC:    Chief Complaint   Patient presents with    Follow-Up     Ed f/u cough and bleeding   Depends on the chest pain and little   Imaging consult x-ray  4/4/25          HISTORY OF THE PRESENT ILLNESS:   Gladis presents today   History of Present Illness  The patient is a 64-year-old female who came in to talk about coughing up blood and acid reflux.    She has been to the emergency room twice, most recently on April 4th, 2025, because she was coughing up blood and had chest pain. She feels like there's something heavy in her lungs that makes her cough and gag. She only sees blood when she gags, not with regular coughing. She also has a general cough that sometimes brings up mucus. She noticed the blood was redder and there was more of it on May 23rd, 2025. She had two bad episodes of coughing up blood in the past week, with the last one on May 21st, 2025. She was referred to a lung specialist, but they can't see her for 6 months. She saw an ENT doctor on April 4th, 2025, who checked her nose and throat with a camera and said everything looked fine. She was told to see a lung specialist. She gets a TB test every year at [facility] for her job. She is on blood thinners and thinks her symptoms might be related to a throat irritation from a previous ablation procedure. She talked to her cardiologist  who suggested seeing an ENT and a lung specialist. She smokes cigarettes.    She has been having acid reflux, which is new for her. She drinks milk at night to help with the burning feeling. She thinks certain foods like spaghetti sauce and salad might be causing it and wonders if her medications could be making it worse. These symptoms started after her ablation procedure.    She is prediabetic and her blood sugars have been okay. She is interested in trying Mounjaro.    She hasn't had a  sleep study yet, even though she tried to schedule it three times. She saw a sleep medicine specialist at [facility] who recommended a sleep study, but it was canceled three times. Dr. Deng had ordered an overnight pulse oximeter for her.    She needs to get a mammogram and a colon cancer screening. She didn't do well with Cologuard. She also needs a Pap smear.    PAST SURGICAL HISTORY in past year:  - Ablation procedure  - SHELLIE    SOCIAL HISTORY  She admits to smoking. She drinks whiskey on the rocks every night, equivalent to maybe two shots or more.      Current Outpatient Medications   Medication Sig    amLODIPine (NORVASC) 10 MG Tab TAKE 1 TABLET BY MOUTH DAILY    omeprazole (PRILOSEC) 40 MG delayed-release capsule Take 1 Capsule by mouth every day.    [START ON 6/27/2025] omeprazole (PRILOSEC) 20 MG delayed-release capsule Take 1 Capsule by mouth every day.    ELIQUIS 5 MG Tab TAKE 1 TABLET BY MOUTH TWICE  DAILY    atorvastatin (LIPITOR) 40 MG Tab TAKE 1 TABLET BY MOUTH EVERY  EVENING    metoprolol SR (TOPROL XL) 50 MG TABLET SR 24 HR TAKE 1 TABLET BY MOUTH DAILY    isosorbide mononitrate SR (IMDUR) 30 MG TABLET SR 24 HR TAKE 1 TABLET BY MOUTH EVERY  MORNING    Empagliflozin (JARDIANCE) 10 MG Tab tablet Take 1 Tablet by mouth every day.    furosemide (LASIX) 20 MG Tab Take 1 Tablet by mouth every day.    potassium chloride SA (KDUR) 20 MEQ Tab CR Take 1 Tablet by mouth 2 times a day.    buPROPion (WELLBUTRIN XL) 300 MG XL tablet Take 1 Tablet by mouth every morning.    Cholecalciferol (VITAMIN D) 2000 UNIT Tab Take 4,000 Units by mouth every day.     MEDICATION INSTRUCTIONS FOR SURGERY/PROCEDURE SCHEDULED FOR 10/30 and 12/19   DO NOT TAKE 7 DAYS PRIOR TO SURGERY    Omega-3 1000 MG Cap Take 1,000 mg by mouth every day.     MEDICATION INSTRUCTIONS FOR SURGERY/PROCEDURE SCHEDULED FOR 10/30 and 12/19   DO NOT TAKE 7 DAYS PRIOR TO SURGERY          ROS: See HPI        Objective:     Exam: /78 (BP Location: Left  "arm, Patient Position: Sitting, BP Cuff Size: Adult)   Pulse 77   Temp 36.7 °C (98 °F) (Temporal)   Resp 15   Ht 1.676 m (5' 6\")   Wt 113 kg (249 lb 5.4 oz)   SpO2 92%   BMI 40.24 kg/m²   Body mass index is 40.24 kg/m².    Physical Exam  Constitutional:       Appearance: Normal appearance.   HENT:      Mouth/Throat:      Pharynx: Posterior oropharyngeal erythema present.   Cardiovascular:      Rate and Rhythm: Normal rate and regular rhythm.      Pulses: Normal pulses.      Heart sounds: Normal heart sounds.   Pulmonary:      Effort: Pulmonary effort is normal.      Breath sounds: Normal breath sounds.   Musculoskeletal:      Cervical back: Normal range of motion and neck supple.   Neurological:      Mental Status: She is alert.         Hospital Outpatient Visit on 2025   Component Date Value Ref Range Status    Eject.Frac. MOD BP 2025 62.37   Final    Eject.Frac. MOD 4C 2025 61.63   Final    Eject.Frac. MOD 2C 2025 63.29   Final   Admission on 2025, Discharged on 2025   Component Date Value Ref Range Status    Report 2025    Final                    Value:[facility] Emergency [facility]    Test Date:  2025  Pt Name:    ISABELLA FALCON               Department: ER  MRN:        [id number]                      Room:  Gender:     Female                       Technician: [id number]  :        1960                   Requested By:ER TRIAGE PROTOCOL  Order #:    [id number]                    Reading MD: Ángel Arreaga    Measurements  Intervals                                Axis  Rate:       52                           P:          -21  TN:         225                          QRS:        65  QRSD:       95                           T:          32  QT:         431  QTc:        401    Interpretive Statements  Sinus bradycardia  Prolonged TN interval  Compared to ECG 2025 15:30:10  First degree AV block now present  Electronically Signed On 2025 " 11:15:07 PDT by Ángel Arreaga      WBC 04/04/2025 10.8  4.8 - 10.8 K/uL Final    RBC 04/04/2025 4.54  4.20 - 5.40 M/uL Final    Hemoglobin 04/04/2025 15.3  12.0 - 16.0 g/dL Final    Hematocrit 04/04/2025 47.5 (H)  37.0 - 47.0 % Final    MCV 04/04/2025 104.6 (H)  81.4 - 97.8 fL Final    MCH 04/04/2025 33.7 (H)  27.0 - 33.0 pg Final    MCHC 04/04/2025 32.2  32.2 - 35.5 g/dL Final    RDW 04/04/2025 52.5 (H)  35.9 - 50.0 fL Final    Platelet Count 04/04/2025 216  164 - 446 K/uL Final    MPV 04/04/2025 8.9 (L)  9.0 - 12.9 fL Final    Neutrophils-Polys 04/04/2025 66.60  44.00 - 72.00 % Final    Lymphocytes 04/04/2025 19.70 (L)  22.00 - 41.00 % Final    Monocytes 04/04/2025 10.60  0.00 - 13.40 % Final    Eosinophils 04/04/2025 1.90  0.00 - 6.90 % Final    Basophils 04/04/2025 0.80  0.00 - 1.80 % Final    Immature Granulocytes 04/04/2025 0.40  0.00 - 0.90 % Final    Nucleated RBC 04/04/2025 0.00  0.00 - 0.20 /100 WBC Final    Neutrophils (Absolute) 04/04/2025 7.19  1.82 - 7.42 K/uL Final    Includes immature neutrophils, if present.    Lymphs (Absolute) 04/04/2025 2.13  1.00 - 4.80 K/uL Final    Monos (Absolute) 04/04/2025 1.15 (H)  0.00 - 0.85 K/uL Final    Eos (Absolute) 04/04/2025 0.21  0.00 - 0.51 K/uL Final    Baso (Absolute) 04/04/2025 0.09  0.00 - 0.12 K/uL Final    Immature Granulocytes (abs) 04/04/2025 0.04  0.00 - 0.11 K/uL Final    NRBC (Absolute) 04/04/2025 0.00  K/uL Final    Sodium 04/04/2025 138  135 - 145 mmol/L Final    Potassium 04/04/2025 4.4  3.6 - 5.5 mmol/L Final    Comment: The hemolysis index of the specimen exceeds the allowed tolerance for the  test. Result may be affected.?Specimen recollection is recommended to  confirm the result.      Chloride 04/04/2025 105  96 - 112 mmol/L Final    Co2 04/04/2025 22  20 - 33 mmol/L Final    Anion Gap 04/04/2025 11.0  7.0 - 16.0 Final    Glucose 04/04/2025 107 (H)  65 - 99 mg/dL Final    Bun 04/04/2025 15  8 - 22 mg/dL Final    Creatinine 04/04/2025 0.93  0.50  - 1.40 mg/dL Final    Calcium 04/04/2025 9.6  8.5 - 10.5 mg/dL Final    Correct Calcium 04/04/2025 9.6  8.5 - 10.5 mg/dL Final    AST(SGOT) 04/04/2025 30  12 - 45 U/L Final    Comment: The hemolysis index of the specimen exceeds the allowed tolerance for the  test. Result may be affected.?Specimen recollection is recommended to  confirm the result.      ALT(SGPT) 04/04/2025 19  2 - 50 U/L Final    Alkaline Phosphatase 04/04/2025 72  30 - 99 U/L Final    Total Bilirubin 04/04/2025 0.4  0.1 - 1.5 mg/dL Final    Albumin 04/04/2025 4.0  3.2 - 4.9 g/dL Final    Total Protein 04/04/2025 7.4  6.0 - 8.2 g/dL Final    Globulin 04/04/2025 3.4  1.9 - 3.5 g/dL Final    A-G Ratio 04/04/2025 1.2  g/dL Final    NT-proBNP 04/04/2025 227 (H)  0 - 125 pg/mL Final    Troponin T 04/04/2025 9  6 - 19 ng/L Final    Comment: Biotin intake of greater than 5 mg per day may interfere with  troponin levels, causing false low values.    The Ultra High Sensitivity Troponin T test has a reference range  for positive troponins that follows the recommendation of ACC for  the 99th percentile reference population.      GFR (CKD-EPI) 04/04/2025 69  >60 mL/min/1.73 m 2 Final    Comment: Estimated Glomerular Filtration Rate is calculated using  race neutral CKD-EPI 2021 equation per NKF-ASN recommendations.      D-Dimer 04/04/2025 <0.27  0.00 - 0.50 ug/mL (FEU) Final    Comment: A negative D-Dimer result when combined with a clinical  assessment of low probability has been shown to have a high  negative predictive value for DVT or PE.    This assay should not be used independently to exclude or  diagnose DVT or Pulmonary Embolism.    This test methodology does not differentiate between DVT and  PE when an elevation in results is demonstrated.     Office Visit on 03/21/2025   Component Date Value Ref Range Status    Report 03/21/2025    Preliminary                    Value:[facility]    Test Date:  2025-03-21  Pt Name:    ISABELLA FALCON                [facility]  MRN:        [id number]                      Room:  Gender:     Female                       Technician: KATLIN  :        1960                   Requested By:CHRISSY SENA  Order #:    [id number]                    Reading MD:    Measurements  Intervals                                Axis  Rate:       72                           P:          31  IA:         193                          QRS:        59  QRSD:       98                           T:          26  QT:         410  QTc:        449    Interpretive Statements  Sinus rhythm  Compared to ECG 01/10/2025 12:54:29  T-wave abnormality no longer present     Admission on 2025, Discharged on 2025   Component Date Value Ref Range Status    WBC 2025 10.0  4.8 - 10.8 K/uL Final    RBC 2025 4.54  4.20 - 5.40 M/uL Final    Hemoglobin 2025 15.5  12.0 - 16.0 g/dL Final    Hematocrit 2025 47.4 (H)  37.0 - 47.0 % Final    MCV 2025 104.4 (H)  81.4 - 97.8 fL Final    MCH 2025 34.1 (H)  27.0 - 33.0 pg Final    MCHC 2025 32.7  32.2 - 35.5 g/dL Final    RDW 2025 54.1 (H)  35.9 - 50.0 fL Final    Platelet Count 2025 268  164 - 446 K/uL Final    MPV 2025 9.1  9.0 - 12.9 fL Final    Neutrophils-Polys 2025 64.00  44.00 - 72.00 % Final    Lymphocytes 2025 22.50  22.00 - 41.00 % Final    Monocytes 2025 9.80  0.00 - 13.40 % Final    Eosinophils 2025 2.30  0.00 - 6.90 % Final    Basophils 2025 0.90  0.00 - 1.80 % Final    Immature Granulocytes 2025 0.50  0.00 - 0.90 % Final    Nucleated RBC 2025 0.00  0.00 - 0.20 /100 WBC Final    Neutrophils (Absolute) 2025 6.42  1.82 - 7.42 K/uL Final    Includes immature neutrophils, if present.    Lymphs (Absolute) 2025 2.26  1.00 - 4.80 K/uL Final    Monos (Absolute) 2025 0.98 (H)  0.00 - 0.85 K/uL Final    Eos (Absolute) 2025 0.23  0.00 - 0.51 K/uL Final    Baso (Absolute) 2025  0.09  0.00 - 0.12 K/uL Final    Immature Granulocytes (abs) 2025 0.05  0.00 - 0.11 K/uL Final    NRBC (Absolute) 2025 0.00  K/uL Final    Sodium 2025 138  135 - 145 mmol/L Final    Potassium 2025 4.2  3.6 - 5.5 mmol/L Final    Chloride 2025 106  96 - 112 mmol/L Final    Co2 2025 22  20 - 33 mmol/L Final    Anion Gap 2025 10.0  7.0 - 16.0 Final    Glucose 2025 106 (H)  65 - 99 mg/dL Final    Bun 2025 18  8 - 22 mg/dL Final    Creatinine 2025 0.86  0.50 - 1.40 mg/dL Final    Calcium 2025 9.7  8.4 - 10.2 mg/dL Final    Correct Calcium 2025 9.7  8.5 - 10.5 mg/dL Final    AST(SGOT) 2025 21  12 - 45 U/L Final    ALT(SGPT) 2025 14  2 - 50 U/L Final    Alkaline Phosphatase 2025 74  30 - 99 U/L Final    Total Bilirubin 2025 0.5  0.1 - 1.5 mg/dL Final    Albumin 2025 4.0  3.2 - 4.9 g/dL Final    Total Protein 2025 7.3  6.0 - 8.2 g/dL Final    Globulin 2025 3.3  1.9 - 3.5 g/dL Final    A-G Ratio 2025 1.2  g/dL Final    APTT 2025 30.2  24.7 - 36.0 sec Final    PT 2025 17.5 (H)  12.0 - 14.6 sec Final    INR 2025 1.39 (H)  0.87 - 1.13 Final    Comment: Reference range:  INR - Non-therapeutic Reference Range: 0.87-1.13  INR - Therapeutic Reference Range: 2.0-4.0      GFR (CKD-EPI) 2025 76  >60 mL/min/1.73 m 2 Final    Comment: Estimated Glomerular Filtration Rate is calculated using  race neutral CKD-EPI  equation per NKF-ASN recommendations.     Office Visit on 01/10/2025   Component Date Value Ref Range Status    Report 2025    Final                    Value:[facility]    Test Date:  2025-01-10  Pt Name:    ISABELLA FALCON               Department: [facility]  MRN:        [id number]                      Room:  Gender:     Female                       Technician: KATLIN  :        1960                   Requested By:Xin Huynh MD  Order #:    [id number]                     Reading MD: Xin Sena MD    Measurements  Intervals                                Axis  Rate:       71                           P:          47  MT:         202                          QRS:        44  QRSD:       93                           T:          30  QT:         390  QTc:        424    Interpretive Statements  Sinus rhythm  Low voltage, precordial leads  Borderline T wave abnormalities  Compared to ECG 2024 10:14:40  No significant changes  Electronically Signed On 2025 00:27:30 PDT by Xni Sena MD     Admission on 2024, Discharged on 2024   Component Date Value Ref Range Status    Left Ventrical Ejection Fraction 2024 60   Final    Istat Activated Clotting Time 2024 418 (H)  74 - 137 sec Final    Comment: Non-therapeutic reference range:  74 - 137 seconds    Therapeutic range:  >300 seconds  For Cardiac Catheterization, Carotid Stent,  and Cardiopulmonary Bypass Procedures    Target value to discontinue sheath:  <170 seconds  For Post Cardiac Cath/Interventional Procedures      Report 2024    Final                    Value:[facility]    Test Date:  2024  Pt Name:    ISABELLA FALCON               Department: [facility]  MRN:        [id number]                      Room:       [facility]  Gender:     Female                       Technician: TRISTIN  :        1960                   Requested By:XIN SENA  Order #:    [id number]                    Reading MD: Lai Ramírez MD    Measurements  Intervals                                Axis  Rate:       71                           P:          25  MT:         215                          QRS:        41  QRSD:       104                          T:          28  QT:         429  QTc:        467    Interpretive Statements  Sinus rhythm  prolonged MT interval  LATE PRECORDIAL R/S TRANSITION  Prolonged QTc  Electronically Signed On 2024 11:00:26 PST by Lai Ramírez MD     Pre-Admission Testing on  2024   Component Date Value Ref Range Status    Report 2024    Final                    Value:[facility]    Test Date:  2024  Pt Name:    ISABELLA FALCON               Department: [facility]  MRN:        [id number]                      Room:  Gender:     Female                       Technician: RASHAD  :        1960                   Requested By:CHRISSY SENA  Order #:    [id number]                    Reading MD: Maryse Malik MD    Measurements  Intervals                                Axis  Rate:       55                           P:          22  LA:         214                          QRS:        30  QRSD:       93                           T:          24  QT:         420  QTc:        402    Interpretive Statements  Sinus bradycardia  Borderline prolonged LA interval  Compared to ECG 10/30/2024 10:07:57  No significant changes  Electronically Signed On 2024 10:13:35 PST by Maryse Malik MD      PT 2024 15.6 (H)  12.0 - 14.6 sec Final    INR 2024 1.24 (H)  0.87 - 1.13 Final    Comment: INR - Non-therapeutic Reference Range: 0.87-1.13  INR - Therapeutic Reference Range: 2.0-4.0      WBC 2024 11.1 (H)  4.8 - 10.8 K/uL Final    RBC 2024 4.83  4.20 - 5.40 M/uL Final    Hemoglobin 2024 16.0  12.0 - 16.0 g/dL Final    Hematocrit 2024 48.4 (H)  37.0 - 47.0 % Final    MCV 2024 100.2 (H)  81.4 - 97.8 fL Final    MCH 2024 33.1 (H)  27.0 - 33.0 pg Final    MCHC 2024 33.1  32.2 - 35.5 g/dL Final    RDW 2024 53.2 (H)  35.9 - 50.0 fL Final    Platelet Count 2024 268  164 - 446 K/uL Final    MPV 2024 9.1  9.0 - 12.9 fL Final    Neutrophils-Polys 2024 63.40  44.00 - 72.00 % Final    Lymphocytes 2024 22.00  22.00 - 41.00 % Final    Monocytes 2024 10.70  0.00 - 13.40 % Final    Eosinophils 2024 2.50  0.00 - 6.90 % Final    Basophils 2024 0.70  0.00 - 1.80 % Final    Immature Granulocytes  12/16/2024 0.70  0.00 - 0.90 % Final    Nucleated RBC 12/16/2024 0.00  0.00 - 0.20 /100 WBC Final    Neutrophils (Absolute) 12/16/2024 7.04  1.82 - 7.42 K/uL Final    Includes immature neutrophils, if present.    Lymphs (Absolute) 12/16/2024 2.45  1.00 - 4.80 K/uL Final    Monos (Absolute) 12/16/2024 1.19 (H)  0.00 - 0.85 K/uL Final    Eos (Absolute) 12/16/2024 0.28  0.00 - 0.51 K/uL Final    Baso (Absolute) 12/16/2024 0.08  0.00 - 0.12 K/uL Final    Immature Granulocytes (abs) 12/16/2024 0.08  0.00 - 0.11 K/uL Final    NRBC (Absolute) 12/16/2024 0.00  K/uL Final    Sodium 12/16/2024 140  135 - 145 mmol/L Final    Potassium 12/16/2024 4.5  3.6 - 5.5 mmol/L Final    Chloride 12/16/2024 107  96 - 112 mmol/L Final    Co2 12/16/2024 22  20 - 33 mmol/L Final    Anion Gap 12/16/2024 11.0  7.0 - 16.0 Final    Glucose 12/16/2024 132 (H)  65 - 99 mg/dL Final    Bun 12/16/2024 17  8 - 22 mg/dL Final    Creatinine 12/16/2024 1.01  0.50 - 1.40 mg/dL Final    Calcium 12/16/2024 10.1  8.5 - 10.5 mg/dL Final    Correct Calcium 12/16/2024 10.1  8.5 - 10.5 mg/dL Final    AST(SGOT) 12/16/2024 25  12 - 45 U/L Final    ALT(SGPT) 12/16/2024 17  2 - 50 U/L Final    Alkaline Phosphatase 12/16/2024 76  30 - 99 U/L Final    Total Bilirubin 12/16/2024 0.3  0.1 - 1.5 mg/dL Final    Albumin 12/16/2024 4.0  3.2 - 4.9 g/dL Final    Total Protein 12/16/2024 7.5  6.0 - 8.2 g/dL Final    Globulin 12/16/2024 3.5  1.9 - 3.5 g/dL Final    A-G Ratio 12/16/2024 1.1  g/dL Final    GFR (CKD-EPI) 12/16/2024 63  >60 mL/min/1.73 m 2 Final    Comment: Estimated Glomerular Filtration Rate is calculated using  race neutral CKD-EPI 2021 equation per NKF-ASN recommendations.          Assessment & Plan:     64 y.o. female with the following -     1. Hemoptysis (Primary)  - Subacute problem, unknown etiology unknown etiology.   - She was seen by ENT and evaluation was unremarkable, chest x-ray in ED unremarkable but has not had CT scan, no significant CBC  abnormalities, gets TB testing yearly  - - CT scan of the chest will be ordered. Concerns about potential lung cancer given smoking hx  - Referral to gastroenterology for further evaluation and possible endoscopy for esophagitis related bleeding. - Possibility of esophageal bleeding due to acid reflux or GERD as well as Irritation in the esophagus following an ablation procedure and SHELLIE, exacerbated by blood thinners.  - Referral to Gastroenterology  - CT-CHEST (THORAX) WITH; Future    2. Chronic anticoagulation  -Chronic, history of atrial fibrillation  - Continue Eliquis 5 mg twice daily for stroke prevention    3. Tobacco dependence  -We discussed increased risk of lung and other cancers due to her smoking history  -  recommend smoking cessation    4. Gastroesophageal reflux disease, unspecified whether esophagitis present  -New problem.   - We discussed foods that can trigger, avoiding eating 3-4 hours prior to bedtime, reduce alcohol intake   Start high dose PPI x4 weeks with omeprazole then reduce to 20mg until able to follow up with GI  - omeprazole (PRILOSEC) 40 MG delayed-release capsule; Take 1 Capsule by mouth every day.  Dispense: 30 Capsule; Refill: 0  - Referral to Gastroenterology    5. Colon cancer screening  - Referral to Gastroenterology    6. Encounter for screening mammogram for malignant neoplasm of breast  - MA-SCREENING MAMMO BILAT W/TOMOSYNTHESIS W/CAD; Future    7. Chronic cough  - CT-CHEST (THORAX) WITH; Future    8. Elevated MCV  - Recommend reduce or even better abstain from ETOH.   - lab testing pending includes CBC, added b12  - VITAMIN B12; Future    9. Prediabetes  - Monitor blood sugar levels and dietary habits.  - Discuss potential benefits of medication for prediabetes.  - HEMOGLOBIN A1C; Future      Assessment & Plan    **Health maintenance**  - Due for mammogram and colon cancer screening.  - Due for Pap smear.  - Referral to gastroenterology for colonoscopy and endoscopy.  -  Schedule mammogram and colon cancer screening through [employer].    **Sleep issues**  - Order sleep study.  - Discuss possibility of using an overnight pulse oximeter.  - Referral to sleep medicine specialist if needed.  - Monitor sleep patterns and symptoms.    Follow-up: The patient will follow up in 2 months for an annual exam with Pap smear.    I spent a total of 45 minutes with record review, exam, communication with the patient, communication with other providers, and documentation of this encounter.       Return in about 2 months (around 7/27/2025) for Annual preventative, with PAP smear .    Please note that this dictation was created using voice recognition software. I have made every reasonable attempt to correct obvious errors, but I expect that there are errors of grammar and possibly content that I did not discover before finalizing the note.

## 2025-05-27 NOTE — TELEPHONE ENCOUNTER
Received request via: Pharmacy    Was the patient seen in the last year in this department? Yes    Does the patient have an active prescription (recently filled or refills available) for medication(s) requested? No    Pharmacy Name: [facility]     Does the patient have USP Plus and need 100-day supply? (This applies to ALL medications) Patient does not have SCP

## 2025-05-29 ENCOUNTER — TELEPHONE (OUTPATIENT)
Dept: MEDICAL GROUP | Facility: MEDICAL CENTER | Age: 65
End: 2025-05-29
Payer: COMMERCIAL

## 2025-05-29 DIAGNOSIS — I50.32 CHRONIC HEART FAILURE WITH PRESERVED EJECTION FRACTION (HFPEF) (HCC): Primary | ICD-10-CM

## 2025-05-29 NOTE — TELEPHONE ENCOUNTER
----- Message from Ana Luisa CHASE sent at 5/29/2025  1:43 PM PDT -----  Regarding: GFR /BUN BMP or CMP lab  Garrick Bergman,The above pt was schedule for a CT w/contrast due to her age we do need to have lab worked completed before her appt. Any of the above lab orders is acceptable. Please let me know if you have any questions.Thank you,

## 2025-06-01 DIAGNOSIS — F33.0 MILD EPISODE OF RECURRENT MAJOR DEPRESSIVE DISORDER (HCC): Chronic | ICD-10-CM

## 2025-06-01 DIAGNOSIS — Z72.0 TOBACCO ABUSE: ICD-10-CM

## 2025-06-03 RX ORDER — BUPROPION HYDROCHLORIDE 300 MG/1
300 TABLET ORAL EVERY MORNING
Qty: 90 TABLET | Refills: 3 | Status: SHIPPED | OUTPATIENT
Start: 2025-06-03

## 2025-06-09 ENCOUNTER — HOSPITAL ENCOUNTER (OUTPATIENT)
Dept: LAB | Facility: MEDICAL CENTER | Age: 65
End: 2025-06-09
Attending: BEHAVIOR ANALYST
Payer: COMMERCIAL

## 2025-06-09 DIAGNOSIS — R71.8 ELEVATED MCV: ICD-10-CM

## 2025-06-09 DIAGNOSIS — R73.03 PREDIABETES: ICD-10-CM

## 2025-06-09 DIAGNOSIS — D75.1 POLYCYTHEMIA: ICD-10-CM

## 2025-06-09 DIAGNOSIS — I50.32 CHRONIC HEART FAILURE WITH PRESERVED EJECTION FRACTION (HFPEF) (HCC): ICD-10-CM

## 2025-06-09 LAB
BUN SERPL-MCNC: 17 MG/DL (ref 8–22)
CREAT SERPL-MCNC: 1.14 MG/DL (ref 0.5–1.4)
EST. AVERAGE GLUCOSE BLD GHB EST-MCNC: 123 MG/DL
GFR SERPLBLD CREATININE-BSD FMLA CKD-EPI: 54 ML/MIN/1.73 M 2
HBA1C MFR BLD: 5.9 % (ref 4–5.6)
VIT B12 SERPL-MCNC: 2955 PG/ML (ref 211–911)

## 2025-06-09 PROCEDURE — 83540 ASSAY OF IRON: CPT

## 2025-06-09 PROCEDURE — 82565 ASSAY OF CREATININE: CPT

## 2025-06-09 PROCEDURE — 82728 ASSAY OF FERRITIN: CPT

## 2025-06-09 PROCEDURE — 82607 VITAMIN B-12: CPT

## 2025-06-09 PROCEDURE — 82668 ASSAY OF ERYTHROPOIETIN: CPT

## 2025-06-09 PROCEDURE — 36415 COLL VENOUS BLD VENIPUNCTURE: CPT

## 2025-06-09 PROCEDURE — 83550 IRON BINDING TEST: CPT

## 2025-06-09 PROCEDURE — 84520 ASSAY OF UREA NITROGEN: CPT

## 2025-06-09 PROCEDURE — 83036 HEMOGLOBIN GLYCOSYLATED A1C: CPT

## 2025-06-10 LAB
FERRITIN SERPL-MCNC: 377 NG/ML (ref 10–291)
IRON SATN MFR SERPL: 36 % (ref 15–55)
IRON SERPL-MCNC: 92 UG/DL (ref 40–170)
TIBC SERPL-MCNC: 258 UG/DL (ref 250–450)
UIBC SERPL-MCNC: 166 UG/DL (ref 110–370)

## 2025-06-11 LAB — EPO SERPL-ACNC: 10 MU/ML (ref 4–27)

## 2025-06-12 ENCOUNTER — RESULTS FOLLOW-UP (OUTPATIENT)
Dept: MEDICAL GROUP | Facility: MEDICAL CENTER | Age: 65
End: 2025-06-12
Payer: COMMERCIAL

## 2025-06-12 DIAGNOSIS — D35.00 ADRENAL ADENOMA, UNSPECIFIED LATERALITY: Primary | ICD-10-CM

## 2025-06-13 ENCOUNTER — HOSPITAL ENCOUNTER (OUTPATIENT)
Dept: RADIOLOGY | Facility: MEDICAL CENTER | Age: 65
End: 2025-06-13
Attending: BEHAVIOR ANALYST
Payer: COMMERCIAL

## 2025-06-13 DIAGNOSIS — R05.3 CHRONIC COUGH: ICD-10-CM

## 2025-06-13 DIAGNOSIS — R04.2 HEMOPTYSIS: ICD-10-CM

## 2025-06-13 DIAGNOSIS — Z12.31 ENCOUNTER FOR SCREENING MAMMOGRAM FOR MALIGNANT NEOPLASM OF BREAST: ICD-10-CM

## 2025-06-13 PROCEDURE — 71260 CT THORAX DX C+: CPT

## 2025-06-13 PROCEDURE — 77063 BREAST TOMOSYNTHESIS BI: CPT

## 2025-06-13 PROCEDURE — 700117 HCHG RX CONTRAST REV CODE 255: Performed by: BEHAVIOR ANALYST

## 2025-06-13 RX ADMIN — IOHEXOL 80 ML: 350 INJECTION, SOLUTION INTRAVENOUS at 14:39

## 2025-06-14 LAB — EKG IMPRESSION: NORMAL

## 2025-06-17 PROBLEM — D35.00 ADRENAL ADENOMA: Status: ACTIVE | Noted: 2025-06-17

## 2025-06-24 ENCOUNTER — OFFICE VISIT (OUTPATIENT)
Dept: CARDIOLOGY | Facility: MEDICAL CENTER | Age: 65
End: 2025-06-24
Attending: INTERNAL MEDICINE
Payer: COMMERCIAL

## 2025-06-24 VITALS
SYSTOLIC BLOOD PRESSURE: 116 MMHG | OXYGEN SATURATION: 94 % | BODY MASS INDEX: 38.73 KG/M2 | WEIGHT: 241 LBS | RESPIRATION RATE: 16 BRPM | DIASTOLIC BLOOD PRESSURE: 78 MMHG | HEIGHT: 66 IN | HEART RATE: 79 BPM

## 2025-06-24 DIAGNOSIS — I48.91 ATRIAL FIBRILLATION, UNSPECIFIED TYPE (HCC): Primary | ICD-10-CM

## 2025-06-24 DIAGNOSIS — I50.32 CHRONIC HEART FAILURE WITH PRESERVED EJECTION FRACTION (HFPEF) (HCC): ICD-10-CM

## 2025-06-24 LAB — EKG IMPRESSION: NORMAL

## 2025-06-24 PROCEDURE — 3078F DIAST BP <80 MM HG: CPT | Performed by: INTERNAL MEDICINE

## 2025-06-24 PROCEDURE — 93005 ELECTROCARDIOGRAM TRACING: CPT | Mod: TC | Performed by: INTERNAL MEDICINE

## 2025-06-24 PROCEDURE — 99213 OFFICE O/P EST LOW 20 MIN: CPT | Performed by: INTERNAL MEDICINE

## 2025-06-24 PROCEDURE — 99214 OFFICE O/P EST MOD 30 MIN: CPT | Performed by: INTERNAL MEDICINE

## 2025-06-24 PROCEDURE — 3074F SYST BP LT 130 MM HG: CPT | Performed by: INTERNAL MEDICINE

## 2025-06-24 ASSESSMENT — FIBROSIS 4 INDEX: FIB4 SCORE: 1.98
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
## 2025-06-30 DIAGNOSIS — I50.32 CHRONIC HEART FAILURE WITH PRESERVED EJECTION FRACTION (HFPEF) (HCC): ICD-10-CM

## 2025-06-30 RX ORDER — EMPAGLIFLOZIN 10 MG/1
1 TABLET, FILM COATED ORAL
Qty: 90 TABLET | Refills: 3 | OUTPATIENT
Start: 2025-06-30

## 2025-07-08 DIAGNOSIS — I50.32 CHRONIC HEART FAILURE WITH PRESERVED EJECTION FRACTION (HFPEF) (HCC): ICD-10-CM

## 2025-07-08 DIAGNOSIS — Z79.899 ON POTASSIUM WASTING DIURETIC THERAPY: ICD-10-CM

## 2025-07-08 RX ORDER — FUROSEMIDE 20 MG/1
20 TABLET ORAL DAILY
Qty: 90 TABLET | Refills: 3 | OUTPATIENT
Start: 2025-07-08

## 2025-07-08 RX ORDER — POTASSIUM CHLORIDE 1500 MG/1
20 TABLET, EXTENDED RELEASE ORAL 2 TIMES DAILY
Qty: 180 TABLET | Refills: 3 | OUTPATIENT
Start: 2025-07-08

## 2025-07-10 ENCOUNTER — TELEPHONE (OUTPATIENT)
Dept: CARDIOLOGY | Facility: MEDICAL CENTER | Age: 65
End: 2025-07-10
Payer: COMMERCIAL
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
## 2025-07-10 NOTE — LETTER
PROCEDURE/SURGERY CLEARANCE FORM    Date: 7/10/2025   Patient Name: Gladis Ball    Dear Surgeon or Proceduralist,      Thank you for your request for cardiac stratification of our mutual patient Gladis Ball 1960. We have reviewed their [facility] records; and to the best of our understanding this patient has not had stenting, ablation, watchman, cardiothoracic surgery or hospitalization for cardiovascular reasons in the past 6 months.  Gladis Ball has been seen within the past 15 months and is considered to have non-modifiable cardiac risk for this low-risk procedure/surgery. They may proceed from a cardiovascular standpoint and may hold their antiplatelet/anticoagulation as briefly as possible. Please have patient resume this medication when hemodynamically stable to do so.     Aspirin or Prasugrel   - hold 7 days prior to procedure/surgery, resume when hemodynamically stable      Clopidrogrel or Ticagrelor  - hold 7 days for all neurological procedures, hold 5 days prior to all other procedure/surgery,  resume when hemodynamically stable     Warfarin - hold 7 days for all neurological procedures, hold 5 days prior to all other procedure/surgery and coordinate with [facility]ticoagulation [facility] ([phone]) INR testing and dose management.      Pradaxa/Xarelto/Eliquis/Savesya - hold 1 day prior to procedure for low bleeding risk procedure, 2 days for high bleeding risk procedure, or consider holding 3 days or longer for patients with reduced kidney function (CrCl <30mL/min) or spinal/cranial surgeries/procedures.      If they have a mechanical heart valve, please coordinate with [facility] Anticoagulation Service ([phone]) the proper management of their anticoagulant in the periprocedural or perioperative period.      Some patients have higher risk for cardiovascular complications or holding medication. If our patient has had prior complications of holding antiplatelet or anticoagulants  in the past and we have seen them after these events, we have addressed these concerns with the patient. They are at an unknown degree of increased risk for recurrent complication.  You may hold anticoagulation/antiplatelets for the procedure or surgery if the benefits of the procedure or surgery outweigh this nonmodifiable risk.      If Gladis Ball 1960 has new symptoms of heart failure decompensation, unstable arrythmia, or angina please reach out and we will assess the patient.      If you have other patient-specific concerns, please feel free to reach out to the patient's cardiologist directly at [phone].     Thank you,   [facility]    __ _Electronically signed________  Provider: Xin Huynh M.D.

## 2025-07-18 ENCOUNTER — OFFICE VISIT (OUTPATIENT)
Facility: MEDICAL CENTER | Age: 65
End: 2025-07-18
Attending: INTERNAL MEDICINE
Payer: COMMERCIAL

## 2025-07-18 ENCOUNTER — NON-PROVIDER VISIT (OUTPATIENT)
Facility: MEDICAL CENTER | Age: 65
End: 2025-07-18
Attending: INTERNAL MEDICINE
Payer: COMMERCIAL

## 2025-07-18 VITALS
BODY MASS INDEX: 37.61 KG/M2 | HEIGHT: 66 IN | OXYGEN SATURATION: 94 % | WEIGHT: 234 LBS | RESPIRATION RATE: 15 BRPM | DIASTOLIC BLOOD PRESSURE: 80 MMHG | HEART RATE: 87 BPM | SYSTOLIC BLOOD PRESSURE: 110 MMHG

## 2025-07-18 DIAGNOSIS — I50.32 CHRONIC HEART FAILURE WITH PRESERVED EJECTION FRACTION (HFPEF) (HCC): Primary | ICD-10-CM

## 2025-07-18 DIAGNOSIS — Z79.899 ON POTASSIUM WASTING DIURETIC THERAPY: ICD-10-CM

## 2025-07-18 DIAGNOSIS — Z98.890 S/P ABLATION OF ATRIAL FIBRILLATION: ICD-10-CM

## 2025-07-18 DIAGNOSIS — I77.810 ASCENDING AORTA DILATION (HCC): ICD-10-CM

## 2025-07-18 DIAGNOSIS — Z86.79 S/P ABLATION OF ATRIAL FIBRILLATION: ICD-10-CM

## 2025-07-18 DIAGNOSIS — E66.01 CLASS 2 SEVERE OBESITY DUE TO EXCESS CALORIES WITH SERIOUS COMORBIDITY AND BODY MASS INDEX (BMI) OF 37.0 TO 37.9 IN ADULT (HCC): ICD-10-CM

## 2025-07-18 DIAGNOSIS — Z79.01 CHRONIC ANTICOAGULATION: ICD-10-CM

## 2025-07-18 DIAGNOSIS — R06.83 SNORING: ICD-10-CM

## 2025-07-18 DIAGNOSIS — I34.0 NONRHEUMATIC MITRAL VALVE REGURGITATION: ICD-10-CM

## 2025-07-18 DIAGNOSIS — R73.03 PREDIABETES: ICD-10-CM

## 2025-07-18 DIAGNOSIS — E66.812 CLASS 2 SEVERE OBESITY DUE TO EXCESS CALORIES WITH SERIOUS COMORBIDITY AND BODY MASS INDEX (BMI) OF 37.0 TO 37.9 IN ADULT (HCC): ICD-10-CM

## 2025-07-18 DIAGNOSIS — I10 ESSENTIAL HYPERTENSION: Chronic | ICD-10-CM

## 2025-07-18 DIAGNOSIS — I48.91 ATRIAL FIBRILLATION, UNSPECIFIED TYPE (HCC): ICD-10-CM

## 2025-07-18 PROCEDURE — 3074F SYST BP LT 130 MM HG: CPT | Performed by: INTERNAL MEDICINE

## 2025-07-18 PROCEDURE — 99214 OFFICE O/P EST MOD 30 MIN: CPT | Performed by: INTERNAL MEDICINE

## 2025-07-18 PROCEDURE — 93246 EXT ECG>7D<15D RECORDING: CPT

## 2025-07-18 PROCEDURE — 3079F DIAST BP 80-89 MM HG: CPT | Performed by: INTERNAL MEDICINE

## 2025-07-18 ASSESSMENT — FIBROSIS 4 INDEX: FIB4 SCORE: 2.01

## 2025-07-18 NOTE — Clinical Note
REFERRAL APPROVAL NOTICE         Sent on July 18, 2025                   Gladis Ball  [address]  [place] [address]                   Dear Ms. Ball,    After a careful review of the medical information and benefit coverage, [facility] has processed your referral. See below for additional details.    If applicable, you must be actively enrolled with your insurance for coverage of the authorized service. If you have any questions regarding your coverage, please contact your insurance directly.    REFERRAL INFORMATION   Referral #:  [id number]  Referred-To Department    Referred-By Provider:  Pulmonary and Sleep Medicine    Michi Henao M.D.   Pulmonary/sleep [facility]      [address]  [address]  [place] [address]  [phone] [address], [address]  [place] [address] [phone]    Referral Start Date:  07/18/2025  Referral End Date:   07/18/2026           SCHEDULING  If you do not already have an appointment, please call [phone] to make an appointment.   MORE INFORMATION  As a reminder, [facility] ownership has changed, meaning this location is now owned and operated by [facility]. As such, we want to clarify that our patients should expect to receive two separate bills for the services received at [facility] - one representing the [facility] facility fees as the owner of the establishment, and the other to represent the physician's services and subsequent fees. You can speak with your insurance carrier for a pricing estimate by calling the customer service number on the back of your card and ask about charges for a hospital outpatient visit.  If you do not already have a [employer] account, sign up at: [email or web address]  You can access your medical information, make appointments, see lab results, billing information, and  more.  If you have questions regarding this referral, please contact  the [facility] department at:             [phone]. Monday - Friday 7:30AM - 5:00PM.      Sincerely,  [facility]

## 2025-07-18 NOTE — PATIENT INSTRUCTIONS
The electrophysiology team are made aware of your interest in Watchman  Stop isosorbide 30mg pill  Keep a blood pressure log twice a week, and let me know if persistent numbers higher than 130/80mmHg  Routine heart ultrasound April 2026  Referral to sleep medicine again

## 2025-07-18 NOTE — PROGRESS NOTES
Patient enrolled in the 14 day Zio Holter per AL   In clinic hook up, monitor S/N [id number]  Pending EOS
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
## 2025-07-21 ENCOUNTER — APPOINTMENT (OUTPATIENT)
Dept: CARDIOLOGY | Facility: MEDICAL CENTER | Age: 65
End: 2025-07-21
Attending: INTERNAL MEDICINE
Payer: COMMERCIAL

## 2025-07-31 ENCOUNTER — OFFICE VISIT (OUTPATIENT)
Dept: MEDICAL GROUP | Facility: MEDICAL CENTER | Age: 65
End: 2025-07-31
Payer: COMMERCIAL

## 2025-07-31 ENCOUNTER — HOSPITAL ENCOUNTER (OUTPATIENT)
Facility: MEDICAL CENTER | Age: 65
End: 2025-07-31
Attending: BEHAVIOR ANALYST
Payer: COMMERCIAL

## 2025-07-31 DIAGNOSIS — Z11.51 SCREENING FOR HPV (HUMAN PAPILLOMAVIRUS): ICD-10-CM

## 2025-07-31 DIAGNOSIS — Z01.419 WELL WOMAN EXAM: ICD-10-CM

## 2025-07-31 DIAGNOSIS — N89.8 VAGINAL DISCHARGE: ICD-10-CM

## 2025-07-31 DIAGNOSIS — Z12.4 SCREENING FOR CERVICAL CANCER: ICD-10-CM

## 2025-07-31 PROBLEM — K21.00 GASTROESOPHAGEAL REFLUX DISEASE WITH ESOPHAGITIS: Chronic | Status: ACTIVE | Noted: 2025-05-27

## 2025-07-31 PROCEDURE — 87624 HPV HI-RISK TYP POOLED RSLT: CPT

## 2025-07-31 PROCEDURE — 87510 GARDNER VAG DNA DIR PROBE: CPT

## 2025-07-31 PROCEDURE — 88175 CYTOPATH C/V AUTO FLUID REDO: CPT

## 2025-07-31 PROCEDURE — 87660 TRICHOMONAS VAGIN DIR PROBE: CPT

## 2025-07-31 PROCEDURE — 87480 CANDIDA DNA DIR PROBE: CPT

## 2025-07-31 ASSESSMENT — FIBROSIS 4 INDEX: FIB4 SCORE: 2.01

## 2025-07-31 NOTE — PROGRESS NOTES
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 1000 MG Cap Take 1,000 mg by mouth every day.     MEDICATION INSTRUCTIONS FOR SURGERY/PROCEDURE SCHEDULED FOR 10/30 and 12/19   DO NOT TAKE 7 DAYS PRIOR TO SURGERY      omeprazole (PRILOSEC) 40 MG delayed-release capsule Take 1 Capsule by mouth every day. (Patient not taking: Reported on 7/31/2025) 30 Capsule 0     No current facility-administered medications for this visit.   [2]   Allergies  Allergen Reactions    Codeine Itching     All over body , no rash      day.     MEDICATION INSTRUCTIONS FOR SURGERY/PROCEDURE SCHEDULED FOR 10/30 and 12/19   DO NOT TAKE 7 DAYS PRIOR TO SURGERY       No current facility-administered medications for this visit.   [2]   Allergies  Allergen Reactions    Codeine Itching     All over body , no rash

## 2025-08-01 LAB
CANDIDA DNA VAG QL PROBE+SIG AMP: NEGATIVE
G VAGINALIS DNA VAG QL PROBE+SIG AMP: NEGATIVE
T VAGINALIS DNA VAG QL PROBE+SIG AMP: NEGATIVE

## 2025-08-02 LAB
HPV I/H RISK 1 DNA SPEC QL NAA+PROBE: NOT DETECTED
SPECIMEN SOURCE: NORMAL

## 2025-08-05 LAB — THINPREP PAP, CYTOLOGY NL11781: NORMAL

## 2025-08-06 PROBLEM — Z87.891 PERSONAL HISTORY OF TOBACCO USE: Status: ACTIVE | Noted: 2019-09-19

## 2025-08-08 ENCOUNTER — TELEPHONE (OUTPATIENT)
Dept: CARDIOLOGY | Facility: MEDICAL CENTER | Age: 65
End: 2025-08-08
Payer: COMMERCIAL

## 2025-08-08 ENCOUNTER — RESULTS FOLLOW-UP (OUTPATIENT)
Dept: CARDIOLOGY | Facility: MEDICAL CENTER | Age: 65
End: 2025-08-08
Payer: COMMERCIAL

## 2025-08-11 ENCOUNTER — RESULTS FOLLOW-UP (OUTPATIENT)
Dept: MEDICAL GROUP | Facility: MEDICAL CENTER | Age: 65
End: 2025-08-11
Payer: COMMERCIAL

## 2025-08-12 ENCOUNTER — OFFICE VISIT (OUTPATIENT)
Dept: SLEEP MEDICINE | Facility: MEDICAL CENTER | Age: 65
End: 2025-08-12
Payer: COMMERCIAL

## 2025-08-12 VITALS
OXYGEN SATURATION: 93 % | RESPIRATION RATE: 16 BRPM | HEART RATE: 78 BPM | WEIGHT: 232.1 LBS | HEIGHT: 66 IN | DIASTOLIC BLOOD PRESSURE: 72 MMHG | BODY MASS INDEX: 37.3 KG/M2 | SYSTOLIC BLOOD PRESSURE: 122 MMHG

## 2025-08-12 DIAGNOSIS — G47.30 SLEEP DISORDER BREATHING: Primary | ICD-10-CM

## 2025-08-12 DIAGNOSIS — I48.91 ATRIAL FIBRILLATION, UNSPECIFIED TYPE (HCC): ICD-10-CM

## 2025-08-12 DIAGNOSIS — I50.32 CHRONIC HEART FAILURE WITH PRESERVED EJECTION FRACTION (HFPEF) (HCC): ICD-10-CM

## 2025-08-12 PROCEDURE — 3078F DIAST BP <80 MM HG: CPT

## 2025-08-12 PROCEDURE — 3074F SYST BP LT 130 MM HG: CPT

## 2025-08-12 PROCEDURE — 99213 OFFICE O/P EST LOW 20 MIN: CPT

## 2025-08-12 PROCEDURE — 99204 OFFICE O/P NEW MOD 45 MIN: CPT

## 2025-08-12 ASSESSMENT — FIBROSIS 4 INDEX: FIB4 SCORE: 2.01

## 2025-08-22 RX ORDER — OMEPRAZOLE 20 MG/1
20 CAPSULE, DELAYED RELEASE ORAL DAILY
Qty: 90 CAPSULE | Refills: 1 | Status: SHIPPED | OUTPATIENT
Start: 2025-08-22

## 2025-08-26 ENCOUNTER — PATIENT MESSAGE (OUTPATIENT)
Dept: SLEEP MEDICINE | Facility: MEDICAL CENTER | Age: 65
End: 2025-08-26
Payer: COMMERCIAL

## 2025-08-26 DIAGNOSIS — G47.30 SLEEP DISORDER BREATHING: Primary | ICD-10-CM

## 2025-10-01 ENCOUNTER — APPOINTMENT (OUTPATIENT)
Dept: SLEEP MEDICINE | Facility: MEDICAL CENTER | Age: 65
End: 2025-10-01
Payer: COMMERCIAL